# Patient Record
Sex: MALE | Race: WHITE | NOT HISPANIC OR LATINO | Employment: OTHER | URBAN - METROPOLITAN AREA
[De-identification: names, ages, dates, MRNs, and addresses within clinical notes are randomized per-mention and may not be internally consistent; named-entity substitution may affect disease eponyms.]

---

## 2017-01-10 ENCOUNTER — COMMUNICATION - HEALTHEAST (OUTPATIENT)
Dept: FAMILY MEDICINE | Facility: CLINIC | Age: 57
End: 2017-01-10

## 2017-01-30 ENCOUNTER — COMMUNICATION - HEALTHEAST (OUTPATIENT)
Dept: TELEHEALTH | Facility: CLINIC | Age: 57
End: 2017-01-30

## 2017-01-30 ENCOUNTER — OFFICE VISIT - HEALTHEAST (OUTPATIENT)
Dept: FAMILY MEDICINE | Facility: CLINIC | Age: 57
End: 2017-01-30

## 2017-01-30 DIAGNOSIS — I67.1 CEREBRAL ANEURYSM, NONRUPTURED: ICD-10-CM

## 2017-01-30 DIAGNOSIS — N52.9 ED (ERECTILE DYSFUNCTION): ICD-10-CM

## 2017-01-30 DIAGNOSIS — F32.9 MAJOR DEPRESSIVE DISORDER, SINGLE EPISODE, UNSPECIFIED: ICD-10-CM

## 2017-01-30 DIAGNOSIS — M25.532 WRIST PAIN, CHRONIC, LEFT: ICD-10-CM

## 2017-01-30 DIAGNOSIS — G89.29 WRIST PAIN, CHRONIC, LEFT: ICD-10-CM

## 2017-01-30 DIAGNOSIS — R52 PAIN: ICD-10-CM

## 2017-01-30 DIAGNOSIS — G81.90 HEMIPLEGIA (H): ICD-10-CM

## 2017-01-30 DIAGNOSIS — E78.5 HYPERLIPIDEMIA: ICD-10-CM

## 2017-01-30 LAB
CHOLEST SERPL-MCNC: 174 MG/DL
FASTING STATUS PATIENT QL REPORTED: YES
HDLC SERPL-MCNC: 41 MG/DL
LDLC SERPL CALC-MCNC: 105 MG/DL
TRIGL SERPL-MCNC: 139 MG/DL

## 2017-02-24 ENCOUNTER — COMMUNICATION - HEALTHEAST (OUTPATIENT)
Dept: FAMILY MEDICINE | Facility: CLINIC | Age: 57
End: 2017-02-24

## 2017-02-24 DIAGNOSIS — F32.9 MAJOR DEPRESSIVE DISORDER, SINGLE EPISODE, UNSPECIFIED: ICD-10-CM

## 2017-03-08 ENCOUNTER — COMMUNICATION - HEALTHEAST (OUTPATIENT)
Dept: FAMILY MEDICINE | Facility: CLINIC | Age: 57
End: 2017-03-08

## 2017-04-28 ENCOUNTER — COMMUNICATION - HEALTHEAST (OUTPATIENT)
Dept: FAMILY MEDICINE | Facility: CLINIC | Age: 57
End: 2017-04-28

## 2017-05-01 ENCOUNTER — COMMUNICATION - HEALTHEAST (OUTPATIENT)
Dept: SCHEDULING | Facility: CLINIC | Age: 57
End: 2017-05-01

## 2017-05-09 ENCOUNTER — COMMUNICATION - HEALTHEAST (OUTPATIENT)
Dept: FAMILY MEDICINE | Facility: CLINIC | Age: 57
End: 2017-05-09

## 2017-05-09 DIAGNOSIS — F32.9 MAJOR DEPRESSIVE DISORDER, SINGLE EPISODE, UNSPECIFIED: ICD-10-CM

## 2017-05-22 ENCOUNTER — RECORDS - HEALTHEAST (OUTPATIENT)
Dept: ADMINISTRATIVE | Facility: OTHER | Age: 57
End: 2017-05-22

## 2017-05-22 ENCOUNTER — OFFICE VISIT - HEALTHEAST (OUTPATIENT)
Dept: FAMILY MEDICINE | Facility: CLINIC | Age: 57
End: 2017-05-22

## 2017-05-22 DIAGNOSIS — F32.9 MAJOR DEPRESSIVE DISORDER, SINGLE EPISODE, UNSPECIFIED: ICD-10-CM

## 2017-05-22 DIAGNOSIS — M25.532 WRIST PAIN, CHRONIC, LEFT: ICD-10-CM

## 2017-05-22 DIAGNOSIS — G81.90 HEMIPLEGIA (H): ICD-10-CM

## 2017-05-22 DIAGNOSIS — R97.20 ELEVATED PSA: ICD-10-CM

## 2017-05-22 DIAGNOSIS — G89.29 WRIST PAIN, CHRONIC, LEFT: ICD-10-CM

## 2017-05-22 DIAGNOSIS — N40.0 BPH (BENIGN PROSTATIC HYPERPLASIA): ICD-10-CM

## 2017-05-22 LAB — PSA SERPL-MCNC: 4.4 NG/ML (ref 0–3.5)

## 2017-05-22 ASSESSMENT — MIFFLIN-ST. JEOR: SCORE: 1753.6

## 2017-07-05 ENCOUNTER — COMMUNICATION - HEALTHEAST (OUTPATIENT)
Dept: FAMILY MEDICINE | Facility: CLINIC | Age: 57
End: 2017-07-05

## 2017-07-13 ENCOUNTER — COMMUNICATION - HEALTHEAST (OUTPATIENT)
Dept: FAMILY MEDICINE | Facility: CLINIC | Age: 57
End: 2017-07-13

## 2017-07-13 DIAGNOSIS — G89.29 WRIST PAIN, CHRONIC, LEFT: ICD-10-CM

## 2017-07-13 DIAGNOSIS — N40.0 BPH (BENIGN PROSTATIC HYPERPLASIA): ICD-10-CM

## 2017-07-13 DIAGNOSIS — M25.532 WRIST PAIN, CHRONIC, LEFT: ICD-10-CM

## 2017-08-08 ENCOUNTER — COMMUNICATION - HEALTHEAST (OUTPATIENT)
Dept: FAMILY MEDICINE | Facility: CLINIC | Age: 57
End: 2017-08-08

## 2017-08-08 DIAGNOSIS — G89.29 WRIST PAIN, CHRONIC, LEFT: ICD-10-CM

## 2017-08-08 DIAGNOSIS — M79.642 HAND PAIN, LEFT: ICD-10-CM

## 2017-08-08 DIAGNOSIS — M25.532 WRIST PAIN, CHRONIC, LEFT: ICD-10-CM

## 2017-08-08 DIAGNOSIS — F32.9 MAJOR DEPRESSIVE DISORDER, SINGLE EPISODE, UNSPECIFIED: ICD-10-CM

## 2017-08-21 ENCOUNTER — RECORDS - HEALTHEAST (OUTPATIENT)
Dept: ADMINISTRATIVE | Facility: OTHER | Age: 57
End: 2017-08-21

## 2017-08-28 ENCOUNTER — COMMUNICATION - HEALTHEAST (OUTPATIENT)
Dept: FAMILY MEDICINE | Facility: CLINIC | Age: 57
End: 2017-08-28

## 2017-08-28 DIAGNOSIS — M25.579 ANKLE PAIN: ICD-10-CM

## 2017-09-01 ENCOUNTER — COMMUNICATION - HEALTHEAST (OUTPATIENT)
Dept: FAMILY MEDICINE | Facility: CLINIC | Age: 57
End: 2017-09-01

## 2017-09-05 ENCOUNTER — RECORDS - HEALTHEAST (OUTPATIENT)
Dept: ADMINISTRATIVE | Facility: OTHER | Age: 57
End: 2017-09-05

## 2017-09-28 ENCOUNTER — RECORDS - HEALTHEAST (OUTPATIENT)
Dept: ADMINISTRATIVE | Facility: OTHER | Age: 57
End: 2017-09-28

## 2017-10-24 ENCOUNTER — COMMUNICATION - HEALTHEAST (OUTPATIENT)
Dept: FAMILY MEDICINE | Facility: CLINIC | Age: 57
End: 2017-10-24

## 2017-10-24 DIAGNOSIS — M25.532 WRIST PAIN, CHRONIC, LEFT: ICD-10-CM

## 2017-10-24 DIAGNOSIS — G89.29 WRIST PAIN, CHRONIC, LEFT: ICD-10-CM

## 2017-10-24 DIAGNOSIS — N40.0 BPH (BENIGN PROSTATIC HYPERPLASIA): ICD-10-CM

## 2017-10-24 DIAGNOSIS — F32.9 MAJOR DEPRESSIVE DISORDER, SINGLE EPISODE: ICD-10-CM

## 2017-12-05 ENCOUNTER — COMMUNICATION - HEALTHEAST (OUTPATIENT)
Dept: FAMILY MEDICINE | Facility: CLINIC | Age: 57
End: 2017-12-05

## 2018-01-27 ENCOUNTER — COMMUNICATION - HEALTHEAST (OUTPATIENT)
Dept: FAMILY MEDICINE | Facility: CLINIC | Age: 58
End: 2018-01-27

## 2018-01-30 ENCOUNTER — COMMUNICATION - HEALTHEAST (OUTPATIENT)
Dept: FAMILY MEDICINE | Facility: CLINIC | Age: 58
End: 2018-01-30

## 2018-01-30 DIAGNOSIS — F32.9 MAJOR DEPRESSIVE DISORDER, SINGLE EPISODE: ICD-10-CM

## 2018-02-06 ENCOUNTER — COMMUNICATION - HEALTHEAST (OUTPATIENT)
Dept: FAMILY MEDICINE | Facility: CLINIC | Age: 58
End: 2018-02-06

## 2018-02-15 ENCOUNTER — OFFICE VISIT - HEALTHEAST (OUTPATIENT)
Dept: FAMILY MEDICINE | Facility: CLINIC | Age: 58
End: 2018-02-15

## 2018-02-15 ENCOUNTER — COMMUNICATION - HEALTHEAST (OUTPATIENT)
Dept: TELEHEALTH | Facility: CLINIC | Age: 58
End: 2018-02-15

## 2018-02-15 ENCOUNTER — RECORDS - HEALTHEAST (OUTPATIENT)
Dept: ADMINISTRATIVE | Facility: OTHER | Age: 58
End: 2018-02-15

## 2018-02-15 DIAGNOSIS — M25.532 WRIST PAIN, CHRONIC, LEFT: ICD-10-CM

## 2018-02-15 DIAGNOSIS — I67.1 CEREBRAL ANEURYSM, NONRUPTURED: ICD-10-CM

## 2018-02-15 DIAGNOSIS — F32.9 MAJOR DEPRESSIVE DISORDER, SINGLE EPISODE: ICD-10-CM

## 2018-02-15 DIAGNOSIS — G81.90 HEMIPLEGIA (H): ICD-10-CM

## 2018-02-15 DIAGNOSIS — G89.29 WRIST PAIN, CHRONIC, LEFT: ICD-10-CM

## 2018-02-15 LAB
ALBUMIN SERPL-MCNC: 3.7 G/DL (ref 3.5–5)
ALP SERPL-CCNC: 71 U/L (ref 45–120)
ALT SERPL W P-5'-P-CCNC: 17 U/L (ref 0–45)
ANION GAP SERPL CALCULATED.3IONS-SCNC: 8 MMOL/L (ref 5–18)
AST SERPL W P-5'-P-CCNC: 19 U/L (ref 0–40)
BILIRUB SERPL-MCNC: 0.2 MG/DL (ref 0–1)
BUN SERPL-MCNC: 22 MG/DL (ref 8–22)
CALCIUM SERPL-MCNC: 9.3 MG/DL (ref 8.5–10.5)
CHLORIDE BLD-SCNC: 104 MMOL/L (ref 98–107)
CHOLEST SERPL-MCNC: 177 MG/DL
CO2 SERPL-SCNC: 27 MMOL/L (ref 22–31)
CREAT SERPL-MCNC: 0.8 MG/DL (ref 0.7–1.3)
FASTING STATUS PATIENT QL REPORTED: YES
GFR SERPL CREATININE-BSD FRML MDRD: >60 ML/MIN/1.73M2
GLUCOSE BLD-MCNC: 111 MG/DL (ref 70–125)
HDLC SERPL-MCNC: 39 MG/DL
LDLC SERPL CALC-MCNC: 87 MG/DL
POTASSIUM BLD-SCNC: 4.5 MMOL/L (ref 3.5–5)
PROT SERPL-MCNC: 7.2 G/DL (ref 6–8)
SODIUM SERPL-SCNC: 139 MMOL/L (ref 136–145)
TRIGL SERPL-MCNC: 256 MG/DL
VALPROATE SERPL-MCNC: 47.5 UG/ML (ref 50–150)

## 2018-02-15 ASSESSMENT — MIFFLIN-ST. JEOR: SCORE: 1771.74

## 2018-02-16 ENCOUNTER — COMMUNICATION - HEALTHEAST (OUTPATIENT)
Dept: FAMILY MEDICINE | Facility: CLINIC | Age: 58
End: 2018-02-16

## 2018-02-16 DIAGNOSIS — N52.9 ED (ERECTILE DYSFUNCTION): ICD-10-CM

## 2018-02-19 ENCOUNTER — COMMUNICATION - HEALTHEAST (OUTPATIENT)
Dept: FAMILY MEDICINE | Facility: CLINIC | Age: 58
End: 2018-02-19

## 2018-03-29 ENCOUNTER — COMMUNICATION - HEALTHEAST (OUTPATIENT)
Dept: FAMILY MEDICINE | Facility: CLINIC | Age: 58
End: 2018-03-29

## 2018-04-19 ENCOUNTER — COMMUNICATION - HEALTHEAST (OUTPATIENT)
Dept: FAMILY MEDICINE | Facility: CLINIC | Age: 58
End: 2018-04-19

## 2018-04-19 DIAGNOSIS — F32.9 MAJOR DEPRESSIVE DISORDER, SINGLE EPISODE: ICD-10-CM

## 2018-05-07 ENCOUNTER — COMMUNICATION - HEALTHEAST (OUTPATIENT)
Dept: FAMILY MEDICINE | Facility: CLINIC | Age: 58
End: 2018-05-07

## 2018-05-21 ENCOUNTER — COMMUNICATION - HEALTHEAST (OUTPATIENT)
Dept: FAMILY MEDICINE | Facility: CLINIC | Age: 58
End: 2018-05-21

## 2018-05-21 DIAGNOSIS — G89.29 WRIST PAIN, CHRONIC, LEFT: ICD-10-CM

## 2018-05-21 DIAGNOSIS — M25.532 WRIST PAIN, CHRONIC, LEFT: ICD-10-CM

## 2018-06-07 ENCOUNTER — COMMUNICATION - HEALTHEAST (OUTPATIENT)
Dept: FAMILY MEDICINE | Facility: CLINIC | Age: 58
End: 2018-06-07

## 2018-06-07 DIAGNOSIS — I67.1 CEREBRAL ANEURYSM, NONRUPTURED: ICD-10-CM

## 2018-06-07 DIAGNOSIS — G81.90 HEMIPLEGIA (H): ICD-10-CM

## 2018-07-11 ENCOUNTER — COMMUNICATION - HEALTHEAST (OUTPATIENT)
Dept: FAMILY MEDICINE | Facility: CLINIC | Age: 58
End: 2018-07-11

## 2018-07-11 DIAGNOSIS — N52.9 ED (ERECTILE DYSFUNCTION): ICD-10-CM

## 2018-07-27 ENCOUNTER — COMMUNICATION - HEALTHEAST (OUTPATIENT)
Dept: FAMILY MEDICINE | Facility: CLINIC | Age: 58
End: 2018-07-27

## 2018-07-27 DIAGNOSIS — M25.532 WRIST PAIN, CHRONIC, LEFT: ICD-10-CM

## 2018-07-27 DIAGNOSIS — G89.29 WRIST PAIN, CHRONIC, LEFT: ICD-10-CM

## 2018-07-30 ENCOUNTER — RECORDS - HEALTHEAST (OUTPATIENT)
Dept: ADMINISTRATIVE | Facility: OTHER | Age: 58
End: 2018-07-30

## 2018-08-07 ENCOUNTER — COMMUNICATION - HEALTHEAST (OUTPATIENT)
Dept: SCHEDULING | Facility: CLINIC | Age: 58
End: 2018-08-07

## 2018-08-08 ENCOUNTER — COMMUNICATION - HEALTHEAST (OUTPATIENT)
Dept: FAMILY MEDICINE | Facility: CLINIC | Age: 58
End: 2018-08-08

## 2018-08-09 ENCOUNTER — RECORDS - HEALTHEAST (OUTPATIENT)
Dept: GENERAL RADIOLOGY | Facility: CLINIC | Age: 58
End: 2018-08-09

## 2018-08-09 ENCOUNTER — OFFICE VISIT - HEALTHEAST (OUTPATIENT)
Dept: FAMILY MEDICINE | Facility: CLINIC | Age: 58
End: 2018-08-09

## 2018-08-09 DIAGNOSIS — R10.12 LEFT UPPER QUADRANT PAIN: ICD-10-CM

## 2018-08-09 DIAGNOSIS — R10.12 LUQ ABDOMINAL PAIN: ICD-10-CM

## 2018-08-09 LAB
ALBUMIN SERPL-MCNC: 3.6 G/DL (ref 3.5–5)
ALP SERPL-CCNC: 59 U/L (ref 45–120)
ALT SERPL W P-5'-P-CCNC: 10 U/L (ref 0–45)
ANION GAP SERPL CALCULATED.3IONS-SCNC: 8 MMOL/L (ref 5–18)
AST SERPL W P-5'-P-CCNC: 16 U/L (ref 0–40)
BASOPHILS # BLD AUTO: 0.1 THOU/UL (ref 0–0.2)
BASOPHILS NFR BLD AUTO: 1 % (ref 0–2)
BILIRUB SERPL-MCNC: 0.4 MG/DL (ref 0–1)
BUN SERPL-MCNC: 20 MG/DL (ref 8–22)
CALCIUM SERPL-MCNC: 9.3 MG/DL (ref 8.5–10.5)
CHLORIDE BLD-SCNC: 106 MMOL/L (ref 98–107)
CO2 SERPL-SCNC: 27 MMOL/L (ref 22–31)
CREAT SERPL-MCNC: 0.75 MG/DL (ref 0.7–1.3)
EOSINOPHIL # BLD AUTO: 0.3 THOU/UL (ref 0–0.4)
EOSINOPHIL NFR BLD AUTO: 4 % (ref 0–6)
ERYTHROCYTE [DISTWIDTH] IN BLOOD BY AUTOMATED COUNT: 13 % (ref 11–14.5)
GFR SERPL CREATININE-BSD FRML MDRD: >60 ML/MIN/1.73M2
GLUCOSE BLD-MCNC: 78 MG/DL (ref 70–125)
HCT VFR BLD AUTO: 42.4 % (ref 40–54)
HGB BLD-MCNC: 14.5 G/DL (ref 14–18)
LIPASE SERPL-CCNC: 34 U/L (ref 0–52)
LYMPHOCYTES # BLD AUTO: 2.5 THOU/UL (ref 0.8–4.4)
LYMPHOCYTES NFR BLD AUTO: 31 % (ref 20–40)
MCH RBC QN AUTO: 31.2 PG (ref 27–34)
MCHC RBC AUTO-ENTMCNC: 34.2 G/DL (ref 32–36)
MCV RBC AUTO: 91 FL (ref 80–100)
MONOCYTES # BLD AUTO: 1.1 THOU/UL (ref 0–0.9)
MONOCYTES NFR BLD AUTO: 14 % (ref 2–10)
NEUTROPHILS # BLD AUTO: 4.1 THOU/UL (ref 2–7.7)
NEUTROPHILS NFR BLD AUTO: 52 % (ref 50–70)
PLATELET # BLD AUTO: 218 THOU/UL (ref 140–440)
PMV BLD AUTO: 8.2 FL (ref 7–10)
POTASSIUM BLD-SCNC: 4.4 MMOL/L (ref 3.5–5)
PROT SERPL-MCNC: 6.4 G/DL (ref 6–8)
RBC # BLD AUTO: 4.64 MILL/UL (ref 4.4–6.2)
SODIUM SERPL-SCNC: 141 MMOL/L (ref 136–145)
WBC: 8 THOU/UL (ref 4–11)

## 2018-08-09 ASSESSMENT — MIFFLIN-ST. JEOR: SCORE: 1697.81

## 2018-08-13 ENCOUNTER — COMMUNICATION - HEALTHEAST (OUTPATIENT)
Dept: FAMILY MEDICINE | Facility: CLINIC | Age: 58
End: 2018-08-13

## 2018-08-13 ENCOUNTER — RECORDS - HEALTHEAST (OUTPATIENT)
Dept: ADMINISTRATIVE | Facility: OTHER | Age: 58
End: 2018-08-13

## 2018-08-14 ENCOUNTER — COMMUNICATION - HEALTHEAST (OUTPATIENT)
Dept: FAMILY MEDICINE | Facility: CLINIC | Age: 58
End: 2018-08-14

## 2018-08-17 ENCOUNTER — COMMUNICATION - HEALTHEAST (OUTPATIENT)
Dept: FAMILY MEDICINE | Facility: CLINIC | Age: 58
End: 2018-08-17

## 2018-08-17 DIAGNOSIS — F32.9 MAJOR DEPRESSIVE DISORDER, SINGLE EPISODE: ICD-10-CM

## 2018-08-17 DIAGNOSIS — I67.1 CEREBRAL ANEURYSM, NONRUPTURED: ICD-10-CM

## 2018-08-17 DIAGNOSIS — G81.90 HEMIPLEGIA (H): ICD-10-CM

## 2018-08-21 ENCOUNTER — COMMUNICATION - HEALTHEAST (OUTPATIENT)
Dept: FAMILY MEDICINE | Facility: CLINIC | Age: 58
End: 2018-08-21

## 2018-08-21 DIAGNOSIS — F32.9 MAJOR DEPRESSIVE DISORDER, SINGLE EPISODE: ICD-10-CM

## 2018-09-24 ENCOUNTER — COMMUNICATION - HEALTHEAST (OUTPATIENT)
Dept: FAMILY MEDICINE | Facility: CLINIC | Age: 58
End: 2018-09-24

## 2018-09-24 DIAGNOSIS — F32.9 MAJOR DEPRESSIVE DISORDER, SINGLE EPISODE: ICD-10-CM

## 2018-09-27 ENCOUNTER — COMMUNICATION - HEALTHEAST (OUTPATIENT)
Dept: FAMILY MEDICINE | Facility: CLINIC | Age: 58
End: 2018-09-27

## 2018-09-27 DIAGNOSIS — M25.532 WRIST PAIN, CHRONIC, LEFT: ICD-10-CM

## 2018-09-27 DIAGNOSIS — G89.29 WRIST PAIN, CHRONIC, LEFT: ICD-10-CM

## 2018-10-01 ENCOUNTER — COMMUNICATION - HEALTHEAST (OUTPATIENT)
Dept: FAMILY MEDICINE | Facility: CLINIC | Age: 58
End: 2018-10-01

## 2018-10-01 ENCOUNTER — RECORDS - HEALTHEAST (OUTPATIENT)
Dept: ADMINISTRATIVE | Facility: OTHER | Age: 58
End: 2018-10-01

## 2018-10-09 ENCOUNTER — RECORDS - HEALTHEAST (OUTPATIENT)
Dept: ADMINISTRATIVE | Facility: OTHER | Age: 58
End: 2018-10-09

## 2018-10-16 ENCOUNTER — COMMUNICATION - HEALTHEAST (OUTPATIENT)
Dept: FAMILY MEDICINE | Facility: CLINIC | Age: 58
End: 2018-10-16

## 2018-10-17 ENCOUNTER — RECORDS - HEALTHEAST (OUTPATIENT)
Dept: ADMINISTRATIVE | Facility: OTHER | Age: 58
End: 2018-10-17

## 2018-10-23 ENCOUNTER — COMMUNICATION - HEALTHEAST (OUTPATIENT)
Dept: FAMILY MEDICINE | Facility: CLINIC | Age: 58
End: 2018-10-23

## 2018-10-23 DIAGNOSIS — F32.9 MAJOR DEPRESSIVE DISORDER, SINGLE EPISODE: ICD-10-CM

## 2018-11-14 ENCOUNTER — COMMUNICATION - HEALTHEAST (OUTPATIENT)
Dept: FAMILY MEDICINE | Facility: CLINIC | Age: 58
End: 2018-11-14

## 2018-11-14 DIAGNOSIS — M25.532 WRIST PAIN, CHRONIC, LEFT: ICD-10-CM

## 2018-11-14 DIAGNOSIS — G89.29 WRIST PAIN, CHRONIC, LEFT: ICD-10-CM

## 2018-11-22 ENCOUNTER — COMMUNICATION - HEALTHEAST (OUTPATIENT)
Dept: FAMILY MEDICINE | Facility: CLINIC | Age: 58
End: 2018-11-22

## 2018-11-22 DIAGNOSIS — F32.9 MAJOR DEPRESSIVE DISORDER, SINGLE EPISODE: ICD-10-CM

## 2018-12-19 ENCOUNTER — COMMUNICATION - HEALTHEAST (OUTPATIENT)
Dept: FAMILY MEDICINE | Facility: CLINIC | Age: 58
End: 2018-12-19

## 2018-12-19 ENCOUNTER — RECORDS - HEALTHEAST (OUTPATIENT)
Dept: ADMINISTRATIVE | Facility: OTHER | Age: 58
End: 2018-12-19

## 2018-12-19 DIAGNOSIS — N40.0 BPH (BENIGN PROSTATIC HYPERPLASIA): ICD-10-CM

## 2018-12-20 ENCOUNTER — COMMUNICATION - HEALTHEAST (OUTPATIENT)
Dept: FAMILY MEDICINE | Facility: CLINIC | Age: 58
End: 2018-12-20

## 2018-12-20 DIAGNOSIS — M25.532 WRIST PAIN, CHRONIC, LEFT: ICD-10-CM

## 2018-12-20 DIAGNOSIS — G89.29 WRIST PAIN, CHRONIC, LEFT: ICD-10-CM

## 2018-12-28 ENCOUNTER — COMMUNICATION - HEALTHEAST (OUTPATIENT)
Dept: FAMILY MEDICINE | Facility: CLINIC | Age: 58
End: 2018-12-28

## 2018-12-28 DIAGNOSIS — F32.9 MAJOR DEPRESSIVE DISORDER, SINGLE EPISODE: ICD-10-CM

## 2019-02-06 ENCOUNTER — COMMUNICATION - HEALTHEAST (OUTPATIENT)
Dept: FAMILY MEDICINE | Facility: CLINIC | Age: 59
End: 2019-02-06

## 2019-02-06 DIAGNOSIS — F32.9 MAJOR DEPRESSIVE DISORDER, SINGLE EPISODE: ICD-10-CM

## 2019-02-18 ENCOUNTER — OFFICE VISIT - HEALTHEAST (OUTPATIENT)
Dept: FAMILY MEDICINE | Facility: CLINIC | Age: 59
End: 2019-02-18

## 2019-02-18 DIAGNOSIS — R35.1 BENIGN PROSTATIC HYPERPLASIA WITH NOCTURIA: ICD-10-CM

## 2019-02-18 DIAGNOSIS — Z87.81 H/O COMPRESSION FRACTURE OF SPINE: ICD-10-CM

## 2019-02-18 DIAGNOSIS — M25.532 WRIST PAIN, CHRONIC, LEFT: ICD-10-CM

## 2019-02-18 DIAGNOSIS — N40.1 BENIGN PROSTATIC HYPERPLASIA WITH NOCTURIA: ICD-10-CM

## 2019-02-18 DIAGNOSIS — N40.0 BPH (BENIGN PROSTATIC HYPERPLASIA): ICD-10-CM

## 2019-02-18 DIAGNOSIS — G89.29 WRIST PAIN, CHRONIC, LEFT: ICD-10-CM

## 2019-02-18 DIAGNOSIS — G81.90 HEMIPLEGIA, UNSPECIFIED ETIOLOGY, UNSPECIFIED HEMIPLEGIA TYPE, UNSPECIFIED LATERALITY (H): ICD-10-CM

## 2019-02-18 DIAGNOSIS — F32.5 MAJOR DEPRESSIVE DISORDER WITH SINGLE EPISODE, IN FULL REMISSION (H): ICD-10-CM

## 2019-02-18 DIAGNOSIS — I67.1 CEREBRAL ANEURYSM, NONRUPTURED: ICD-10-CM

## 2019-02-18 LAB
ALBUMIN SERPL-MCNC: 3.4 G/DL (ref 3.5–5)
ALP SERPL-CCNC: 67 U/L (ref 45–120)
ALT SERPL W P-5'-P-CCNC: 12 U/L (ref 0–45)
ANION GAP SERPL CALCULATED.3IONS-SCNC: 9 MMOL/L (ref 5–18)
AST SERPL W P-5'-P-CCNC: 14 U/L (ref 0–40)
BILIRUB SERPL-MCNC: 0.2 MG/DL (ref 0–1)
BUN SERPL-MCNC: 26 MG/DL (ref 8–22)
CALCIUM SERPL-MCNC: 8.9 MG/DL (ref 8.5–10.5)
CHLORIDE BLD-SCNC: 107 MMOL/L (ref 98–107)
CHOLEST SERPL-MCNC: 151 MG/DL
CO2 SERPL-SCNC: 24 MMOL/L (ref 22–31)
CREAT SERPL-MCNC: 0.75 MG/DL (ref 0.7–1.3)
ERYTHROCYTE [DISTWIDTH] IN BLOOD BY AUTOMATED COUNT: 12.3 % (ref 11–14.5)
FASTING STATUS PATIENT QL REPORTED: YES
GFR SERPL CREATININE-BSD FRML MDRD: >60 ML/MIN/1.73M2
GLUCOSE BLD-MCNC: 86 MG/DL (ref 70–125)
HCT VFR BLD AUTO: 44 % (ref 40–54)
HDLC SERPL-MCNC: 44 MG/DL
HGB BLD-MCNC: 14.5 G/DL (ref 14–18)
LDLC SERPL CALC-MCNC: 70 MG/DL
MCH RBC QN AUTO: 30.1 PG (ref 27–34)
MCHC RBC AUTO-ENTMCNC: 32.9 G/DL (ref 32–36)
MCV RBC AUTO: 92 FL (ref 80–100)
PLATELET # BLD AUTO: 236 THOU/UL (ref 140–440)
PMV BLD AUTO: 8.7 FL (ref 7–10)
POTASSIUM BLD-SCNC: 4.3 MMOL/L (ref 3.5–5)
PROT SERPL-MCNC: 6.5 G/DL (ref 6–8)
PSA SERPL-MCNC: 3.1 NG/ML (ref 0–3.5)
RBC # BLD AUTO: 4.8 MILL/UL (ref 4.4–6.2)
SODIUM SERPL-SCNC: 140 MMOL/L (ref 136–145)
TRIGL SERPL-MCNC: 183 MG/DL
VALPROATE SERPL-MCNC: 66.6 UG/ML (ref 50–150)
WBC: 7.5 THOU/UL (ref 4–11)

## 2019-02-20 ENCOUNTER — COMMUNICATION - HEALTHEAST (OUTPATIENT)
Dept: FAMILY MEDICINE | Facility: CLINIC | Age: 59
End: 2019-02-20

## 2019-03-12 ENCOUNTER — COMMUNICATION - HEALTHEAST (OUTPATIENT)
Dept: FAMILY MEDICINE | Facility: CLINIC | Age: 59
End: 2019-03-12

## 2019-03-12 DIAGNOSIS — F32.9 MAJOR DEPRESSIVE DISORDER, SINGLE EPISODE: ICD-10-CM

## 2019-03-28 ENCOUNTER — COMMUNICATION - HEALTHEAST (OUTPATIENT)
Dept: FAMILY MEDICINE | Facility: CLINIC | Age: 59
End: 2019-03-28

## 2019-03-28 DIAGNOSIS — M25.532 WRIST PAIN, CHRONIC, LEFT: ICD-10-CM

## 2019-03-28 DIAGNOSIS — F32.9 MAJOR DEPRESSIVE DISORDER, SINGLE EPISODE: ICD-10-CM

## 2019-03-28 DIAGNOSIS — G89.29 WRIST PAIN, CHRONIC, LEFT: ICD-10-CM

## 2019-04-07 ENCOUNTER — COMMUNICATION - HEALTHEAST (OUTPATIENT)
Dept: FAMILY MEDICINE | Facility: CLINIC | Age: 59
End: 2019-04-07

## 2019-04-07 DIAGNOSIS — M25.532 WRIST PAIN, CHRONIC, LEFT: ICD-10-CM

## 2019-04-07 DIAGNOSIS — G89.29 WRIST PAIN, CHRONIC, LEFT: ICD-10-CM

## 2019-04-10 ENCOUNTER — COMMUNICATION - HEALTHEAST (OUTPATIENT)
Dept: FAMILY MEDICINE | Facility: CLINIC | Age: 59
End: 2019-04-10

## 2019-04-10 DIAGNOSIS — F32.9 MAJOR DEPRESSIVE DISORDER, SINGLE EPISODE: ICD-10-CM

## 2019-05-08 ENCOUNTER — RECORDS - HEALTHEAST (OUTPATIENT)
Dept: ADMINISTRATIVE | Facility: OTHER | Age: 59
End: 2019-05-08

## 2019-05-13 ENCOUNTER — COMMUNICATION - HEALTHEAST (OUTPATIENT)
Dept: FAMILY MEDICINE | Facility: CLINIC | Age: 59
End: 2019-05-13

## 2019-05-13 DIAGNOSIS — F32.9 MAJOR DEPRESSIVE DISORDER, SINGLE EPISODE: ICD-10-CM

## 2019-05-13 DIAGNOSIS — M25.532 WRIST PAIN, CHRONIC, LEFT: ICD-10-CM

## 2019-05-13 DIAGNOSIS — G89.29 WRIST PAIN, CHRONIC, LEFT: ICD-10-CM

## 2019-05-17 ENCOUNTER — COMMUNICATION - HEALTHEAST (OUTPATIENT)
Dept: FAMILY MEDICINE | Facility: CLINIC | Age: 59
End: 2019-05-17

## 2019-05-17 DIAGNOSIS — N52.9 ERECTILE DYSFUNCTION, UNSPECIFIED ERECTILE DYSFUNCTION TYPE: ICD-10-CM

## 2019-06-10 ENCOUNTER — COMMUNICATION - HEALTHEAST (OUTPATIENT)
Dept: FAMILY MEDICINE | Facility: CLINIC | Age: 59
End: 2019-06-10

## 2019-06-10 DIAGNOSIS — F32.9 MAJOR DEPRESSIVE DISORDER, SINGLE EPISODE: ICD-10-CM

## 2019-06-10 DIAGNOSIS — M25.532 WRIST PAIN, CHRONIC, LEFT: ICD-10-CM

## 2019-06-10 DIAGNOSIS — G89.29 WRIST PAIN, CHRONIC, LEFT: ICD-10-CM

## 2019-07-06 ENCOUNTER — COMMUNICATION - HEALTHEAST (OUTPATIENT)
Dept: FAMILY MEDICINE | Facility: CLINIC | Age: 59
End: 2019-07-06

## 2019-07-06 DIAGNOSIS — G89.29 WRIST PAIN, CHRONIC, LEFT: ICD-10-CM

## 2019-07-06 DIAGNOSIS — M25.532 WRIST PAIN, CHRONIC, LEFT: ICD-10-CM

## 2019-07-31 ENCOUNTER — OFFICE VISIT - HEALTHEAST (OUTPATIENT)
Dept: FAMILY MEDICINE | Facility: CLINIC | Age: 59
End: 2019-07-31

## 2019-07-31 DIAGNOSIS — G89.29 WRIST PAIN, CHRONIC, LEFT: ICD-10-CM

## 2019-07-31 DIAGNOSIS — I67.1 CEREBRAL ANEURYSM, NONRUPTURED: ICD-10-CM

## 2019-07-31 DIAGNOSIS — F32.9 MAJOR DEPRESSIVE DISORDER WITH SINGLE EPISODE, REMISSION STATUS UNSPECIFIED: ICD-10-CM

## 2019-07-31 DIAGNOSIS — M25.532 WRIST PAIN, CHRONIC, LEFT: ICD-10-CM

## 2019-07-31 DIAGNOSIS — F32.9 MAJOR DEPRESSIVE DISORDER, SINGLE EPISODE: ICD-10-CM

## 2019-07-31 DIAGNOSIS — E78.5 HYPERLIPIDEMIA, UNSPECIFIED HYPERLIPIDEMIA TYPE: ICD-10-CM

## 2019-07-31 DIAGNOSIS — G81.90 HEMIPLEGIA, UNSPECIFIED ETIOLOGY, UNSPECIFIED HEMIPLEGIA TYPE, UNSPECIFIED LATERALITY (H): ICD-10-CM

## 2019-07-31 LAB — VALPROATE SERPL-MCNC: 111 UG/ML (ref 50–150)

## 2019-08-02 ENCOUNTER — COMMUNICATION - HEALTHEAST (OUTPATIENT)
Dept: FAMILY MEDICINE | Facility: CLINIC | Age: 59
End: 2019-08-02

## 2019-09-06 ENCOUNTER — COMMUNICATION - HEALTHEAST (OUTPATIENT)
Dept: FAMILY MEDICINE | Facility: CLINIC | Age: 59
End: 2019-09-06

## 2019-09-06 DIAGNOSIS — G89.29 WRIST PAIN, CHRONIC, LEFT: ICD-10-CM

## 2019-09-06 DIAGNOSIS — M25.532 WRIST PAIN, CHRONIC, LEFT: ICD-10-CM

## 2019-09-18 ENCOUNTER — COMMUNICATION - HEALTHEAST (OUTPATIENT)
Dept: FAMILY MEDICINE | Facility: CLINIC | Age: 59
End: 2019-09-18

## 2019-09-18 DIAGNOSIS — I67.1 CEREBRAL ANEURYSM, NONRUPTURED: ICD-10-CM

## 2019-09-18 DIAGNOSIS — G81.90 HEMIPLEGIA (H): ICD-10-CM

## 2019-09-20 ENCOUNTER — COMMUNICATION - HEALTHEAST (OUTPATIENT)
Dept: FAMILY MEDICINE | Facility: CLINIC | Age: 59
End: 2019-09-20

## 2019-09-20 DIAGNOSIS — F32.9 MAJOR DEPRESSIVE DISORDER, SINGLE EPISODE: ICD-10-CM

## 2019-10-01 ENCOUNTER — COMMUNICATION - HEALTHEAST (OUTPATIENT)
Dept: FAMILY MEDICINE | Facility: CLINIC | Age: 59
End: 2019-10-01

## 2019-10-01 DIAGNOSIS — G89.29 WRIST PAIN, CHRONIC, LEFT: ICD-10-CM

## 2019-10-01 DIAGNOSIS — M25.532 WRIST PAIN, CHRONIC, LEFT: ICD-10-CM

## 2019-10-25 ENCOUNTER — COMMUNICATION - HEALTHEAST (OUTPATIENT)
Dept: FAMILY MEDICINE | Facility: CLINIC | Age: 59
End: 2019-10-25

## 2019-10-25 DIAGNOSIS — M25.532 WRIST PAIN, CHRONIC, LEFT: ICD-10-CM

## 2019-10-25 DIAGNOSIS — G89.29 WRIST PAIN, CHRONIC, LEFT: ICD-10-CM

## 2019-10-26 ENCOUNTER — RECORDS - HEALTHEAST (OUTPATIENT)
Dept: ADMINISTRATIVE | Facility: OTHER | Age: 59
End: 2019-10-26

## 2019-10-27 ENCOUNTER — RECORDS - HEALTHEAST (OUTPATIENT)
Dept: ADMINISTRATIVE | Facility: OTHER | Age: 59
End: 2019-10-27

## 2019-11-01 ENCOUNTER — RECORDS - HEALTHEAST (OUTPATIENT)
Dept: ADMINISTRATIVE | Facility: OTHER | Age: 59
End: 2019-11-01

## 2019-11-04 ENCOUNTER — COMMUNICATION - HEALTHEAST (OUTPATIENT)
Dept: FAMILY MEDICINE | Facility: CLINIC | Age: 59
End: 2019-11-04

## 2019-11-05 ENCOUNTER — RECORDS - HEALTHEAST (OUTPATIENT)
Dept: ADMINISTRATIVE | Facility: OTHER | Age: 59
End: 2019-11-05

## 2019-11-06 ENCOUNTER — COMMUNICATION - HEALTHEAST (OUTPATIENT)
Dept: FAMILY MEDICINE | Facility: CLINIC | Age: 59
End: 2019-11-06

## 2019-11-08 ENCOUNTER — COMMUNICATION - HEALTHEAST (OUTPATIENT)
Dept: FAMILY MEDICINE | Facility: CLINIC | Age: 59
End: 2019-11-08

## 2019-11-13 ENCOUNTER — COMMUNICATION - HEALTHEAST (OUTPATIENT)
Dept: FAMILY MEDICINE | Facility: CLINIC | Age: 59
End: 2019-11-13

## 2019-11-13 DIAGNOSIS — M25.532 WRIST PAIN, CHRONIC, LEFT: ICD-10-CM

## 2019-11-13 DIAGNOSIS — G89.29 WRIST PAIN, CHRONIC, LEFT: ICD-10-CM

## 2019-11-15 ENCOUNTER — RECORDS - HEALTHEAST (OUTPATIENT)
Dept: ADMINISTRATIVE | Facility: OTHER | Age: 59
End: 2019-11-15

## 2019-11-18 ENCOUNTER — OFFICE VISIT - HEALTHEAST (OUTPATIENT)
Dept: FAMILY MEDICINE | Facility: CLINIC | Age: 59
End: 2019-11-18

## 2019-11-18 DIAGNOSIS — Z00.00 MEDICARE ANNUAL WELLNESS VISIT, SUBSEQUENT: ICD-10-CM

## 2019-11-18 DIAGNOSIS — E78.5 HYPERLIPIDEMIA, UNSPECIFIED HYPERLIPIDEMIA TYPE: ICD-10-CM

## 2019-11-18 DIAGNOSIS — N40.0 BENIGN PROSTATIC HYPERPLASIA WITHOUT LOWER URINARY TRACT SYMPTOMS: ICD-10-CM

## 2019-11-18 DIAGNOSIS — I67.1 CEREBRAL ANEURYSM, NONRUPTURED: ICD-10-CM

## 2019-11-18 DIAGNOSIS — F32.9 MAJOR DEPRESSIVE DISORDER WITH SINGLE EPISODE, REMISSION STATUS UNSPECIFIED: ICD-10-CM

## 2019-11-18 DIAGNOSIS — S22.41XS CLOSED FRACTURE OF MULTIPLE RIBS OF RIGHT SIDE, SEQUELA: ICD-10-CM

## 2019-11-18 DIAGNOSIS — R35.1 NOCTURIA: ICD-10-CM

## 2019-11-18 DIAGNOSIS — G81.90 HEMIPLEGIA, UNSPECIFIED ETIOLOGY, UNSPECIFIED HEMIPLEGIA TYPE, UNSPECIFIED LATERALITY (H): ICD-10-CM

## 2019-11-18 DIAGNOSIS — J93.9 PNEUMOTHORAX, UNSPECIFIED TYPE: ICD-10-CM

## 2019-11-18 LAB
ALBUMIN SERPL-MCNC: 3.5 G/DL (ref 3.5–5)
ALP SERPL-CCNC: 121 U/L (ref 45–120)
ALT SERPL W P-5'-P-CCNC: 10 U/L (ref 0–45)
ANION GAP SERPL CALCULATED.3IONS-SCNC: 9 MMOL/L (ref 5–18)
AST SERPL W P-5'-P-CCNC: 11 U/L (ref 0–40)
BILIRUB SERPL-MCNC: 0.4 MG/DL (ref 0–1)
BUN SERPL-MCNC: 19 MG/DL (ref 8–22)
CALCIUM SERPL-MCNC: 9.3 MG/DL (ref 8.5–10.5)
CHLORIDE BLD-SCNC: 107 MMOL/L (ref 98–107)
CHOLEST SERPL-MCNC: 125 MG/DL
CO2 SERPL-SCNC: 26 MMOL/L (ref 22–31)
CREAT SERPL-MCNC: 0.78 MG/DL (ref 0.7–1.3)
ERYTHROCYTE [DISTWIDTH] IN BLOOD BY AUTOMATED COUNT: 12.1 % (ref 11–14.5)
FASTING STATUS PATIENT QL REPORTED: YES
GFR SERPL CREATININE-BSD FRML MDRD: >60 ML/MIN/1.73M2
GLUCOSE BLD-MCNC: 82 MG/DL (ref 70–125)
HCT VFR BLD AUTO: 41.2 % (ref 40–54)
HDLC SERPL-MCNC: 34 MG/DL
HGB BLD-MCNC: 14 G/DL (ref 14–18)
LDLC SERPL CALC-MCNC: 58 MG/DL
MCH RBC QN AUTO: 30.9 PG (ref 27–34)
MCHC RBC AUTO-ENTMCNC: 33.9 G/DL (ref 32–36)
MCV RBC AUTO: 91 FL (ref 80–100)
PLATELET # BLD AUTO: 285 THOU/UL (ref 140–440)
PMV BLD AUTO: 8.5 FL (ref 7–10)
POTASSIUM BLD-SCNC: 4.6 MMOL/L (ref 3.5–5)
PROT SERPL-MCNC: 6.6 G/DL (ref 6–8)
PSA SERPL-MCNC: 4 NG/ML (ref 0–4.5)
RBC # BLD AUTO: 4.52 MILL/UL (ref 4.4–6.2)
SODIUM SERPL-SCNC: 142 MMOL/L (ref 136–145)
TRIGL SERPL-MCNC: 163 MG/DL
VALPROATE SERPL-MCNC: 81.9 UG/ML (ref 50–150)
WBC: 6.3 THOU/UL (ref 4–11)

## 2019-11-18 ASSESSMENT — MIFFLIN-ST. JEOR: SCORE: 1709.37

## 2019-11-22 ENCOUNTER — COMMUNICATION - HEALTHEAST (OUTPATIENT)
Dept: FAMILY MEDICINE | Facility: CLINIC | Age: 59
End: 2019-11-22

## 2019-12-16 ENCOUNTER — COMMUNICATION - HEALTHEAST (OUTPATIENT)
Dept: FAMILY MEDICINE | Facility: CLINIC | Age: 59
End: 2019-12-16

## 2019-12-16 ENCOUNTER — RECORDS - HEALTHEAST (OUTPATIENT)
Dept: ADMINISTRATIVE | Facility: OTHER | Age: 59
End: 2019-12-16

## 2019-12-16 DIAGNOSIS — M25.532 WRIST PAIN, CHRONIC, LEFT: ICD-10-CM

## 2019-12-16 DIAGNOSIS — G89.29 WRIST PAIN, CHRONIC, LEFT: ICD-10-CM

## 2019-12-16 DIAGNOSIS — F32.9 MAJOR DEPRESSIVE DISORDER, SINGLE EPISODE: ICD-10-CM

## 2019-12-18 ENCOUNTER — COMMUNICATION - HEALTHEAST (OUTPATIENT)
Dept: SCHEDULING | Facility: CLINIC | Age: 59
End: 2019-12-18

## 2019-12-18 DIAGNOSIS — F32.9 MAJOR DEPRESSIVE DISORDER, SINGLE EPISODE: ICD-10-CM

## 2020-01-15 ENCOUNTER — COMMUNICATION - HEALTHEAST (OUTPATIENT)
Dept: FAMILY MEDICINE | Facility: CLINIC | Age: 60
End: 2020-01-15

## 2020-01-15 DIAGNOSIS — F32.9 MAJOR DEPRESSIVE DISORDER, SINGLE EPISODE: ICD-10-CM

## 2020-01-19 ENCOUNTER — COMMUNICATION - HEALTHEAST (OUTPATIENT)
Dept: FAMILY MEDICINE | Facility: CLINIC | Age: 60
End: 2020-01-19

## 2020-01-19 DIAGNOSIS — F32.9 MAJOR DEPRESSIVE DISORDER, SINGLE EPISODE: ICD-10-CM

## 2020-02-10 ENCOUNTER — COMMUNICATION - HEALTHEAST (OUTPATIENT)
Dept: FAMILY MEDICINE | Facility: CLINIC | Age: 60
End: 2020-02-10

## 2020-02-10 DIAGNOSIS — M25.532 WRIST PAIN, CHRONIC, LEFT: ICD-10-CM

## 2020-02-10 DIAGNOSIS — G89.29 WRIST PAIN, CHRONIC, LEFT: ICD-10-CM

## 2020-03-03 ENCOUNTER — COMMUNICATION - HEALTHEAST (OUTPATIENT)
Dept: FAMILY MEDICINE | Facility: CLINIC | Age: 60
End: 2020-03-03

## 2020-03-03 DIAGNOSIS — G89.29 WRIST PAIN, CHRONIC, LEFT: ICD-10-CM

## 2020-03-03 DIAGNOSIS — M25.532 WRIST PAIN, CHRONIC, LEFT: ICD-10-CM

## 2020-03-10 ENCOUNTER — COMMUNICATION - HEALTHEAST (OUTPATIENT)
Dept: FAMILY MEDICINE | Facility: CLINIC | Age: 60
End: 2020-03-10

## 2020-03-10 DIAGNOSIS — I67.1 CEREBRAL ANEURYSM, NONRUPTURED: ICD-10-CM

## 2020-03-10 DIAGNOSIS — G81.90 HEMIPLEGIA (H): ICD-10-CM

## 2020-04-08 ENCOUNTER — COMMUNICATION - HEALTHEAST (OUTPATIENT)
Dept: FAMILY MEDICINE | Facility: CLINIC | Age: 60
End: 2020-04-08

## 2020-04-08 DIAGNOSIS — M25.532 WRIST PAIN, CHRONIC, LEFT: ICD-10-CM

## 2020-04-08 DIAGNOSIS — G89.29 WRIST PAIN, CHRONIC, LEFT: ICD-10-CM

## 2020-04-19 ENCOUNTER — COMMUNICATION - HEALTHEAST (OUTPATIENT)
Dept: FAMILY MEDICINE | Facility: CLINIC | Age: 60
End: 2020-04-19

## 2020-04-19 DIAGNOSIS — F32.9 MAJOR DEPRESSIVE DISORDER, SINGLE EPISODE: ICD-10-CM

## 2020-04-23 ENCOUNTER — OFFICE VISIT - HEALTHEAST (OUTPATIENT)
Dept: FAMILY MEDICINE | Facility: CLINIC | Age: 60
End: 2020-04-23

## 2020-04-23 DIAGNOSIS — M25.532 WRIST PAIN, CHRONIC, LEFT: ICD-10-CM

## 2020-04-23 DIAGNOSIS — G81.90 HEMIPLEGIA, UNSPECIFIED ETIOLOGY, UNSPECIFIED HEMIPLEGIA TYPE, UNSPECIFIED LATERALITY (H): ICD-10-CM

## 2020-04-23 DIAGNOSIS — Z00.00 HEALTH CARE MAINTENANCE: ICD-10-CM

## 2020-04-23 DIAGNOSIS — G89.29 WRIST PAIN, CHRONIC, LEFT: ICD-10-CM

## 2020-04-23 DIAGNOSIS — F32.9 MAJOR DEPRESSIVE DISORDER WITH SINGLE EPISODE, REMISSION STATUS UNSPECIFIED: ICD-10-CM

## 2020-04-23 DIAGNOSIS — N40.0 BENIGN PROSTATIC HYPERPLASIA WITHOUT LOWER URINARY TRACT SYMPTOMS: ICD-10-CM

## 2020-04-23 DIAGNOSIS — I67.1 CEREBRAL ANEURYSM, NONRUPTURED: ICD-10-CM

## 2020-04-23 ASSESSMENT — PATIENT HEALTH QUESTIONNAIRE - PHQ9: SUM OF ALL RESPONSES TO PHQ QUESTIONS 1-9: 0

## 2020-05-12 ENCOUNTER — COMMUNICATION - HEALTHEAST (OUTPATIENT)
Dept: FAMILY MEDICINE | Facility: CLINIC | Age: 60
End: 2020-05-12

## 2020-05-12 DIAGNOSIS — G89.29 WRIST PAIN, CHRONIC, LEFT: ICD-10-CM

## 2020-05-12 DIAGNOSIS — M25.532 WRIST PAIN, CHRONIC, LEFT: ICD-10-CM

## 2020-06-19 ENCOUNTER — COMMUNICATION - HEALTHEAST (OUTPATIENT)
Dept: FAMILY MEDICINE | Facility: CLINIC | Age: 60
End: 2020-06-19

## 2020-06-19 DIAGNOSIS — M25.532 WRIST PAIN, CHRONIC, LEFT: ICD-10-CM

## 2020-06-19 DIAGNOSIS — G89.29 WRIST PAIN, CHRONIC, LEFT: ICD-10-CM

## 2020-06-19 DIAGNOSIS — N52.9 ERECTILE DYSFUNCTION, UNSPECIFIED ERECTILE DYSFUNCTION TYPE: ICD-10-CM

## 2020-06-19 DIAGNOSIS — F32.9 MAJOR DEPRESSIVE DISORDER, SINGLE EPISODE: ICD-10-CM

## 2020-07-12 ENCOUNTER — COMMUNICATION - HEALTHEAST (OUTPATIENT)
Dept: FAMILY MEDICINE | Facility: CLINIC | Age: 60
End: 2020-07-12

## 2020-07-12 DIAGNOSIS — F32.9 MAJOR DEPRESSIVE DISORDER, SINGLE EPISODE: ICD-10-CM

## 2020-07-17 ENCOUNTER — COMMUNICATION - HEALTHEAST (OUTPATIENT)
Dept: FAMILY MEDICINE | Facility: CLINIC | Age: 60
End: 2020-07-17

## 2020-07-17 DIAGNOSIS — F32.9 MAJOR DEPRESSIVE DISORDER, SINGLE EPISODE: ICD-10-CM

## 2020-07-17 DIAGNOSIS — M25.532 WRIST PAIN, CHRONIC, LEFT: ICD-10-CM

## 2020-07-17 DIAGNOSIS — G89.29 WRIST PAIN, CHRONIC, LEFT: ICD-10-CM

## 2020-08-11 ENCOUNTER — COMMUNICATION - HEALTHEAST (OUTPATIENT)
Dept: FAMILY MEDICINE | Facility: CLINIC | Age: 60
End: 2020-08-11

## 2020-08-11 DIAGNOSIS — M25.532 WRIST PAIN, CHRONIC, LEFT: ICD-10-CM

## 2020-08-11 DIAGNOSIS — G89.29 WRIST PAIN, CHRONIC, LEFT: ICD-10-CM

## 2020-08-26 ENCOUNTER — RECORDS - HEALTHEAST (OUTPATIENT)
Dept: ADMINISTRATIVE | Facility: OTHER | Age: 60
End: 2020-08-26

## 2020-09-10 ENCOUNTER — COMMUNICATION - HEALTHEAST (OUTPATIENT)
Dept: FAMILY MEDICINE | Facility: CLINIC | Age: 60
End: 2020-09-10

## 2020-09-10 ENCOUNTER — TRANSFERRED RECORDS (OUTPATIENT)
Dept: HEALTH INFORMATION MANAGEMENT | Facility: CLINIC | Age: 60
End: 2020-09-10

## 2020-09-10 ENCOUNTER — RECORDS - HEALTHEAST (OUTPATIENT)
Dept: ADMINISTRATIVE | Facility: OTHER | Age: 60
End: 2020-09-10

## 2020-09-10 DIAGNOSIS — Z11.59 ENCOUNTER FOR SCREENING FOR OTHER VIRAL DISEASES: Primary | ICD-10-CM

## 2020-09-11 ENCOUNTER — COMMUNICATION - HEALTHEAST (OUTPATIENT)
Dept: FAMILY MEDICINE | Facility: CLINIC | Age: 60
End: 2020-09-11

## 2020-09-11 DIAGNOSIS — F32.9 MAJOR DEPRESSIVE DISORDER, SINGLE EPISODE: ICD-10-CM

## 2020-09-15 ENCOUNTER — OFFICE VISIT - HEALTHEAST (OUTPATIENT)
Dept: FAMILY MEDICINE | Facility: CLINIC | Age: 60
End: 2020-09-15

## 2020-09-15 ENCOUNTER — COMMUNICATION - HEALTHEAST (OUTPATIENT)
Dept: FAMILY MEDICINE | Facility: CLINIC | Age: 60
End: 2020-09-15

## 2020-09-15 ENCOUNTER — MEDICAL CORRESPONDENCE (OUTPATIENT)
Dept: HEALTH INFORMATION MANAGEMENT | Facility: CLINIC | Age: 60
End: 2020-09-15

## 2020-09-15 DIAGNOSIS — M25.532 WRIST PAIN, CHRONIC, LEFT: ICD-10-CM

## 2020-09-15 DIAGNOSIS — G81.90 HEMIPLEGIA, UNSPECIFIED ETIOLOGY, UNSPECIFIED HEMIPLEGIA TYPE, UNSPECIFIED LATERALITY (H): ICD-10-CM

## 2020-09-15 DIAGNOSIS — I67.1 CEREBRAL ANEURYSM, NONRUPTURED: ICD-10-CM

## 2020-09-15 DIAGNOSIS — G89.29 WRIST PAIN, CHRONIC, LEFT: ICD-10-CM

## 2020-09-16 ENCOUNTER — COMMUNICATION - HEALTHEAST (OUTPATIENT)
Dept: NURSING | Facility: CLINIC | Age: 60
End: 2020-09-16

## 2020-09-16 ENCOUNTER — TELEPHONE (OUTPATIENT)
Dept: PHYSICAL MEDICINE AND REHAB | Facility: CLINIC | Age: 60
End: 2020-09-16

## 2020-09-16 ENCOUNTER — TRANSCRIBE ORDERS (OUTPATIENT)
Dept: OTHER | Age: 60
End: 2020-09-16

## 2020-09-16 DIAGNOSIS — G81.90 HEMIPLEGIA (H): Primary | ICD-10-CM

## 2020-09-16 NOTE — TELEPHONE ENCOUNTER
[facility] Call Center    Phone Message    May a detailed message be left on voicemail: no     Reason for Call: Appointment Intake    Referring Provider Name: Dr. Valdez from [facility]  Diagnosis and/or Symptoms: Patient is being referred back to PM&R clinic. Patient was last seen in 2012 and is  requesting botox/evaal for his right wrist/arm due to his osteoarthritis. He needs to make sure his right wrist/arm is functional prior to his upcoming surgery on his left wrist/arm with Dr. Cheng at [facility].      Please call Michelle to schedule.    Action Taken: Other: [facility] PMR    Travel Screening: Not Applicable

## 2020-09-18 NOTE — TELEPHONE ENCOUNTER
[facility] Call Center    Phone Message    May a detailed message be left on voicemail: yes     Reason for Call: Other: Michelle calling checking on status of getting patient scheduled.       Please advise.     Action Taken: Other: [facility] PM&R    Travel Screening: Not Applicable

## 2020-09-28 ENCOUNTER — TELEPHONE (OUTPATIENT)
Dept: PHYSICAL MEDICINE AND REHAB | Facility: CLINIC | Age: 60
End: 2020-09-28

## 2020-09-28 NOTE — TELEPHONE ENCOUNTER
Spoke with the patient to discuss scheduling an appointment with Dr. Fuentes or Dr. Steen. He stated he would have his ex wife call the clinic back when she returned home to assist in scheduling an appointment.

## 2020-09-28 NOTE — TELEPHONE ENCOUNTER
[facility] Call Center    Phone Message    May a detailed message be left on voicemail: no     Reason for Call: Appointment Intake    Referring Provider Name: Marlo Valdez  Diagnosis and/or Symptoms: eval for botox, right arm/wrist hemiplegia    Please call Nino to schedule. Michelle calling to request a call to confirm the appointment for Nino due to him having surgery on 10/28/20.     Action Taken: Message routed to:  [facility] ([facility]): [facility] PHYS MED & REHAB    Travel Screening: Not Applicable

## 2020-10-01 ENCOUNTER — COMMUNICATION - HEALTHEAST (OUTPATIENT)
Dept: NURSING | Facility: CLINIC | Age: 60
End: 2020-10-01

## 2020-10-05 NOTE — TELEPHONE ENCOUNTER
[facility] Call Center    Phone Message    May a detailed message be left on voicemail: yes     Reason for Call: Other: 3RD REQUEST since 9/16/20  TIME SENSITIVE  Michelle from [facility], requesting a call back asap to schedule this pt for botox prior to his surgery on 10/28/20 with [facility]. See Referral from Dr. Marlo Valdez. See encounters on 9/16 and 9/18.  Thank you.    Call Michelle,  at the [facility], [phone]. Thank you.    Action Taken: Message routed to:  [facility] ([facility]): [facility]    Travel Screening: Not Applicable

## 2020-10-06 ENCOUNTER — COMMUNICATION - HEALTHEAST (OUTPATIENT)
Dept: CARE COORDINATION | Facility: CLINIC | Age: 60
End: 2020-10-06

## 2020-10-07 ENCOUNTER — COMMUNICATION - HEALTHEAST (OUTPATIENT)
Dept: FAMILY MEDICINE | Facility: CLINIC | Age: 60
End: 2020-10-07

## 2020-10-07 DIAGNOSIS — G81.10 SPASTIC HEMIPARESIS (H): Primary | ICD-10-CM

## 2020-10-13 ENCOUNTER — COMMUNICATION - HEALTHEAST (OUTPATIENT)
Dept: CARE COORDINATION | Facility: CLINIC | Age: 60
End: 2020-10-13

## 2020-10-14 ENCOUNTER — COMMUNICATION - HEALTHEAST (OUTPATIENT)
Dept: NURSING | Facility: CLINIC | Age: 60
End: 2020-10-14

## 2020-10-16 ENCOUNTER — COMMUNICATION - HEALTHEAST (OUTPATIENT)
Dept: CARE COORDINATION | Facility: CLINIC | Age: 60
End: 2020-10-16

## 2020-10-20 ENCOUNTER — OFFICE VISIT - HEALTHEAST (OUTPATIENT)
Dept: FAMILY MEDICINE | Facility: CLINIC | Age: 60
End: 2020-10-20

## 2020-10-20 ENCOUNTER — TRANSFERRED RECORDS (OUTPATIENT)
Dept: HEALTH INFORMATION MANAGEMENT | Facility: CLINIC | Age: 60
End: 2020-10-20

## 2020-10-20 ENCOUNTER — RECORDS - HEALTHEAST (OUTPATIENT)
Dept: ADMINISTRATIVE | Facility: OTHER | Age: 60
End: 2020-10-20

## 2020-10-20 DIAGNOSIS — F32.9 MAJOR DEPRESSIVE DISORDER WITH SINGLE EPISODE, REMISSION STATUS UNSPECIFIED: ICD-10-CM

## 2020-10-20 DIAGNOSIS — M25.532 WRIST PAIN, CHRONIC, LEFT: ICD-10-CM

## 2020-10-20 DIAGNOSIS — G89.29 WRIST PAIN, CHRONIC, LEFT: ICD-10-CM

## 2020-10-20 DIAGNOSIS — G81.91 HEMIPLEGIA AFFECTING RIGHT DOMINANT SIDE, UNSPECIFIED ETIOLOGY, UNSPECIFIED HEMIPLEGIA TYPE (H): ICD-10-CM

## 2020-10-20 DIAGNOSIS — E78.5 HYPERLIPIDEMIA, UNSPECIFIED HYPERLIPIDEMIA TYPE: ICD-10-CM

## 2020-10-20 DIAGNOSIS — Z01.818 PREOP GENERAL PHYSICAL EXAM: ICD-10-CM

## 2020-10-20 DIAGNOSIS — I67.1 CEREBRAL ANEURYSM, NONRUPTURED: ICD-10-CM

## 2020-10-22 ENCOUNTER — HOSPITAL ENCOUNTER (OUTPATIENT)
Dept: CARDIOLOGY | Facility: CLINIC | Age: 60
Discharge: HOME | End: 2020-10-22
Attending: FAMILY MEDICINE

## 2020-10-22 ENCOUNTER — COMMUNICATION - HEALTHEAST (OUTPATIENT)
Dept: NURSING | Facility: CLINIC | Age: 60
End: 2020-10-22

## 2020-10-22 ENCOUNTER — AMBULATORY - HEALTHEAST (OUTPATIENT)
Dept: LAB | Facility: CLINIC | Age: 60
End: 2020-10-22

## 2020-10-22 ENCOUNTER — RECORDS - HEALTHEAST (OUTPATIENT)
Dept: GENERAL RADIOLOGY | Facility: CLINIC | Age: 60
End: 2020-10-22

## 2020-10-22 ENCOUNTER — RECORDS - HEALTHEAST (OUTPATIENT)
Dept: ADMINISTRATIVE | Facility: OTHER | Age: 60
End: 2020-10-22

## 2020-10-22 ENCOUNTER — OFFICE VISIT (OUTPATIENT)
Dept: PHYSICAL MEDICINE AND REHAB | Facility: CLINIC | Age: 60
End: 2020-10-22
Payer: MEDICARE

## 2020-10-22 VITALS — TEMPERATURE: 97.9 F

## 2020-10-22 DIAGNOSIS — I67.1 CEREBRAL ANEURYSM, NONRUPTURED: ICD-10-CM

## 2020-10-22 DIAGNOSIS — Z01.818 ENCOUNTER FOR OTHER PREPROCEDURAL EXAMINATION: ICD-10-CM

## 2020-10-22 DIAGNOSIS — Z01.818 PREOP GENERAL PHYSICAL EXAM: ICD-10-CM

## 2020-10-22 DIAGNOSIS — G81.10 SPASTIC HEMIPARESIS (H): Primary | ICD-10-CM

## 2020-10-22 DIAGNOSIS — I61.9 HEMORRHAGIC STROKE (H): ICD-10-CM

## 2020-10-22 LAB
ALBUMIN SERPL-MCNC: 3.7 G/DL (ref 3.5–5)
ALP SERPL-CCNC: 76 U/L (ref 45–120)
ALT SERPL W P-5'-P-CCNC: 10 U/L (ref 0–45)
ANION GAP SERPL CALCULATED.3IONS-SCNC: 10 MMOL/L (ref 5–18)
AST SERPL W P-5'-P-CCNC: 15 U/L (ref 0–40)
ATRIAL RATE - MUSE: 77 BPM
BILIRUB SERPL-MCNC: 0.3 MG/DL (ref 0–1)
BUN SERPL-MCNC: 19 MG/DL (ref 8–22)
CALCIUM SERPL-MCNC: 9.2 MG/DL (ref 8.5–10.5)
CHLORIDE BLD-SCNC: 108 MMOL/L (ref 98–107)
CO2 SERPL-SCNC: 25 MMOL/L (ref 22–31)
CREAT SERPL-MCNC: 0.75 MG/DL (ref 0.7–1.3)
DIASTOLIC BLOOD PRESSURE - MUSE: NORMAL
ERYTHROCYTE [DISTWIDTH] IN BLOOD BY AUTOMATED COUNT: 12.2 % (ref 11–14.5)
GFR SERPL CREATININE-BSD FRML MDRD: >60 ML/MIN/1.73M2
GLUCOSE BLD-MCNC: 93 MG/DL (ref 70–125)
HCT VFR BLD AUTO: 45.6 % (ref 40–54)
HGB BLD-MCNC: 14.9 G/DL (ref 14–18)
INTERPRETATION ECG - MUSE: NORMAL
MCH RBC QN AUTO: 30.7 PG (ref 27–34)
MCHC RBC AUTO-ENTMCNC: 32.7 G/DL (ref 32–36)
MCV RBC AUTO: 94 FL (ref 80–100)
P AXIS - MUSE: 60 DEGREES
PLATELET # BLD AUTO: 262 THOU/UL (ref 140–440)
PMV BLD AUTO: 8.5 FL (ref 7–10)
POTASSIUM BLD-SCNC: 4.5 MMOL/L (ref 3.5–5)
PR INTERVAL - MUSE: 140 MS
PROT SERPL-MCNC: 7 G/DL (ref 6–8)
QRS DURATION - MUSE: 82 MS
QT - MUSE: 356 MS
QTC - MUSE: 402 MS
R AXIS - MUSE: 46 DEGREES
RBC # BLD AUTO: 4.86 MILL/UL (ref 4.4–6.2)
SODIUM SERPL-SCNC: 143 MMOL/L (ref 136–145)
SYSTOLIC BLOOD PRESSURE - MUSE: NORMAL
T AXIS - MUSE: 54 DEGREES
VALPROATE SERPL-MCNC: 60.2 UG/ML (ref 50–150)
VENTRICULAR RATE- MUSE: 77 BPM
WBC: 7.9 THOU/UL (ref 4–11)

## 2020-10-22 PROCEDURE — 99204 OFFICE O/P NEW MOD 45 MIN: CPT | Mod: 25 | Performed by: PHYSICAL MEDICINE & REHABILITATION

## 2020-10-22 PROCEDURE — 95874 GUIDE NERV DESTR NEEDLE EMG: CPT | Performed by: PHYSICAL MEDICINE & REHABILITATION

## 2020-10-22 PROCEDURE — 64643 CHEMODENERV 1 EXTREM 1-4 EA: CPT | Performed by: PHYSICAL MEDICINE & REHABILITATION

## 2020-10-22 PROCEDURE — 64642 CHEMODENERV 1 EXTREMITY 1-4: CPT | Performed by: PHYSICAL MEDICINE & REHABILITATION

## 2020-10-22 RX ORDER — LIDOCAINE 50 MG/G
1-3 PATCH TOPICAL
COMMUNITY
Start: 2019-10-31

## 2020-10-22 NOTE — PATIENT INSTRUCTIONS
You received botox injections to the muscles your elbow flexors, wrist flexors, finger flexors, ankle plantarflexors, and inverters of your foot.  The purpose of these injections are to make using the walker easier and to give you better control of your ankle when you walk.    To assess the effectiveness of the botulinum toxin injections, we would like you to pay attention to any improvements in these areas:  - Pain  - Spasms  - Skin issues in the palm  - Any functional gains - daily activities that become easier to do  - Comfort  - Range of motion of your joints    On our follow up visit, we will determine which if any improvements are seen to guide the next series of injections.  We will also assess your response to the therapies received in the transitional care unit.

## 2020-10-22 NOTE — NURSING NOTE
Chief Complaint   Patient presents with     Botox     [facility] NEW - BOTOX, right hand     Meir Rader

## 2020-10-22 NOTE — PROGRESS NOTES
BOTULINUM TOXIN PROCEDURE NOTE    Chief Complaint   Patient presents with     Botox     [facility] - BOTOX, right hand       Temp 97.9  F (36.6  C)       Current Outpatient Medications:      baclofen (LIORESAL) 10 MG tablet, Take 0.5 tablets by mouth 3 times daily., Disp: , Rfl:      buPROPion (BUDEPRION XL) 150 MG 24 hr tablet, Take 1 tablet by mouth 3 times daily., Disp: , Rfl:      divalproex (DEPAKOTE) 500 MG EC tablet, Take 2 tablets by mouth 2 times daily., Disp: , Rfl:      lidocaine (LIDODERM) 5 % patch, Place 1-3 patches onto the skin, Disp: , Rfl:      methylphenidate (METHYLIN) 10 MG tablet, Take 1 tablet by mouth 2 times daily., Disp: , Rfl:      venlafaxine (EFFEXOR) 37.5 MG tablet, Take 1 tablet by mouth 2 times daily., Disp: , Rfl:      VIAGRA 100 MG OR TABS, 1 TABLET DAILY AS NEEDED, Disp: 8, Rfl: 5     zolpidem (AMBIEN) 10 MG tablet, Take 1 tablet by mouth nightly as needed., Disp: , Rfl:      buPROPion (WELLBUTRIN XL) 300 MG 24 hr tablet, Take 1 tablet by mouth daily., Disp: , Rfl:      OnabotulinumtoxinA (BOTOX IJ), Inject 550 Units into the muscle. Lot #  [id number] Exp: 5/2014. , Disp: , Rfl:      OnabotulinumtoxinA, Cosmetic, (BOTOX COSMETIC IM), Inject 595 Units into the muscle., Disp: , Rfl:     Current Facility-Administered Medications:      botulinum toxin type A (BOTOX) 100 units injection 400 Units, 400 Units, Intramuscular, Q90 Days, Becca Fuentes MD     No Known Allergies       PHYSICAL EXAM:  ANKLE AND FOOT PATTERN:  Right ankle in KAFO.  Gait pattern with minor circumduction of right leg, genu recurvatum during right stance phase, and persistent inversion and lateral rotation of right foot.  WRIST AND HAND PATTERN:  2/4 MAS right elbow flexors, 3/4 MAS finger flexors, thumb flexors, and opponens.  4/4 wrist flexors with wrist flexion contracture at 10 degrees below neutral.        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  His neurologist is Dr. Butts and he is on baclofen and methylphenidate, which have been helpful.  He is referred to us for botulinum toxin injections for right arm and hand deformity. He was followed by Dr. Daniel and last seen for Botox injections in 3/2012.    His last hospital admission was 10/27/2019 to [facility] due to a fall resulting in multiple rib fractures complicated by pneumothorax.    He saw his primary care provider in September 2020 who continues to monitor his long term oxycodone.    He had a recent steroid injection for left wrist osteoarthritis with Dr. Cheng at [facility], which did not help.  He is scheduled for a fusion and scaphoid excision of his left wrist on 10/28/20, after which he will go to a transitional care unit.    He currently denies any pain or discomfort to the right arm or leg.  He endorses spasms in his right wrist and forearm that he attributes to his methylphenidate.  He had issues extending the wrist for several years.  He does not use the right arm for functional tasks aside from carrying grocery bags on his arm.  He uses his left arm and hand for support during ambulation and transfers and all ADLs.  He denies overt skin breakdown in his right hand, but he has noted some redness where his fingernails grasp his palm.    We reviewed the recommended safety guidelines for  Botox from any vaccine injection, such as the seasonal flu vaccine, by a minimum of 10-14 days with Nino Stephen. He acknowledged understanding.      RESPONSE TO PREVIOUS TREATMENT:  Nino Stephen received 550 units of Botox on 3/8/2012, 8 years ago.  He denies any positive or negative response to these injections.    Problems following the previous series of neurotoxin injections included:  No problems reported      BENEFITS BY PATIENT REPORT:  Pain  Improvement: Not applicable    Spasticity Improvement: No      BOTULINUM NEUROTOXIN INJECTION PROCEDURES:    VERIFICATION OF PATIENT IDENTIFICATION AND PROCEDURE     Initials   Patient Name DYB   Patient  DYB   Procedure Verified by: MILLY     Prior to the start of the procedure and with procedural staff participation, I verbally confirmed the patient s identity using two indicators, relevant allergies, that the procedure was appropriate and matched the consent or emergent situation, and that the correct equipment/implants were available. Immediately prior to starting the procedure I conducted the Time Out with the procedural staff and re-confirmed the patient s name, procedure, and site/side. (The Joint Commission universal protocol was followed.)  Yes    Sedation (Moderate or Deep): None      Above assessments performed by:  Resident/Fellow         Allan Park          The attending provider was present for the entire procedure documented below.      Becca Fuentes MD      INDICATION/S FOR PROCEDURE/S:  Nino Stephen is a 60 year old patient with spasticity affecting the  right upper extremity and deformity secondary to a diagnosis of spastic hemiparesis with associated  pain, loss of joint motion and loss of volitional motor control.    The goal of his botulinum toxin injection is to improve the work of ambulation during rehab for his left wrist surgery and to prevent injury to the skin of his right palm. Expected occupational functional gains in his right hand will be very limited due to chronic nonuse.    His baseline symptoms have been recalcitrant to oral medications and conservative therapy.  He is here today for an injection of Botox.      GOAL OF PROCEDURE:  The goal of this procedure is to increase active range of motion and decrease pain  associated with spasticity.    TOTAL DOSE ADMINISTERED:  Dose Administered:  300 units Botox, 1:1 dilution  Diluent Used:  Preservative Free Normal Saline  Total  Volume of Diluent Used:  3 ml  Lot # [id number] with Expiration Date:  06/2023  NDC #: Botox 100u ([address]-25)    Medication guide was offered to patient and was declined.    CONSENT:  The risks, benefits, and treatment options were discussed with Nino Stephen and he agreed to proceed.      Written consent was obtained by MILLY.     EQUIPMENT USED:  Needle-37mm stimulating/recording  Needle-30 gauge  EMG/NCS Machine  Ultrasound machine    SKIN PREPARATION:  Skin preparation was performed using an alcohol wipe.    GUIDANCE DESCRIPTION:  Electro-myographic guidance was necessary throughout the procedure to accurately identify all areas of spastic muscles while avoiding injection of non-spastic muscles, neighboring nerves and nearby vascular structures. and Ultrasound guidance was necessary for injections into the posterior tibialis to accurately identify all areas of spastic muscles while avoiding injection of non-spastic muscles, neighboring nerves and nearby vascular structures.     AREA/MUSCLE INJECTED: 300U total  UPPER ARM MUSCLES:  Right Biceps Brachii - 30 units of Botox at 2 site/s.   FOREARM & HAND MUSCLES:  Right Pronator Teres - 20 units of Botox at 1 site/s.   Right Flexor Digitorum Profundus - 20 units of Botox at 1 site/s.   Right Flexor Digitorum Superficials - 20 units of Botox at 1 site/s.   Right Flexor Carpi Radialis - 50 units of Botox at 1 site/s.   Right Flexor Carpi Ulnaris - 50 units of Botox at 1 site/s.   LEG & FOOT MUSCLES:   Right Gastrocnemius - 60 units of Botox at 2 site/s.   Right Tibialis Posterior - 50 units of Botox at 1 site/s.    RESPONSE TO PROCEDURE:  Nino Stephen tolerated the procedure well and there were no immediate complications.  He was allowed to recover for an appropriate period of time and was discharged home in stable condition.    FOLLOW UP:  Nino Stephen was asked to follow up by phone in 7-14 days with Theodora Ayers RN, Care Coordinator, to report  his response to this series of injections.  Based on the patient's previous response to this therapy, Nino Stephen was rescheduled for the next series of injections in 12 weeks.    PLAN (Medication Changes, Therapy Orders, Work or Disability Issues, etc.):  - Assess response to botulinum toxin and discuss changes to therapies and bracing after TCU stay    Allan Park MD  PM&R resident     Patient was seen and discussed with Dr. Fuentes. She was present during the entire procedure.       Physician Attestation   I, Becca Fuentes MD, saw this patient with the resident and agree with the resident/fellow's findings and plan of care as documented in the note.      I personally reviewed vital signs, medications and imaging.    Key findings:   Nino has been overall stable since 2012, when he was last seen in PM&R clinic by Dr. Daniel. We had a long conversation today regarding his functional deficits, spasticity management, and his previous response to botulinum toxin injections. Very appropriate for Botox injections given his symptoms associated with increased tone. He and his wife will monitor his response very closely. Will see him after his upcoming procedure. He may need a new brace and more therapies to optimize his functional gains along with these injections. We will adjust the dose and sites of injection pending his response and course.     Becca Fuentes MD  Date of Service (when I saw the patient): 10/22/20

## 2020-10-22 NOTE — PROGRESS NOTES
PM&R Clinic Note     Patient Name: Nino Stephen : 1960 Medical Record: [id number]     Requesting Physician/clinician: No att. providers found           History of Present Illness:     Nino Stephen is a 60 year old male ***    Aneurysm rupture back in  resulting in right spastic hemiparesis   Patient admitted to [facility] on 10/27/2019 due to a fall resulting in pneumothorax and multiple rib fractures.   He sees Dr. Butts from neurology       Symptoms,        Therapies/HEP,        Functionally,                Past Medical and Surgical History:     No past medical history on file.  No past surgical history on file.         Social History:     Social History     Tobacco Use     Smoking status: Not on file   Substance Use Topics     Alcohol use: Not on file       Marital Status: ***  Living situation: ***  Family support: ***  Vocational History: ***  Tobacco use: ***  Alcohol use: ***  Recreational drug use: ***         Functional history:     Nino Stephen is independent with all aspects of *** life.    ADLs: ***  Assistive devices: ***  iADLs (medication management and finances): ***  Hand dominance: ***  Driving: ***           Family History:     No family history on file.         Medications:     Current Outpatient Medications   Medication Sig Dispense Refill     baclofen (LIORESAL) 10 MG tablet Take 0.5 tablets by mouth 3 times daily.       buPROPion (BUDEPRION XL) 150 MG 24 hr tablet Take 1 tablet by mouth 3 times daily.       buPROPion (WELLBUTRIN XL) 300 MG 24 hr tablet Take 1 tablet by mouth daily.       divalproex (DEPAKOTE) 500 MG EC tablet Take 2 tablets by mouth 2 times daily.       methylphenidate (METHYLIN) 10 MG tablet Take 1 tablet by mouth 2 times daily.       naproxen (NAPROSYN) 500 MG tablet Take 1 tablet by mouth 2 times daily.       OnabotulinumtoxinA (BOTOX IJ) Inject 550 Units into the muscle. Lot #  [id number] Exp: 2014.         OnabotulinumtoxinA, Cosmetic, (BOTOX COSMETIC IM) Inject 595 Units into the muscle.       venlafaxine (EFFEXOR) 37.5 MG tablet Take 1 tablet by mouth 2 times daily.       VIAGRA 100 MG OR TABS 1 TABLET DAILY AS NEEDED 8 5     zolpidem (AMBIEN) 10 MG tablet Take 1 tablet by mouth nightly as needed.              Allergies:     No Known Allergies           ROS:     A focused ROS is negative other than the symptoms noted above in the HPI.      Constitutional: denies any fevers, chills, any recent weight loss ***  Eyes: denies changes in visual acuity ***  Ears, Nose, Throat: denies any difficulty swallowing ***  Cardiovascular: denies any exertional chest pain or palpitation ***  Respiratory: denies dyspnea ***  Gastrointestinal: denies any nausea, vomiting, abdominal pain, diarrhea or constipation ***  Genitourinary: denies any dysuria, hematuria, frequency or urgency ***  Musculoskeletal: denies any muscle pain, joint pain, neck pain or back pain ***  Neurologic: denies any headache, changes in motor or sensory function, no loss of balance or vertigo ***  Psychiatric: denies mood changes; sleeps OK ***             Physical Examiniation:     VITAL SIGNS: There were no vitals taken for this visit.  BMI: Estimated body mass index is 26.45 kg/m  as calculated from the following:    Height as of 3/8/12: 1.829 m (6').    Weight as of 3/8/12: 88.5 kg (195 lb).    Gen: NAD, pleasant and cooperative   HEENT: {:[id number]}, {RUSH PE MARGARITA ENT W/ LISTS [id number]}, {:[id number]}  Cardio: regular pulse  Pulm: non-labored breathing in room air  Abd: benign  Ext: WWP, no edema in BLE, no tenderness in calves  Neuro/MSK:              Laboratory/Imaging:     ***           Assessment/Plan:     ***    1. Patient education: In depth discussion and education was provided about the assessment and implications of each of the below recommendations for management. Patient indicated readiness to learn, all questions were answered and understanding  of material presented was confirmed.  2. Work-up:  3. Therapy/equipment/braces:  4. Medications:  5. Interventions:  6. Referral / follow up with other providers:  7. Follow up:    Becca Fuentes MD  Physical Medicine & Rehabilitation    I spent a total of *** minutes face-to-face with Nino Stephen during today's office visit. Over 50% of this time was spent counseling the patient and/or coordinating care. See note for details.

## 2020-10-22 NOTE — LETTER
10/22/2020       RE: Nino Stephen  [address] [place] [address] [address]  [facility] [address]     Dear Colleague,    Thank you for referring your patient, Nino Stephen, to the [facility] PHYS[facility]INE AND [facility]HABILITATION CLINIC [place] at [facility]. Please see a copy of my visit note below.    BOTULINUM TOXIN PROCEDURE NOTE    Chief Complaint   Patient presents with     Botox     [facility] NEW - BOTOX, right hand       Temp 97.9  F (36.6  C)       Current Outpatient Medications:      baclofen (LIORESAL) 10 MG tablet, Take 0.5 tablets by mouth 3 times daily., Disp: , Rfl:      buPROPion (BUDEPRION XL) 150 MG 24 hr tablet, Take 1 tablet by mouth 3 times daily., Disp: , Rfl:      divalproex (DEPAKOTE) 500 MG EC tablet, Take 2 tablets by mouth 2 times daily., Disp: , Rfl:      lidocaine (LIDODERM) 5 % patch, Place 1-3 patches onto the skin, Disp: , Rfl:      methylphenidate (METHYLIN) 10 MG tablet, Take 1 tablet by mouth 2 times daily., Disp: , Rfl:      venlafaxine (EFFEXOR) 37.5 MG tablet, Take 1 tablet by mouth 2 times daily., Disp: , Rfl:      VIAGRA 100 MG OR TABS, 1 TABLET DAILY AS NEEDED, Disp: 8, Rfl: 5     zolpidem (AMBIEN) 10 MG tablet, Take 1 tablet by mouth nightly as needed., Disp: , Rfl:      buPROPion (WELLBUTRIN XL) 300 MG 24 hr tablet, Take 1 tablet by mouth daily., Disp: , Rfl:      OnabotulinumtoxinA (BOTOX IJ), Inject 550 Units into the muscle. Lot #  [id number] Exp: 5/2014. , Disp: , Rfl:      OnabotulinumtoxinA, Cosmetic, (BOTOX COSMETIC IM), Inject 595 Units into the muscle., Disp: , Rfl:     Current Facility-Administered Medications:      botulinum toxin type A (BOTOX) 100 units injection 400 Units, 400 Units, Intramuscular, Q90 Days, Becca Fuentes MD     No Known Allergies       PHYSICAL EXAM:  ANKLE AND FOOT PATTERN:  Right ankle in KAFO.  Gait pattern with minor circumduction of right leg, genu recurvatum during right stance phase, and persistent  inversion and lateral rotation of right foot.  WRIST AND HAND PATTERN:  2/4 MAS right elbow flexors, 3/4 MAS finger flexors, thumb flexors, and opponens.  4/4 wrist flexors with wrist flexion contracture at 10 degrees below neutral.        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  His neurologist is Dr. Butts and he is on baclofen and methylphenidate, which have been helpful.  He is referred to us for botulinum toxin injections for right arm and hand deformity. He was followed by Dr. Daniel and last seen for Botox injections in 3/2012.    His last hospital admission was 10/27/2019 to [facility] due to a fall resulting in multiple rib fractures complicated by pneumothorax.    He saw his primary care provider in September 2020 who continues to monitor his long term oxycodone.    He had a recent steroid injection for left wrist osteoarthritis with Dr. Cheng at [facility], which did not help.  He is scheduled for a fusion and scaphoid excision of his left wrist on 10/28/20, after which he will go to a transitional care unit.    He currently denies any pain or discomfort to the right arm or leg.  He endorses spasms in his right wrist and forearm that he attributes to his methylphenidate.  He had issues extending the wrist for several years.  He does not use the right arm for functional tasks aside from carrying grocery bags on his arm.  He uses his left arm and hand for support during ambulation and transfers and all ADLs.  He denies overt skin breakdown in his right hand, but he has noted some redness where his fingernails grasp his palm.    We reviewed the recommended safety guidelines for  Botox from any vaccine injection, such as the seasonal flu vaccine, by a minimum of 10-14 days with Nino Stephen. He  acknowledged understanding.      RESPONSE TO PREVIOUS TREATMENT:  Nino Stephen received 550 units of Botox on 3/8/2012, 8 years ago.  He denies any positive or negative response to these injections.    Problems following the previous series of neurotoxin injections included:  No problems reported      BENEFITS BY PATIENT REPORT:  Pain Improvement: Not applicable    Spasticity Improvement: No      BOTULINUM NEUROTOXIN INJECTION PROCEDURES:    VERIFICATION OF PATIENT IDENTIFICATION AND PROCEDURE     Initials   Patient Name DYB   Patient  DYB   Procedure Verified by: MILLY     Prior to the start of the procedure and with procedural staff participation, I verbally confirmed the patient s identity using two indicators, relevant allergies, that the procedure was appropriate and matched the consent or emergent situation, and that the correct equipment/implants were available. Immediately prior to starting the procedure I conducted the Time Out with the procedural staff and re-confirmed the patient s name, procedure, and site/side. (The Joint Commission universal protocol was followed.)  Yes    Sedation (Moderate or Deep): None      Above assessments performed by:  Resident/Fellow         Allan Park          The attending provider was present for the entire procedure documented below.      Becca Fuentes MD      INDICATION/S FOR PROCEDURE/S:  Nino Stephen is a 60 year old patient with spasticity affecting the  right upper extremity and deformity secondary to a diagnosis of spastic hemiparesis with associated  pain, loss of joint motion and loss of volitional motor control.    The goal of his botulinum toxin injection is to improve the work of ambulation during rehab for his left wrist surgery and to prevent injury to the skin of his right palm. Expected occupational functional gains in his right hand will be very limited due to chronic nonuse.    His baseline symptoms have been recalcitrant to oral medications  and conservative therapy.  He is here today for an injection of Botox.      GOAL OF PROCEDURE:  The goal of this procedure is to increase active range of motion and decrease pain  associated with spasticity.    TOTAL DOSE ADMINISTERED:  Dose Administered:  300 units Botox, 1:1 dilution  Diluent Used:  Preservative Free Normal Saline  Total Volume of Diluent Used:  3 ml  Lot # [id number] with Expiration Date:  06/2023  NDC #: Botox 100u ([address]-99)    Medication guide was offered to patient and was declined.    CONSENT:  The risks, benefits, and treatment options were discussed with Nino Stephen and he agreed to proceed.      Written consent was obtained by MILLY.     EQUIPMENT USED:  Needle-37mm stimulating/recording  Needle-30 gauge  EMG/NCS Machine  Ultrasound machine    SKIN PREPARATION:  Skin preparation was performed using an alcohol wipe.    GUIDANCE DESCRIPTION:  Electro-myographic guidance was necessary throughout the procedure to accurately identify all areas of spastic muscles while avoiding injection of non-spastic muscles, neighboring nerves and nearby vascular structures. and Ultrasound guidance was necessary for injections into the posterior tibialis to accurately identify all areas of spastic muscles while avoiding injection of non-spastic muscles, neighboring nerves and nearby vascular structures.     AREA/MUSCLE INJECTED: 300U total  UPPER ARM MUSCLES:  Right Biceps Brachii - 30 units of Botox at 2 site/s.   FOREARM & HAND MUSCLES:  Right Pronator Teres - 20 units of Botox at 1 site/s.   Right Flexor Digitorum Profundus - 20 units of Botox at 1 site/s.   Right Flexor Digitorum Superficials - 20 units of Botox at 1 site/s.   Right Flexor Carpi Radialis - 50 units of Botox at 1 site/s.   Right Flexor Carpi Ulnaris - 50 units of Botox at 1 site/s.   LEG & FOOT MUSCLES:   Right Gastrocnemius - 60 units of Botox at 2 site/s.   Right Tibialis Posterior - 50 units of Botox at 1 site/s.    RESPONSE TO  PROCEDURE:  Nino Stephen tolerated the procedure well and there were no immediate complications.  He was allowed to recover for an appropriate period of time and was discharged home in stable condition.    FOLLOW UP:  Nino Stephen was asked to follow up by phone in 7-14 days with Theodora Ayers RN, Care Coordinator, to report his response to this series of injections.  Based on the patient's previous response to this therapy, Nino Stephen was rescheduled for the next series of injections in 12 weeks.    PLAN (Medication Changes, Therapy Orders, Work or Disability Issues, etc.):  - Assess response to botulinum toxin and discuss changes to therapies and bracing after TCU stay    Allan Park MD  PM&R resident     Patient was seen and discussed with Dr. Fuentes. She was present during the entire procedure.       Physician Attestation   I, Becca Fuentes MD, saw this patient with the resident and agree with the resident/fellow's findings and plan of care as documented in the note.      I personally reviewed vital signs, medications and imaging.    Key findings:   Nino has been overall stable since 2012, when he was last seen in PM&R clinic by Dr. Daniel. We had a long conversation today regarding his functional deficits, spasticity management, and his previous response to botulinum toxin injections. Very appropriate for Botox injections given his symptoms associated with increased tone. He and his wife will monitor his response very closely. Will see him after his upcoming procedure. He may need a new brace and more therapies to optimize his functional gains along with these injections. We will adjust the dose and sites of injection pending his response and course.     Becca Fuentes MD  Date of Service (when I saw the patient): 10/22/20

## 2020-10-24 DIAGNOSIS — Z11.59 ENCOUNTER FOR SCREENING FOR OTHER VIRAL DISEASES: ICD-10-CM

## 2020-10-24 PROCEDURE — U0003 INFECTIOUS AGENT DETECTION BY NUCLEIC ACID (DNA OR RNA); SEVERE ACUTE RESPIRATORY SYNDROME CORONAVIRUS 2 (SARS-COV-2) (CORONAVIRUS DISEASE [COVID-19]), AMPLIFIED PROBE TECHNIQUE, MAKING USE OF HIGH THROUGHPUT TECHNOLOGIES AS DESCRIBED BY CMS-2020-01-R: HCPCS | Performed by: ORTHOPAEDIC SURGERY

## 2020-10-25 LAB
SARS-COV-2 RNA SPEC QL NAA+PROBE: NOT DETECTED
SPECIMEN SOURCE: NORMAL

## 2020-10-26 ENCOUNTER — COMMUNICATION - HEALTHEAST (OUTPATIENT)
Dept: CARE COORDINATION | Facility: CLINIC | Age: 60
End: 2020-10-26

## 2020-10-26 ENCOUNTER — COMMUNICATION - HEALTHEAST (OUTPATIENT)
Dept: FAMILY MEDICINE | Facility: CLINIC | Age: 60
End: 2020-10-26

## 2020-10-26 DIAGNOSIS — F32.9 MAJOR DEPRESSIVE DISORDER, SINGLE EPISODE: ICD-10-CM

## 2020-10-27 ENCOUNTER — COMMUNICATION - HEALTHEAST (OUTPATIENT)
Dept: FAMILY MEDICINE | Facility: CLINIC | Age: 60
End: 2020-10-27

## 2020-10-27 ENCOUNTER — COMMUNICATION - HEALTHEAST (OUTPATIENT)
Dept: CARE COORDINATION | Facility: CLINIC | Age: 60
End: 2020-10-27

## 2020-10-28 ENCOUNTER — HOSPITAL ENCOUNTER (OUTPATIENT)
Facility: CLINIC | Age: 60
Discharge: NURSING, REHAB OR LONG-TERM CARE | End: 2020-10-28
Attending: ORTHOPAEDIC SURGERY | Admitting: ORTHOPAEDIC SURGERY
Payer: MEDICARE

## 2020-10-28 ENCOUNTER — COMMUNICATION - HEALTHEAST (OUTPATIENT)
Dept: FAMILY MEDICINE | Facility: CLINIC | Age: 60
End: 2020-10-28

## 2020-10-28 ENCOUNTER — RECORDS - HEALTHEAST (OUTPATIENT)
Dept: FAMILY MEDICINE | Facility: CLINIC | Age: 60
End: 2020-10-28

## 2020-10-28 ENCOUNTER — ANESTHESIA (OUTPATIENT)
Dept: SURGERY | Facility: CLINIC | Age: 60
End: 2020-10-28
Payer: MEDICARE

## 2020-10-28 ENCOUNTER — APPOINTMENT (OUTPATIENT)
Dept: GENERAL RADIOLOGY | Facility: CLINIC | Age: 60
End: 2020-10-28
Attending: ORTHOPAEDIC SURGERY
Payer: MEDICARE

## 2020-10-28 ENCOUNTER — ANESTHESIA EVENT (OUTPATIENT)
Dept: SURGERY | Facility: CLINIC | Age: 60
End: 2020-10-28
Payer: MEDICARE

## 2020-10-28 VITALS
SYSTOLIC BLOOD PRESSURE: 122 MMHG | HEIGHT: 72 IN | HEART RATE: 70 BPM | WEIGHT: 184.2 LBS | OXYGEN SATURATION: 94 % | BODY MASS INDEX: 24.95 KG/M2 | RESPIRATION RATE: 16 BRPM | DIASTOLIC BLOOD PRESSURE: 74 MMHG | TEMPERATURE: 97.1 F

## 2020-10-28 DIAGNOSIS — I67.1 CEREBRAL ANEURYSM, NONRUPTURED: ICD-10-CM

## 2020-10-28 DIAGNOSIS — F32.9 MAJOR DEPRESSIVE DISORDER, SINGLE EPISODE: ICD-10-CM

## 2020-10-28 DIAGNOSIS — G81.91 HEMIPLEGIA AFFECTING RIGHT DOMINANT SIDE, UNSPECIFIED ETIOLOGY, UNSPECIFIED HEMIPLEGIA TYPE (H): ICD-10-CM

## 2020-10-28 DIAGNOSIS — Z71.6 ENCOUNTER FOR TOBACCO USE CESSATION COUNSELING: ICD-10-CM

## 2020-10-28 PROCEDURE — 250N000011 HC RX IP 250 OP 636: Performed by: PHYSICIAN ASSISTANT

## 2020-10-28 PROCEDURE — C1762 CONN TISS, HUMAN(INC FASCIA): HCPCS | Performed by: ORTHOPAEDIC SURGERY

## 2020-10-28 PROCEDURE — 999N000138 HC STATISTIC PRE-PROCEDURE ASSESSMENT I: Performed by: ORTHOPAEDIC SURGERY

## 2020-10-28 PROCEDURE — 250N000011 HC RX IP 250 OP 636: Performed by: NURSE ANESTHETIST, CERTIFIED REGISTERED

## 2020-10-28 PROCEDURE — 360N000024 HC SURGERY LEVEL 3 W FLUORO 1ST 30 MIN: Performed by: ORTHOPAEDIC SURGERY

## 2020-10-28 PROCEDURE — 258N000003 HC RX IP 258 OP 636: Performed by: NURSE ANESTHETIST, CERTIFIED REGISTERED

## 2020-10-28 PROCEDURE — C1713 ANCHOR/SCREW BN/BN,TIS/BN: HCPCS | Performed by: ORTHOPAEDIC SURGERY

## 2020-10-28 PROCEDURE — 250N000011 HC RX IP 250 OP 636: Performed by: ANESTHESIOLOGY

## 2020-10-28 PROCEDURE — 761N000001 HC RECOVERY PHASE 1 LEVEL 1 FIRST HR: Performed by: ORTHOPAEDIC SURGERY

## 2020-10-28 PROCEDURE — 258N000003 HC RX IP 258 OP 636: Performed by: ANESTHESIOLOGY

## 2020-10-28 PROCEDURE — 761N000002 HC RECOVERY PHASE 1 LEVEL 1 EA ADDTL HR: Performed by: ORTHOPAEDIC SURGERY

## 2020-10-28 PROCEDURE — 370N000002 HC ANESTHESIA TECHNICAL FEE, EACH ADDTL 15 MIN: Performed by: ORTHOPAEDIC SURGERY

## 2020-10-28 PROCEDURE — 272N000001 HC OR GENERAL SUPPLY STERILE: Performed by: ORTHOPAEDIC SURGERY

## 2020-10-28 PROCEDURE — 370N000001 HC ANESTHESIA TECHNICAL FEE, 1ST 30 MIN: Performed by: ORTHOPAEDIC SURGERY

## 2020-10-28 PROCEDURE — 250N000009 HC RX 250: Performed by: NURSE ANESTHETIST, CERTIFIED REGISTERED

## 2020-10-28 PROCEDURE — 999N000179 XR SURGERY CARM FLUORO LESS THAN 5 MIN W STILLS

## 2020-10-28 PROCEDURE — 360N000021 HC SURGERY LEVEL 3 EA 15 ADDTL MIN: Performed by: ORTHOPAEDIC SURGERY

## 2020-10-28 PROCEDURE — 278N000051 HC OR IMPLANT GENERAL: Performed by: ORTHOPAEDIC SURGERY

## 2020-10-28 PROCEDURE — 761N000007 HC RECOVERY PHASE 2 EACH 15 MINS: Performed by: ORTHOPAEDIC SURGERY

## 2020-10-28 PROCEDURE — 250N000009 HC RX 250: Performed by: ORTHOPAEDIC SURGERY

## 2020-10-28 DEVICE — GRAFT BONE PUTTY DBX 0.5ML 038005: Type: IMPLANTABLE DEVICE | Site: WRIST | Status: FUNCTIONAL

## 2020-10-28 DEVICE — OSTEOSYNTHESIS COMPRESSION STAPLE
Type: IMPLANTABLE DEVICE | Site: WRIST | Status: FUNCTIONAL
Brand: EASY CLIP

## 2020-10-28 DEVICE — IMP WIRE KIRSCHNER 0.045X4" 78.2020: Type: IMPLANTABLE DEVICE | Site: WRIST | Status: FUNCTIONAL

## 2020-10-28 DEVICE — IMP WIRE KIRSCHNER 0.062X4" 78.2030: Type: IMPLANTABLE DEVICE | Site: WRIST | Status: FUNCTIONAL

## 2020-10-28 DEVICE — PIN STEINMANN BIOMET 3.2MM 9" SS 110003484: Type: IMPLANTABLE DEVICE | Site: WRIST | Status: FUNCTIONAL

## 2020-10-28 RX ORDER — MEPERIDINE HYDROCHLORIDE 25 MG/ML
12.5 INJECTION INTRAMUSCULAR; INTRAVENOUS; SUBCUTANEOUS
Status: DISCONTINUED | OUTPATIENT
Start: 2020-10-28 | End: 2020-10-28 | Stop reason: HOSPADM

## 2020-10-28 RX ORDER — SODIUM CHLORIDE, SODIUM LACTATE, POTASSIUM CHLORIDE, CALCIUM CHLORIDE 600; 310; 30; 20 MG/100ML; MG/100ML; MG/100ML; MG/100ML
INJECTION, SOLUTION INTRAVENOUS CONTINUOUS
Status: DISCONTINUED | OUTPATIENT
Start: 2020-10-28 | End: 2020-10-28 | Stop reason: HOSPADM

## 2020-10-28 RX ORDER — FENTANYL CITRATE 50 UG/ML
50 INJECTION, SOLUTION INTRAMUSCULAR; INTRAVENOUS
Status: DISCONTINUED | OUTPATIENT
Start: 2020-10-28 | End: 2020-10-28 | Stop reason: HOSPADM

## 2020-10-28 RX ORDER — PROPOFOL 10 MG/ML
INJECTION, EMULSION INTRAVENOUS CONTINUOUS PRN
Status: DISCONTINUED | OUTPATIENT
Start: 2020-10-28 | End: 2020-10-28

## 2020-10-28 RX ORDER — OXYCODONE HYDROCHLORIDE 5 MG/1
5 TABLET ORAL ONCE
Status: DISCONTINUED | OUTPATIENT
Start: 2020-10-28 | End: 2020-10-28 | Stop reason: HOSPADM

## 2020-10-28 RX ORDER — ONDANSETRON 2 MG/ML
4 INJECTION INTRAMUSCULAR; INTRAVENOUS EVERY 30 MIN PRN
Status: DISCONTINUED | OUTPATIENT
Start: 2020-10-28 | End: 2020-10-28 | Stop reason: HOSPADM

## 2020-10-28 RX ORDER — BUPIVACAINE HYDROCHLORIDE AND EPINEPHRINE 5; 5 MG/ML; UG/ML
INJECTION, SOLUTION EPIDURAL; INTRACAUDAL; PERINEURAL
Status: DISCONTINUED
Start: 2020-10-28 | End: 2020-10-28 | Stop reason: HOSPADM

## 2020-10-28 RX ORDER — LIDOCAINE HYDROCHLORIDE 20 MG/ML
INJECTION, SOLUTION INFILTRATION; PERINEURAL PRN
Status: DISCONTINUED | OUTPATIENT
Start: 2020-10-28 | End: 2020-10-28

## 2020-10-28 RX ORDER — CEFAZOLIN SODIUM 1 G/3ML
1 INJECTION, POWDER, FOR SOLUTION INTRAMUSCULAR; INTRAVENOUS SEE ADMIN INSTRUCTIONS
Status: DISCONTINUED | OUTPATIENT
Start: 2020-10-28 | End: 2020-10-28 | Stop reason: HOSPADM

## 2020-10-28 RX ORDER — PROPOFOL 10 MG/ML
INJECTION, EMULSION INTRAVENOUS PRN
Status: DISCONTINUED | OUTPATIENT
Start: 2020-10-28 | End: 2020-10-28

## 2020-10-28 RX ORDER — GINSENG 100 MG
CAPSULE ORAL
Status: DISCONTINUED
Start: 2020-10-28 | End: 2020-10-28 | Stop reason: HOSPADM

## 2020-10-28 RX ORDER — FENTANYL CITRATE 50 UG/ML
25-50 INJECTION, SOLUTION INTRAMUSCULAR; INTRAVENOUS EVERY 5 MIN PRN
Status: DISCONTINUED | OUTPATIENT
Start: 2020-10-28 | End: 2020-10-28 | Stop reason: HOSPADM

## 2020-10-28 RX ORDER — DEXAMETHASONE SODIUM PHOSPHATE 4 MG/ML
INJECTION, SOLUTION INTRA-ARTICULAR; INTRALESIONAL; INTRAMUSCULAR; INTRAVENOUS; SOFT TISSUE PRN
Status: DISCONTINUED | OUTPATIENT
Start: 2020-10-28 | End: 2020-10-28

## 2020-10-28 RX ORDER — GINSENG 100 MG
CAPSULE ORAL PRN
Status: DISCONTINUED | OUTPATIENT
Start: 2020-10-28 | End: 2020-10-28 | Stop reason: HOSPADM

## 2020-10-28 RX ORDER — BUPIVACAINE HYDROCHLORIDE AND EPINEPHRINE 5; 5 MG/ML; UG/ML
INJECTION, SOLUTION PERINEURAL PRN
Status: DISCONTINUED | OUTPATIENT
Start: 2020-10-28 | End: 2020-10-28 | Stop reason: HOSPADM

## 2020-10-28 RX ORDER — NALOXONE HYDROCHLORIDE 0.4 MG/ML
.1-.4 INJECTION, SOLUTION INTRAMUSCULAR; INTRAVENOUS; SUBCUTANEOUS
Status: DISCONTINUED | OUTPATIENT
Start: 2020-10-28 | End: 2020-10-28 | Stop reason: HOSPADM

## 2020-10-28 RX ORDER — HYDROMORPHONE HYDROCHLORIDE 1 MG/ML
.3-.5 INJECTION, SOLUTION INTRAMUSCULAR; INTRAVENOUS; SUBCUTANEOUS EVERY 10 MIN PRN
Status: DISCONTINUED | OUTPATIENT
Start: 2020-10-28 | End: 2020-10-28 | Stop reason: HOSPADM

## 2020-10-28 RX ORDER — CEFAZOLIN SODIUM 2 G/100ML
2 INJECTION, SOLUTION INTRAVENOUS
Status: COMPLETED | OUTPATIENT
Start: 2020-10-28 | End: 2020-10-28

## 2020-10-28 RX ORDER — MAGNESIUM HYDROXIDE 1200 MG/15ML
LIQUID ORAL PRN
Status: DISCONTINUED | OUTPATIENT
Start: 2020-10-28 | End: 2020-10-28 | Stop reason: HOSPADM

## 2020-10-28 RX ORDER — ONDANSETRON 2 MG/ML
INJECTION INTRAMUSCULAR; INTRAVENOUS PRN
Status: DISCONTINUED | OUTPATIENT
Start: 2020-10-28 | End: 2020-10-28

## 2020-10-28 RX ORDER — ONDANSETRON 4 MG/1
4 TABLET, ORALLY DISINTEGRATING ORAL EVERY 30 MIN PRN
Status: DISCONTINUED | OUTPATIENT
Start: 2020-10-28 | End: 2020-10-28 | Stop reason: HOSPADM

## 2020-10-28 RX ORDER — FENTANYL CITRATE 0.05 MG/ML
25-50 INJECTION, SOLUTION INTRAMUSCULAR; INTRAVENOUS
Status: DISCONTINUED | OUTPATIENT
Start: 2020-10-28 | End: 2020-10-28 | Stop reason: HOSPADM

## 2020-10-28 RX ADMIN — PROPOFOL 100 MCG/KG/MIN: 10 INJECTION, EMULSION INTRAVENOUS at 07:34

## 2020-10-28 RX ADMIN — SODIUM CHLORIDE, POTASSIUM CHLORIDE, SODIUM LACTATE AND CALCIUM CHLORIDE: 600; 310; 30; 20 INJECTION, SOLUTION INTRAVENOUS at 07:29

## 2020-10-28 RX ADMIN — PHENYLEPHRINE HYDROCHLORIDE 100 MCG: 10 INJECTION INTRAVENOUS at 08:01

## 2020-10-28 RX ADMIN — CEFAZOLIN SODIUM 2 G: 2 INJECTION, SOLUTION INTRAVENOUS at 07:42

## 2020-10-28 RX ADMIN — LIDOCAINE HYDROCHLORIDE 70 MG: 20 INJECTION, SOLUTION INFILTRATION; PERINEURAL at 07:34

## 2020-10-28 RX ADMIN — ONDANSETRON 4 MG: 2 INJECTION INTRAMUSCULAR; INTRAVENOUS at 08:04

## 2020-10-28 RX ADMIN — PROPOFOL 30 MG: 10 INJECTION, EMULSION INTRAVENOUS at 07:44

## 2020-10-28 RX ADMIN — DEXAMETHASONE SODIUM PHOSPHATE 4 MG: 4 INJECTION, SOLUTION INTRA-ARTICULAR; INTRALESIONAL; INTRAMUSCULAR; INTRAVENOUS; SOFT TISSUE at 08:04

## 2020-10-28 RX ADMIN — MIDAZOLAM 1 MG: 1 INJECTION INTRAMUSCULAR; INTRAVENOUS at 07:52

## 2020-10-28 RX ADMIN — MIDAZOLAM HYDROCHLORIDE 1 MG: 1 INJECTION, SOLUTION INTRAMUSCULAR; INTRAVENOUS at 07:03

## 2020-10-28 RX ADMIN — PHENYLEPHRINE HYDROCHLORIDE 0.4 MCG/KG/MIN: 10 INJECTION INTRAVENOUS at 08:01

## 2020-10-28 RX ADMIN — MIDAZOLAM 1 MG: 1 INJECTION INTRAMUSCULAR; INTRAVENOUS at 07:45

## 2020-10-28 RX ADMIN — MIDAZOLAM HYDROCHLORIDE 1 MG: 1 INJECTION, SOLUTION INTRAMUSCULAR; INTRAVENOUS at 07:02

## 2020-10-28 RX ADMIN — CEFAZOLIN SODIUM 1 G: 2 INJECTION, SOLUTION INTRAVENOUS at 09:42

## 2020-10-28 RX ADMIN — PROPOFOL 30 MG: 10 INJECTION, EMULSION INTRAVENOUS at 07:34

## 2020-10-28 ASSESSMENT — MIFFLIN-ST. JEOR: SCORE: 1678.53

## 2020-10-28 ASSESSMENT — ENCOUNTER SYMPTOMS
ORTHOPNEA: 0
SEIZURES: 0
DYSRHYTHMIAS: 0

## 2020-10-28 ASSESSMENT — LIFESTYLE VARIABLES: TOBACCO_USE: 0

## 2020-10-28 ASSESSMENT — COPD QUESTIONNAIRES: COPD: 0

## 2020-10-28 NOTE — OP NOTE
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      PROCEDURE PERFORMED:  Left scaphoid excision and a 4-corner fusion with autogenous bone graft.      SURGEON:  Shi Pate MD      ASSISTANT:  Latonya Kauffman PA-C      ANESTHESIA:  Regional.      TOURNIQUET TIME:  64 minutes      Surgical site was signed by me and confirmed at the time out.  Informed consent was obtained in the holding area and confirmed prior to entrance to the operating room.        PROPHYLACTIC ANTIBIOTICS:  Ancef 2 grams given prior to tourniquet inflation.        DEEP VENOUS THROMBOSIS RISK ASSESSMENT:  SCDs were applied.      DESCRIPTION OF PROCEDURE:  He was brought to the operating room.  His left arm was prepped and draped in the normal standard manner with a Betadine scrub and a Betadine paint.  A timeout was then performed.  His arm was exsanguinated and the tourniquet was inflated to 150 mm above his systolic pressure.  A longitudinal 2.5-inch incision was made over his fourth dorsal compartment and it was carried down through the subcutaneous tissue.  Large dorsal cephalic vein was retracted ulnarly.  The retinaculum over the fourth dorsal compartment was divided obliquely.  The extensor tendons were retracted ulnarly with vessel looping Gelpi retractors.  PIN neurectomy was performed using the cautery knife, resecting 1.5 cm of the posterior interosseous nerve off the radial aspect of the fourth dorsal compartment.  A longitudinal incision was made in the wrist capsule, and this was elevated up radially and ulnarly.  There is a large fracture nonunion dorsally that was removed with a #15 blade.  A threaded Steinmann pin was then introduced into the scaphoid, and this was removed with the curved osteotomes, a #15 blade and a rongeur.  A good portion of it was  dead, but the most distal pole and scaphoid tubercle were healthy, so this was harvested for bone graft for the 4-corner fusion.  Next, articular cartilage was removed from the proximal capitate, proximal hamate, capital hamate joint and then the mid carpal articular cartilage was removed from the lunate and triquetral facet.  I did use the bur on the lunate, as it was severely sclerotic.  A large dorsal osteophyte was also removed with rongeur.  A 0.045 K-wire was introduced in the dorsal longitudinal access of the lunate to take it out of the DISI position.  Once this was done, a 0.045 K-wire was advanced across the triquetrum and the lunate into the radial styloid.  This held the lunate up in neutral position.  I then took the bone graft that we harvested from the scaphoid, placed that in the mid carpal row and then pinned the mid carpal row to the proximal carpal row with a 0.045 K-wire from the triquetrum, across the capitate into the trapezoid.  The positioning was checked on the AP and lateral modes and was exactly the way we wanted it.  I then used a 0.062 K-wire and drilled that into the radial portion of the lunate.  I then used a rongeur to create a trough for the staple and drilled for a lunocapitate staple.  We checked length and position on both AP and lateral modes and a 12, 13, 15 was the first one applied.  A second lunocapitate staple was placed again from the proximal ulnar corner of the lunate into the capitate.  Again, I created a trough with a rongeur to seat the staple and again position was checked on the AP and lateral modes with the C-arm and another staple was applied.  I then decided to do one more staple from the dorsal ulnar corner of the lunate into the hamate.  We got a nice fixation, getting one of the tines into the hook of the hamate, so this was again with a 0.062, the proximal cortex was drilled with the drillbit for the staple and then the staple was placed down both the bones  and positioning checked again with the AP, lateral and oblique modes.  A final staple was placed from triquetrum to hamate and I again placed the 0.062 K-wire down the triquetrum.  I checked a pisotriquetral view so the K-wire did not go across the pisotriquetral joint preserving that and then drilled for the second stoney in the hamate.  The staple was placed without difficulty.  When bringing the wrist through a range of motion, he had nice flexion.  There was 1 staple dorsally in the lunate that there was some dorsal impingement and he did have a dorsal osteophyte there, so I used a rongeur to remove some bone in the dorsal ulnar corner over the lunate to ensure that the patient would get good wrist extension.  Bone wax was applied to the area that was removed of the distal radius to allow for range of motion.  After this was done, Gelfoam was placed in the scaphoid fossa area, Marcaine with epi was injected.  The capsule was closed with 3-0 undyed Vicryl.  The tourniquet was let down.  Bleeding was controlled.  The retinaculum was repaired with 3-0 undyed Vicryl, skin was closed with 3-0 undyed Vicryl and then nylon was used on the skin.  A sterile dressing was applied and then a removable plaster splint was also applied so that the patient could begin early range of motion.         ABELINO ESTES MD             D: 10/28/2020   T: 10/28/2020   MT: MARIELA      Name:     DANA TREVINO   MRN:      [id number]        Account:        [id number]   :      1960           Procedure Date: 10/28/2020      Document: [id number]

## 2020-10-28 NOTE — ANESTHESIA CARE TRANSFER NOTE
Patient: Nino Stephen    Procedure(s):  LEFT WRIST FOUR CORNER FUSION WITH E-Z CLIP STAPLES AND LEFT SCAPHOID EXCISION    Diagnosis: Arthritis of left forearm [M19.032]  Diagnosis Additional Information: No value filed.    Anesthesia Type:   Peripheral Nerve Block     Note:  Airway :Room Air  Patient transferred to:Phase II  Handoff Report: Identifed the Patient, Identified the Reponsible Provider, Reviewed the pertinent medical history, Discussed the surgical course, Reviewed Intra-OP anesthesia mangement and issues during anesthesia, Set expectations for post-procedure period and Allowed opportunity for questions and acknowledgement of understanding      Vitals: (Last set prior to Anesthesia Care Transfer)    CRNA VITALS  10/28/2020 0949 - 10/28/2020 1025      10/28/2020             Resp Rate (set):  10                Electronically Signed By: PORTIA Katz CRNA  October 28, 2020  10:25 AM

## 2020-10-28 NOTE — ANESTHESIA POSTPROCEDURE EVALUATION
Patient: Nino Stephen    Procedure(s):  LEFT WRIST FOUR CORNER FUSION WITH E-Z CLIP STAPLES AND LEFT SCAPHOID EXCISION    Diagnosis:Arthritis of left forearm [M19.032]  Diagnosis Additional Information: No value filed.    Anesthesia Type:  Peripheral Nerve Block    Note:  Anesthesia Post Evaluation    Patient location during evaluation: PACU  Patient participation: Able to participate in evaluation but full recovery from regional anesthesia has not yet ocurrred but is anticipated to occur within 48 hours  Level of consciousness: awake and alert  Pain management: adequate  Airway patency: patent  Cardiovascular status: acceptable and hemodynamically stable  Respiratory status: nonlabored ventilation, unassisted and acceptable  Hydration status: acceptable  PONV: none             Last vitals:  Vitals:    10/28/20 1115 10/28/20 1130 10/28/20 1145   BP: 124/70 111/70    Pulse: 71 72 74   Resp: 12 13 15   Temp:      SpO2: 92% 93% 93%         Electronically Signed By: Oh Olivas MD  October 28, 2020  12:09 PM

## 2020-10-28 NOTE — DISCHARGE INSTRUCTIONS
Same Day Surgery Discharge Instructions for  Sedation and General Anesthesia       It's not unusual to feel dizzy, light-headed or faint for up to 24 hours after surgery or while taking pain medication.  If you have these symptoms: sit for a few minutes before standing and have someone assist you when you get up to walk or use the bathroom.      You should rest and relax for the next 24 hours. We recommend you make arrangements to have an adult stay with you for at least 24 hours after your discharge.  Avoid hazardous and strenuous activity.      DO NOT DRIVE any vehicle or operate mechanical equipment for 24 hours following the end of your surgery.  Even though you may feel normal, your reactions may be affected by the medication you have received.      Do not drink alcoholic beverages for 24 hours following surgery.       Slowly progress to your regular diet as you feel able. It's not unusual to feel nauseated and/or vomit after receiving anesthesia.  If you develop these symptoms, drink clear liquids (apple juice, ginger ale, broth, 7-up, etc. ) until you feel better.  If your nausea and vomiting persists for 24 hours, please notify your surgeon.        All narcotic pain medications, along with inactivity and anesthesia, can cause constipation. Drinking plenty of liquids and increasing fiber intake will help.      For any questions of a medical nature, call your surgeon.      Do not make important decisions for 24 hours.      If you had general anesthesia, you may have a sore throat for a couple of days related to the breathing tube used during surgery.  You may use Cepacol lozenges to help with this discomfort.  If it worsens or if you develop a fever, contact your surgeon.       If you feel your pain is not well managed with the pain medications prescribed by your surgeon, please contact your surgeon's office to let them know so they can address your concerns.       CoVid 19 Information    We want to give you  information regarding Covid. Please consult your primary care provider with any questions you might have.     Patient who have symptoms (cough, fever, or shortness of breath), need to isolate for 7 days from when symptoms started OR 72 hours after fever resolves (without fever reducing medications) AND improvement of respiratory symptoms (whichever is longer).      Isolate yourself at home (in own room/own bathroom if possible)    Do Not allow any visitors    Do Not go to work or school    Do Not go to [employer],  centers, shopping, or other public places.    Do Not shake hands.    Avoid close and intimate contact with others (hugging, kissing).    Follow CDC recommendations for household cleaning of frequently touched services.     After the initial 7 days, continue to isolate yourself from household members as much as possible. To continue decrease the risk of community spread and exposure, you and any members of your household should limit activities in public for 14 days after starting home isolation.     You can reference the following CDC link for helpful home isolation/care tips:  https://www.cdc.gov/coronavirus/2019-ncov/downloads/10Things.pdf    Protect Others:    Cover Your Mouth and Nose with a mask, disposable tissue or wash cloth to avoid spreading germs to others.    Wash your hands and face frequently with soap and water    Call Your Primary Doctor If: Breathing difficulty develops or you become worse.    For more information about COVID19 and options for caring for yourself at home, please visit the CDC website at https://www.cdc.gov/coronavirus/2019-ncov/about/steps-when-sick.html  For more options for care at [facility], please visit our website at [email or web address]        [facility]      DISCHARGE INSTRUCTIONS FOLLOWING   REGIONAL BLOCK ANESTHESIA      Numbness or lack of feeling in the arm/leg that was operated may last up to 24 hours.   "The average time is usually 10-15 hours.  You may not be able to lift or move the arm or leg where the operation was by itself during that time.  Long-acting local anesthetic medicines were used to give you long-lasting pain relief.    Wear a sling until your arm is completely \"awake\"    Avoid bumping your arm, leg or foot while it is numb    Avoid extremes of hot or cold while it is numb    Remain quiet and restful the day of surgery.  Resume normal activities gradually over the next day or so as advise by your surgeon.    Do not drive or operate  Any machinery until your extremity is full  \"awake\"        You will have a tingling and prickly sensation in your arm/leg as the feeling begins to return; you can also expect some discomfort. The amount of discomfort is unpredictable, but if you have more pain that can be controlled with the pain medication you received, you should contact your surgeon.  Start to take your pain pills as soon as you start to feel any discomfort or pain.               [facility] Excisional Arthroplasty Post-Operative Instructions  [facility]  Elaina Pate M.D.    PAIN  Prior to surgery, you received a nerve block to keep you comfortable during your procedure and to reduce your initial post-operative pain. Typically, the nerve block lasts for 12 to 18 hours. The nerve block will feel as though it s wearing off gradually. However, the pain can come on very suddenly and intensely. Be prepared for this by regularly taking the pain medication that was prescribed for you once you start to feel the sensation return to your arm.  Do not fill your post-operative prescriptions at [facility], [facility], [employer] [facility], or [facility]. Their pharmacies will not provide you with more than 6 pain pills/day and this is not adequate after your surgery.  Keep your arm elevated above your heart (above your head is even better) for the first 24 to 48 hours following surgery to reduce the swelling and pain. " If your hand swells and is very painful, you can remove the Ace bandage, cut the gauze along the little finger to loosen the bandage, and then rewrap the Ace bandage.  You might notice an increase in the swelling of your fingers on your third post-operative day. This is normal. The swelling and bruising seep out from under the surgical dressing, becoming trapped in your fingers. Adjust the Ace wrap as described above. Elevate your arm and move your fingers, and the swelling and bruising will resolve.    BANDAGE  Leave your bandage on and keep it clean and dry until you are seen in the clinic. If you take a shower, cover it with plastic wrap or a plastic bag.    SLING  If you receive a sling for your arm, you can wear it until you get home. After that, you should avoid using the sling, otherwise, you can develop neck and shoulder stiffness and discomfort.    ACTIVITIES  After your surgery, begin moving your fingers, but do not move your thumb. You may use your hand for light activities and driving while wearing the bandage. You may also return to work for light duty. Do not do any heavy lifting, pushing, or pulling with your hand.    FOLLOW-UP  You will need to come back for a checkup 10 to 14 days after your surgery. Call [phone] as soon as possible to make your appointment to see Latonya Kauffman PA-C. You can be seen at [facility] ([facility]. [address] [place] [address], 1st floor) on Tuesday or Thursday. Latonya Kauffman, my physician assistant, will examine your incision and take out your stitches.    Call Latonya Kauffman at [phone] between 8:30 a.m. and 5:00 p.m. Monday through Friday if you have:    A fever (101 F or higher)    Redness, swelling, or draining at the surgical site    Nausea, vomiting, or a rash from your medicine(s)    Any questions, problems, or concerns    For emergencies after 5:00 p.m. and on weekends, call the on-call physician at [phone].

## 2020-10-28 NOTE — OR NURSING
PNDS met, po per I&O sheet. Pt dressed, up in recliner and transported to Phase 2. IV left in place

## 2020-10-28 NOTE — BRIEF OP NOTE
"[facility]    Brief Operative Note    Pre-operative diagnosis: Arthritis of left wrist [M19.032]  Post-operative diagnosis same    Procedure: Procedure(s):  LEFT WRIST FOUR CORNER FUSION WITH E-Z CLIP STAPLES AND LEFT SCAPHOID EXCISION  Surgeon: Surgeon(s) and Role:     * Shi Pate MD - Primary     * Latonya Kauffman PA-C - Assisting  Anesthesia: Regional   Estimated blood loss: 20cc  Drains: None  Specimens: * No specimens in log *  Findings:   None.  Complications: None.  Implants:   Implant Name Type Inv. Item Serial No.  Lot No. LRB No. Used Action   duane pin 3/32      Left 1 Used as a Supply   IMP WIRE CATHERINE 0.045X4\" 78.2020 Wire IMP WIRE CATHERINE 0.045X4\" 78.2020  G [employer]OURCE  Left 3 Used as a Supply   GRAFT BONE PUTTY DBX 0.5ML [id number] Bone/Tissue/Biologic GRAFT BONE PUTTY DBX 0.5ML [id number] MUSCULOSKELETAL HARRIS  Left 1 Implanted   IMP STAPLE MMI EASYCLIP SI FOREFOOT [id number] EZB10-15-13 Metallic Hardware/[employer] IMP STAPLE MMI EASYCLIP SI FOREFOOT [id number] EZB10-15-13  MEMOMETAL INC [id number] Left 1 Implanted   IMP STAPLE MMI EASYCLIP SI FOREFOOT [id number] EZB12-15-13 Metallic Hardware/[employer]olson IMP STAPLE MMI EASYCLIP SI FOREFOOT [id number] EZB12-15-13  MEMOMETAL INC [id number] Left 1 Implanted   IMP STAPLE MMI EASYCLIP SI MIDFOOT [id number] DY96-06-32 Metallic Hardware/[employer]olson IMP STAPLE MMI EASYCLIP SI MIDFOOT [id number] EO24-82-39  MEMOMETAL INC [id number] Left 1 Implanted   IMP STAPLE MMI EASYCLIP SI FOREFOOT [id number] EZB12-15-13 Metallic Hardware/[employer]olson IMP STAPLE MMI EASYCLIP SI FOREFOOT [id number] EZB12-15-13  MEMOMETAL INC [id number] Left 1 Implanted   IMP WIRE CATHERINE 0.062X4\" 78.2030 Wire IMP WIRE CATHERINE 0.062X4\" 78.2030  G [employer]OURCE  Left 2 Used as a Supply           "

## 2020-10-28 NOTE — OR NURSING
Patient very sleepy. Will hold off on pain medication at this time. Peripheral nerve block still seems to be in effect and managing patients comfort level well. VSS. Updated contact that he remains pretty sleepy. WIll attempt to get him ready for discharge shortly.

## 2020-10-28 NOTE — ANESTHESIA PREPROCEDURE EVALUATION
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 Authorizing Provider   baclofen (LIORESAL) 10 MG tablet Take 0.5 tablets by mouth 3 times daily.   Yes Reported, Patient   buPROPion (BUDEPRION XL) 150 MG 24 hr tablet Take 1 tablet by mouth 3 times daily.   Yes Reported, Patient   buPROPion (WELLBUTRIN XL) 300 MG 24 hr tablet Take 1 tablet by mouth daily.   Yes Reported, Patient   divalproex (DEPAKOTE) 500 MG EC tablet Take 2 tablets by mouth 2 times daily.   Yes Reported, Patient   methylphenidate (METHYLIN) 10 MG tablet Take 1 tablet by mouth 2 times daily.   Yes Reported, Patient   naproxen (NAPROSYN) 500 MG tablet Take 1 tablet by mouth 2 times daily.   Yes Reported, Patient   zolpidem (AMBIEN) 10 MG tablet Take 1 tablet by mouth nightly as needed.   Yes Reported, Patient   lidocaine (LIDODERM) 5 % patch Place 1-3 patches onto the skin 10/31/19   Reported, Patient   OnabotulinumtoxinA (BOTOX IJ) Inject 550 Units into the muscle. Lot #  [id number] Exp: 2014.  3/8/12   Neymar Daniel MD   OnabotulinumtoxinA, Cosmetic, (BOTOX COSMETIC IM) Inject 595 Units into the muscle.    Reported, Patient   venlafaxine (EFFEXOR) 37.5 MG tablet Take 1 tablet by mouth 2 times daily.    Reported, Patient   VIAGRA 100 MG  OR TABS 1 TABLET DAILY AS NEEDED 10/8/04   Evelio Hernandez MD     Current Facility-Administered Medications Ordered in Epic   Medication Dose Route Frequency Last Rate Last Dose     ceFAZolin (ANCEF) 1 g vial to attach to  ml bag for ADULT or 50 ml bag for PEDS  1 g Intravenous See Admin Instructions         ceFAZolin (ANCEF) intermittent infusion 2 g in 100 mL dextrose PRE-MIX  2 g Intravenous Pre-Op/Pre-procedure x 1 dose         NO tranexamic acid (CYKLOKAPRON) dose needed   Other DOES NOT GO TO MAR         No current [facility]-ordered outpatient medications on file.       NO tranexamic acid       RECENT LABS:   Na 143  K 4.5  Creat 0.75    HGB 14.9  Plt 262       Anesthesia Evaluation     . Pt has had prior anesthetic. Type: General    No history of anesthetic complications          ROS/MED HX    ENT/Pulmonary:      (-) tobacco use, asthma, COPD, sleep apnea and recent URI   Neurologic:     (+)CVA with deficits- Hemiparesis,    (-) seizures and TIA   Cardiovascular:        (-) angina, hypertension, CAD, orthopnea/PND, syncope, arrhythmias, irregular heartbeat/palpitations, valvular problems/murmurs and angina   METS/Exercise Tolerance:  >4 METS   Hematologic:        (-) anemia   Musculoskeletal:   (+) arthritis,  -       GI/Hepatic:        (-) GERD and liver disease   Renal/Genitourinary:      (-) renal disease   Endo:      (-) Type II DM, thyroid disease and chronic steroid usage   Psychiatric:         Infectious Disease:        (-) Recent Fever   Malignancy:         Other: Comment: S/p Traumatic Right Hemothorax, with rib fractures, vertebral fractures 10/19                         Physical Exam  Normal systems: dental    Airway   Mallampati: II  TM distance: >3 FB  Neck ROM: full    Dental     Cardiovascular   Rhythm and rate: regular and normal  (-) no murmur    Pulmonary    breath sounds clear to auscultation(-) no wheezes          Preop Vitals  BP Readings from Last 3 Encounters:   10/28/20 125/87    03/08/12 139/79    Pulse Readings from Last 3 Encounters:   10/28/20 80   03/08/12 92      Resp Readings from Last 3 Encounters:   10/28/20 18    SpO2 Readings from Last 3 Encounters:   10/28/20 96%      Temp Readings from Last 1 Encounters:   10/28/20 36.4  C (97.6  F) (Temporal)    Ht Readings from Last 1 Encounters:   10/28/20 1.829 m (6')      Wt Readings from Last 1 Encounters:   10/28/20 83.6 kg (184 lb 3.2 oz)    Estimated body mass index is 24.98 kg/m  as calculated from the following:    Height as of this encounter: 1.829 m (6').    Weight as of this encounter: 83.6 kg (184 lb 3.2 oz).       Anesthesia Plan      History & Physical Review  History and physical reviewed and following examination; no interval change.    ASA Status:  3 .    NPO Status:  > 8 hours    Plan for Peripheral Nerve Block   PONV prophylaxis:  Ondansetron (or other 5HT-3) and Dexamethasone or Solumedrol         Postoperative Care  Postoperative pain management:  Peripheral nerve block (Single Shot).      Consents  Anesthetic plan, risks, benefits and alternatives discussed with:  Patient..                 Oh Olivas MD

## 2020-10-28 NOTE — ANESTHESIA PROCEDURE NOTES
Procedure note : Brachial plexus (Axillary Brachial Plexus)      Staff -   Anesthesiologist:  Oh Olivas MD  Performed By: Anesthesiologist  Pre-Procedure  Performed by Oh Olivas MD  Location: pre-op    Procedure Times:10/28/2020 6:58 AM and 10/28/2020 7:09 AM  Pre-Anesthestic Checklist: patient identified, IV checked, site marked, risks and benefits discussed, informed consent, monitors and equipment checked, pre-op evaluation, at physician/surgeon's request and post-op pain management    Timeout  Correct Patient: Yes   Correct Procedure: Yes   Correct Site: Yes   Correct Laterality: Yes   Correct Position: Yes   Site Marked: Yes   .   Procedure Documentation    .    Procedure: Brachial plexus (Axillary Brachial Plexus), .   Patient Position:supine (arm externally rotated, hand placed beside head) Insertion site: upper arm/axilla. Local skin infiltrated with 3 mL of 1% lidocaine.    Ultrasound used to identify targeted nerve, plexus, or vascular marker and placed a needle adjacent to it., Ultrasound was used to visualize the spread of the anesthetic in close proximity to the above stated nerve. A permanent image is entered into the patient's record.  Patient Prep/Sterile Barriers; mask, sterile gloves, chlorhexidine gluconate and isopropyl alcohol.  .  Needle: insulated, short bevel   Needle Gauge: 22.    Needle Length (Inches) 2   Insertion Method: Single Shot.        Assessment/Narrative  Paresthesias: No.  Injection made incrementally with aspirations every 3 mL..  The placement was negative for: blood aspirated, painful injection and site bleeding.  Bolus given via needle..   Secured via.   Complications: none. Test dose of mL at. Test dose negative for signs of intravascular, subdural or intrathecal injection. Comments:  Axillary Brachial Plexus Block done with U/S guidance to visualize spread.  30 cc of 0.5% Bupivacaine with 1:400K Epi  + 10 cc 2% Lidocaine - no paresthesias or s/s of  IV injection.   Pt mildly sedated, but communicative throughout procedure.   Pt tolerated well.  No complications.      Ultrasound Interpretation, Peripheral Nerve Block    1. Under ultrasound guidance, the needle was inserted and placed in close proximity to the target nerve(s).  2. Ultrasound was also used to visualize the spread of the anesthetic in close proximity to the nerve(s) being blocked.  Local anesthetic was administered in incremental doses, with intermittent negative aspiration.    3. The nerve(s) appeared anatomically normal.  4. There were no apparent abnormal pathological findings.  5. A permanent ultrasound image was saved in the patient's record.    The surgeon has given a verbal order transferring care of this patient to me for the performance of a regional analgesia block for post-op pain control. It is requested of me because I am uniquely trained and qualified to perform this block and the surgeon is neither trained nor qualified to perform this procedure.

## 2020-10-28 NOTE — OR NURSING
Discharge instructions called to Bernarda at [facility].  Copy of discharge instructions given to Imani, Pt ex - wife.  Discharge instructions, prescriptions placed in pt belongings bag.  Discharge instructions gone over with pt as well.  No questions or concerns. IV discontinued.

## 2020-10-29 ENCOUNTER — OFFICE VISIT - HEALTHEAST (OUTPATIENT)
Dept: GERIATRICS | Facility: CLINIC | Age: 60
End: 2020-10-29

## 2020-10-29 ENCOUNTER — COMMUNICATION - HEALTHEAST (OUTPATIENT)
Dept: CARE COORDINATION | Facility: CLINIC | Age: 60
End: 2020-10-29

## 2020-10-29 DIAGNOSIS — M19.032 PRIMARY OSTEOARTHRITIS OF LEFT WRIST: ICD-10-CM

## 2020-10-29 DIAGNOSIS — M96.0 PSEUDARTHROSIS AFTER FUSION OR ARTHRODESIS: ICD-10-CM

## 2020-10-29 DIAGNOSIS — F32.9 MAJOR DEPRESSIVE DISORDER WITH SINGLE EPISODE, REMISSION STATUS UNSPECIFIED: ICD-10-CM

## 2020-10-29 DIAGNOSIS — F17.200 TOBACCO USE DISORDER: ICD-10-CM

## 2020-10-29 DIAGNOSIS — M24.632: ICD-10-CM

## 2020-10-29 DIAGNOSIS — I69.959 HEMIPLEGIA OF DOMINANT SIDE AS LATE EFFECT FOLLOWING CEREBROVASCULAR DISEASE (H): ICD-10-CM

## 2020-10-29 DIAGNOSIS — I67.1 CEREBRAL ANEURYSM WITHOUT RUPTURE: ICD-10-CM

## 2020-10-30 ENCOUNTER — OFFICE VISIT - HEALTHEAST (OUTPATIENT)
Dept: GERIATRICS | Facility: CLINIC | Age: 60
End: 2020-10-30

## 2020-10-30 DIAGNOSIS — F17.200 TOBACCO USE DISORDER: ICD-10-CM

## 2020-10-30 DIAGNOSIS — I69.920 APHASIA DUE TO LATE EFFECTS OF CEREBROVASCULAR DISEASE: ICD-10-CM

## 2020-10-30 DIAGNOSIS — I60.9 SUBARACHNOID HEMORRHAGE (H): ICD-10-CM

## 2020-10-30 DIAGNOSIS — G81.91 HEMIPLEGIA AFFECTING RIGHT DOMINANT SIDE, UNSPECIFIED ETIOLOGY, UNSPECIFIED HEMIPLEGIA TYPE (H): ICD-10-CM

## 2020-10-30 DIAGNOSIS — I69.959 HEMIPLEGIA OF DOMINANT SIDE AS LATE EFFECT FOLLOWING CEREBROVASCULAR DISEASE (H): ICD-10-CM

## 2020-10-30 DIAGNOSIS — M19.032 ARTHRITIS OF LEFT WRIST: ICD-10-CM

## 2020-11-01 ENCOUNTER — RECORDS - HEALTHEAST (OUTPATIENT)
Dept: LAB | Facility: CLINIC | Age: 60
End: 2020-11-01

## 2020-11-02 ENCOUNTER — OFFICE VISIT - HEALTHEAST (OUTPATIENT)
Dept: GERIATRICS | Facility: CLINIC | Age: 60
End: 2020-11-02

## 2020-11-02 DIAGNOSIS — R52 PAIN: ICD-10-CM

## 2020-11-02 DIAGNOSIS — R53.1 WEAKNESS: ICD-10-CM

## 2020-11-02 DIAGNOSIS — I69.959 HEMIPLEGIA OF DOMINANT SIDE AS LATE EFFECT FOLLOWING CEREBROVASCULAR DISEASE (H): ICD-10-CM

## 2020-11-02 LAB
ERYTHROCYTE [DISTWIDTH] IN BLOOD BY AUTOMATED COUNT: 14.4 % (ref 11–14.5)
HCT VFR BLD AUTO: 39.3 % (ref 40–54)
HGB BLD-MCNC: 12.8 G/DL (ref 14–18)
MCH RBC QN AUTO: 30.5 PG (ref 27–34)
MCHC RBC AUTO-ENTMCNC: 32.6 G/DL (ref 32–36)
MCV RBC AUTO: 94 FL (ref 80–100)
PLATELET # BLD AUTO: 263 THOU/UL (ref 140–440)
PMV BLD AUTO: 11 FL (ref 8.5–12.5)
RBC # BLD AUTO: 4.2 MILL/UL (ref 4.4–6.2)
WBC: 6.6 THOU/UL (ref 4–11)

## 2020-11-02 ASSESSMENT — MIFFLIN-ST. JEOR: SCORE: 1655.85

## 2020-11-03 ENCOUNTER — COMMUNICATION - HEALTHEAST (OUTPATIENT)
Dept: CARE COORDINATION | Facility: CLINIC | Age: 60
End: 2020-11-03

## 2020-11-05 ENCOUNTER — OFFICE VISIT - HEALTHEAST (OUTPATIENT)
Dept: GERIATRICS | Facility: CLINIC | Age: 60
End: 2020-11-05

## 2020-11-05 DIAGNOSIS — M24.632: ICD-10-CM

## 2020-11-05 DIAGNOSIS — I69.959 HEMIPLEGIA OF DOMINANT SIDE AS LATE EFFECT FOLLOWING CEREBROVASCULAR DISEASE (H): ICD-10-CM

## 2020-11-05 DIAGNOSIS — R53.1 WEAKNESS: ICD-10-CM

## 2020-11-05 DIAGNOSIS — M96.0 PSEUDARTHROSIS AFTER FUSION OR ARTHRODESIS: ICD-10-CM

## 2020-11-05 ASSESSMENT — MIFFLIN-ST. JEOR: SCORE: 1655.85

## 2020-11-11 ENCOUNTER — COMMUNICATION - HEALTHEAST (OUTPATIENT)
Dept: GERIATRICS | Facility: CLINIC | Age: 60
End: 2020-11-11

## 2020-11-11 ENCOUNTER — COMMUNICATION - HEALTHEAST (OUTPATIENT)
Dept: NURSING | Facility: CLINIC | Age: 60
End: 2020-11-11

## 2020-11-11 DIAGNOSIS — S22.49XA MULTIPLE RIB FRACTURES: ICD-10-CM

## 2020-11-11 DIAGNOSIS — R52 PAIN: ICD-10-CM

## 2020-11-12 ENCOUNTER — OFFICE VISIT - HEALTHEAST (OUTPATIENT)
Dept: GERIATRICS | Facility: CLINIC | Age: 60
End: 2020-11-12

## 2020-11-12 DIAGNOSIS — M19.032 PRIMARY OSTEOARTHRITIS OF LEFT WRIST: ICD-10-CM

## 2020-11-12 DIAGNOSIS — Z86.73 HISTORY OF CVA (CEREBROVASCULAR ACCIDENT): ICD-10-CM

## 2020-11-12 DIAGNOSIS — M96.0 PSEUDARTHROSIS AFTER FUSION OR ARTHRODESIS: ICD-10-CM

## 2020-11-12 DIAGNOSIS — R53.1 WEAKNESS: ICD-10-CM

## 2020-11-12 DIAGNOSIS — M24.632: ICD-10-CM

## 2020-11-12 DIAGNOSIS — I69.959 HEMIPLEGIA OF DOMINANT SIDE AS LATE EFFECT FOLLOWING CEREBROVASCULAR DISEASE (H): ICD-10-CM

## 2020-11-12 ASSESSMENT — MIFFLIN-ST. JEOR: SCORE: 1679.43

## 2020-11-16 ENCOUNTER — OFFICE VISIT - HEALTHEAST (OUTPATIENT)
Dept: GERIATRICS | Facility: CLINIC | Age: 60
End: 2020-11-16

## 2020-11-16 DIAGNOSIS — R52 PAIN: ICD-10-CM

## 2020-11-16 DIAGNOSIS — I69.959 HEMIPLEGIA OF DOMINANT SIDE AS LATE EFFECT FOLLOWING CEREBROVASCULAR DISEASE (H): ICD-10-CM

## 2020-11-16 DIAGNOSIS — M24.632: ICD-10-CM

## 2020-11-16 DIAGNOSIS — M96.0 PSEUDARTHROSIS AFTER FUSION OR ARTHRODESIS: ICD-10-CM

## 2020-11-16 ASSESSMENT — MIFFLIN-ST. JEOR: SCORE: 1663.1

## 2020-11-30 ENCOUNTER — COMMUNICATION - HEALTHEAST (OUTPATIENT)
Dept: GERIATRICS | Facility: CLINIC | Age: 60
End: 2020-11-30

## 2020-11-30 DIAGNOSIS — I67.1 CEREBRAL ANEURYSM, NONRUPTURED: ICD-10-CM

## 2020-11-30 DIAGNOSIS — G81.91 HEMIPLEGIA AFFECTING RIGHT DOMINANT SIDE, UNSPECIFIED ETIOLOGY, UNSPECIFIED HEMIPLEGIA TYPE (H): ICD-10-CM

## 2020-12-01 ENCOUNTER — RECORDS - HEALTHEAST (OUTPATIENT)
Dept: ADMINISTRATIVE | Facility: OTHER | Age: 60
End: 2020-12-01

## 2020-12-02 ENCOUNTER — RECORDS - HEALTHEAST (OUTPATIENT)
Dept: ADMINISTRATIVE | Facility: OTHER | Age: 60
End: 2020-12-02

## 2020-12-11 ENCOUNTER — OFFICE VISIT - HEALTHEAST (OUTPATIENT)
Dept: GERIATRICS | Facility: CLINIC | Age: 60
End: 2020-12-11

## 2020-12-11 DIAGNOSIS — F17.200 TOBACCO USE DISORDER: ICD-10-CM

## 2020-12-11 DIAGNOSIS — M19.032 ARTHRITIS OF LEFT WRIST: ICD-10-CM

## 2020-12-11 DIAGNOSIS — G81.91 HEMIPLEGIA AFFECTING RIGHT DOMINANT SIDE, UNSPECIFIED ETIOLOGY, UNSPECIFIED HEMIPLEGIA TYPE (H): ICD-10-CM

## 2020-12-14 ENCOUNTER — OFFICE VISIT - HEALTHEAST (OUTPATIENT)
Dept: GERIATRICS | Facility: CLINIC | Age: 60
End: 2020-12-14

## 2020-12-14 DIAGNOSIS — R52 PAIN: ICD-10-CM

## 2020-12-14 DIAGNOSIS — G81.91 HEMIPLEGIA AFFECTING RIGHT DOMINANT SIDE, UNSPECIFIED ETIOLOGY, UNSPECIFIED HEMIPLEGIA TYPE (H): ICD-10-CM

## 2020-12-14 DIAGNOSIS — M19.032 PRIMARY OSTEOARTHRITIS OF LEFT WRIST: ICD-10-CM

## 2020-12-14 DIAGNOSIS — M96.0 PSEUDARTHROSIS AFTER FUSION OR ARTHRODESIS: ICD-10-CM

## 2020-12-14 ASSESSMENT — MIFFLIN-ST. JEOR: SCORE: 1684.88

## 2020-12-17 ENCOUNTER — COMMUNICATION - HEALTHEAST (OUTPATIENT)
Dept: GERIATRICS | Facility: CLINIC | Age: 60
End: 2020-12-17

## 2020-12-17 DIAGNOSIS — G81.91 HEMIPLEGIA AFFECTING RIGHT DOMINANT SIDE, UNSPECIFIED ETIOLOGY, UNSPECIFIED HEMIPLEGIA TYPE (H): ICD-10-CM

## 2020-12-17 DIAGNOSIS — I67.1 CEREBRAL ANEURYSM, NONRUPTURED: ICD-10-CM

## 2020-12-21 ENCOUNTER — OFFICE VISIT - HEALTHEAST (OUTPATIENT)
Dept: GERIATRICS | Facility: CLINIC | Age: 60
End: 2020-12-21

## 2020-12-21 DIAGNOSIS — R52 PAIN: ICD-10-CM

## 2020-12-21 DIAGNOSIS — M96.0 PSEUDARTHROSIS AFTER FUSION OR ARTHRODESIS: ICD-10-CM

## 2020-12-21 DIAGNOSIS — G56.82 PINCHED NERVE IN SHOULDER, LEFT: ICD-10-CM

## 2020-12-21 DIAGNOSIS — M19.032 PRIMARY OSTEOARTHRITIS OF LEFT WRIST: ICD-10-CM

## 2020-12-21 ASSESSMENT — MIFFLIN-ST. JEOR: SCORE: 1687.6

## 2020-12-28 ENCOUNTER — OFFICE VISIT - HEALTHEAST (OUTPATIENT)
Dept: GERIATRICS | Facility: CLINIC | Age: 60
End: 2020-12-28

## 2020-12-28 DIAGNOSIS — M24.632: ICD-10-CM

## 2020-12-28 DIAGNOSIS — I67.1 CEREBRAL ANEURYSM, NONRUPTURED: ICD-10-CM

## 2020-12-28 DIAGNOSIS — M96.0 PSEUDARTHROSIS AFTER JOINT FUSION: ICD-10-CM

## 2020-12-28 DIAGNOSIS — F51.01 PRIMARY INSOMNIA: ICD-10-CM

## 2020-12-28 DIAGNOSIS — G81.91 HEMIPLEGIA AFFECTING RIGHT DOMINANT SIDE, UNSPECIFIED ETIOLOGY, UNSPECIFIED HEMIPLEGIA TYPE (H): ICD-10-CM

## 2020-12-28 DIAGNOSIS — M19.032 PRIMARY OSTEOARTHRITIS OF LEFT WRIST: ICD-10-CM

## 2020-12-28 ASSESSMENT — MIFFLIN-ST. JEOR: SCORE: 1687.6

## 2020-12-31 ENCOUNTER — COMMUNICATION - HEALTHEAST (OUTPATIENT)
Dept: FAMILY MEDICINE | Facility: CLINIC | Age: 60
End: 2020-12-31

## 2021-01-04 ENCOUNTER — COMMUNICATION - HEALTHEAST (OUTPATIENT)
Dept: NURSING | Facility: CLINIC | Age: 61
End: 2021-01-04

## 2021-01-04 ENCOUNTER — COMMUNICATION - HEALTHEAST (OUTPATIENT)
Dept: GERIATRICS | Facility: CLINIC | Age: 61
End: 2021-01-04

## 2021-01-04 ENCOUNTER — AMBULATORY - HEALTHEAST (OUTPATIENT)
Dept: GERIATRICS | Facility: CLINIC | Age: 61
End: 2021-01-04

## 2021-01-05 ENCOUNTER — COMMUNICATION - HEALTHEAST (OUTPATIENT)
Dept: FAMILY MEDICINE | Facility: CLINIC | Age: 61
End: 2021-01-05

## 2021-01-12 ENCOUNTER — OFFICE VISIT - HEALTHEAST (OUTPATIENT)
Dept: FAMILY MEDICINE | Facility: CLINIC | Age: 61
End: 2021-01-12

## 2021-01-12 DIAGNOSIS — E78.5 HYPERLIPIDEMIA, UNSPECIFIED HYPERLIPIDEMIA TYPE: ICD-10-CM

## 2021-01-12 DIAGNOSIS — M19.032 ARTHRITIS OF LEFT WRIST: ICD-10-CM

## 2021-01-12 DIAGNOSIS — G81.91 HEMIPLEGIA AFFECTING RIGHT DOMINANT SIDE, UNSPECIFIED ETIOLOGY, UNSPECIFIED HEMIPLEGIA TYPE (H): ICD-10-CM

## 2021-01-12 DIAGNOSIS — G89.29 WRIST PAIN, CHRONIC, LEFT: ICD-10-CM

## 2021-01-12 DIAGNOSIS — F32.9 MAJOR DEPRESSIVE DISORDER WITH SINGLE EPISODE, REMISSION STATUS UNSPECIFIED: ICD-10-CM

## 2021-01-12 DIAGNOSIS — M25.532 WRIST PAIN, CHRONIC, LEFT: ICD-10-CM

## 2021-01-12 DIAGNOSIS — I67.1 CEREBRAL ANEURYSM, NONRUPTURED: ICD-10-CM

## 2021-01-13 ENCOUNTER — COMMUNICATION - HEALTHEAST (OUTPATIENT)
Dept: FAMILY MEDICINE | Facility: CLINIC | Age: 61
End: 2021-01-13

## 2021-01-13 DIAGNOSIS — F51.01 PRIMARY INSOMNIA: ICD-10-CM

## 2021-01-13 DIAGNOSIS — G81.90 HEMIPLEGIA (H): ICD-10-CM

## 2021-01-13 DIAGNOSIS — F32.9 MAJOR DEPRESSIVE DISORDER WITH SINGLE EPISODE, REMISSION STATUS UNSPECIFIED: ICD-10-CM

## 2021-01-13 DIAGNOSIS — I67.1 CEREBRAL ANEURYSM, NONRUPTURED: ICD-10-CM

## 2021-01-19 ENCOUNTER — COMMUNICATION - HEALTHEAST (OUTPATIENT)
Dept: FAMILY MEDICINE | Facility: CLINIC | Age: 61
End: 2021-01-19

## 2021-01-19 DIAGNOSIS — G81.90 HEMIPLEGIA (H): ICD-10-CM

## 2021-01-19 DIAGNOSIS — F51.01 PRIMARY INSOMNIA: ICD-10-CM

## 2021-01-19 DIAGNOSIS — F32.9 MAJOR DEPRESSIVE DISORDER WITH SINGLE EPISODE, REMISSION STATUS UNSPECIFIED: ICD-10-CM

## 2021-01-19 DIAGNOSIS — I67.1 CEREBRAL ANEURYSM, NONRUPTURED: ICD-10-CM

## 2021-01-20 NOTE — PROGRESS NOTES
BOTULINUM TOXIN PROCEDURE NOTE    Chief Complaint   Patient presents with     RECHECK     [facility] RETURN BOTOX       /82   Pulse 90   Temp 97.9  F (36.6  C)   Resp 16   SpO2 97%       Current Outpatient Medications:      baclofen (LIORESAL) 10 MG tablet, Take 0.5 tablets by mouth 3 times daily., Disp: , Rfl:      buPROPion (BUDEPRION XL) 150 MG 24 hr tablet, Take 1 tablet by mouth 3 times daily., Disp: , Rfl:      buPROPion (WELLBUTRIN XL) 300 MG 24 hr tablet, Take 1 tablet by mouth daily., Disp: , Rfl:      divalproex (DEPAKOTE) 500 MG EC tablet, Take 2 tablets by mouth 2 times daily., Disp: , Rfl:      lidocaine (LIDODERM) 5 % patch, Place 1-3 patches onto the skin, Disp: , Rfl:      methylphenidate (METHYLIN) 10 MG tablet, Take 1 tablet by mouth 2 times daily., Disp: , Rfl:      OnabotulinumtoxinA (BOTOX IJ), Inject 550 Units into the muscle. Lot #  [id number] Exp: 5/2014. , Disp: , Rfl:      OnabotulinumtoxinA, Cosmetic, (BOTOX COSMETIC IM), Inject 595 Units into the muscle., Disp: , Rfl:      venlafaxine (EFFEXOR) 37.5 MG tablet, Take 1 tablet by mouth 2 times daily., Disp: , Rfl:      VIAGRA 100 MG OR TABS, 1 TABLET DAILY AS NEEDED, Disp: 8, Rfl: 5     zolpidem (AMBIEN) 10 MG tablet, Take 1 tablet by mouth nightly as needed., Disp: , Rfl:     Current Facility-Administered Medications:      botulinum toxin type A (BOTOX) 100 units injection 400 Units, 400 Units, Intramuscular, Q90 Days, Becca Fuentes MD     No Known Allergies       PHYSICAL EXAM:  ANKLE AND FOOT PATTERN:  Right ankle in KAFO.  With KAFO off, foot is plantarflexed with 10 to 20 degrees of inversion.  2/4 MAS plantarflexion.  1+/4 MAS inversion.  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  His neurologist is Dr. Butts and he is on baclofen and methylphenidate, which have been helpful.  He is referred to us for botulinum toxin injections for right arm and hand deformity. He was followed by Dr. Daniel and last seen for Botox injections in 3/2012.    He had a recent steroid injection for left wrist osteoarthritis with Dr. Cheng at [facility], which did not help.  He underwent a fusion and scaphoid excision of his left wrist on 10/28/20, after which he went to a transitional care unit for 2 months, until 2021.    He continues to endorse no pain and discomfort to the right arm or leg.  He continues to have spasms in the right wrist and forearm, but this has decreased in frequency.       We reviewed the recommended safety guidelines for  Botox from any vaccine injection, such as the seasonal flu vaccine, by a minimum of 10-14 days with Nino Stephen. He acknowledged understanding.      RESPONSE TO PREVIOUS TREATMENT:  Nino Stephen received 300 units of Botox on 10/22/2020.  He denies any positive or negative response to these injections.    Problems following the previous series of neurotoxin injections included:  No problems reported      BENEFITS BY PATIENT REPORT:  Spasticity Improvement:  Hard to tell due to TCU and less active. Maybe 10% Duration of action:  Unknown due to TCU stay.       BOTULINUM NEUROTOXIN INJECTION PROCEDURES:    VERIFICATION OF PATIENT IDENTIFICATION AND PROCEDURE     Initials   Patient Name mn   Patient  mn   Procedure Verified by: mn     Prior to the start of the procedure and with procedural staff participation, I verbally confirmed the patient s identity using two indicators, relevant allergies, that the procedure was appropriate and matched the consent or emergent situation, and that the correct equipment/implants were  available. Immediately prior to starting the procedure I conducted the Time Out with the procedural staff and re-confirmed the patient s name, procedure, and site/side. (The Joint Commission universal protocol was followed.)  Yes    Sedation (Moderate or Deep): None      Above assessments performed by:  Resident/Fellow         Johnathon Mallory MD          The attending provider was present for the entire procedure documented below.    Becca Fuentes MD       INDICATION/S FOR PROCEDURE/S:  Nino Stephen is a 60 year old patient with spasticity affecting the  right upper extremity and deformity secondary to a diagnosis of spastic hemiparesis with associated  pain, loss of joint motion and loss of volitional motor control.    The goal of his botulinum toxin injection is to improve the work of ambulation during rehab for his left wrist surgery and to prevent injury to the skin of his right palm. Expected occupational functional gains in his right hand will be very limited due to chronic nonuse.    His baseline symptoms have been recalcitrant to oral medications and conservative therapy.  He is here today for an injection of Botox.      GOAL OF PROCEDURE:  The goal of this procedure is to increase active range of motion and decrease pain  associated with spasticity.    TOTAL DOSE ADMINISTERED:  Dose Administered:  300 units Botox, 1:1 dilution  Diluent Used:  Preservative Free Normal Saline  Total Volume of Diluent Used:  3 ml  Lot # /C3 with Expiration Date:  10/2023  NDC #: Botox 100u ([address]-10)    Medication guide was offered to patient and was declined.    CONSENT:  The risks, benefits, and treatment options were discussed with Nino Stephen and he agreed to proceed.      Written consent was obtained by mn.     EQUIPMENT USED:  Needle-37mm stimulating/recording  EMG/NCS Machine  Ultrasound machine    SKIN PREPARATION:  Skin preparation was performed using an alcohol wipe.    GUIDANCE  DESCRIPTION:  Electro-myographic guidance was necessary throughout the procedure to accurately identify all areas of spastic muscles while avoiding injection of non-spastic muscles, neighboring nerves and nearby vascular structures. and Ultrasound guidance was necessary for injections into the posterior tibialis to accurately identify all areas of spastic muscles while avoiding injection of non-spastic muscles, neighboring nerves and nearby vascular structures.     AREA/MUSCLE INJECTED: 300U total  UPPER ARM MUSCLES:  Right Biceps Brachii - 30 units of Botox at 2 site/s.   FOREARM & HAND MUSCLES:  Right Pronator Teres - 20 units of Botox at 1 site/s.   Right Flexor Digitorum Profundus - 20 units of Botox at 1 site/s.   Right Flexor Digitorum Superficials - 20 units of Botox at 1 site/s.   Right Flexor Carpi Radialis - 50 units of Botox at 1 site/s.   Right Flexor Carpi Ulnaris - 50 units of Botox at 1 site/s.   LEG & FOOT MUSCLES:   Right Gastrocnemius - 60 units of Botox at 2 site/s.   Right Tibialis Posterior - 50 units of Botox at 1 site/s - US guided     RESPONSE TO PROCEDURE:  Nino Stephen tolerated the procedure well and there were no immediate complications.  He was allowed to recover for an appropriate period of time and was discharged home in stable condition.    FOLLOW UP:  Nino Stephen was asked to follow up by phone in 7-14 days with Theodora Ayers RN, Care Coordinator, to report his response to this series of injections.  Based on the patient's previous response to this therapy, Nino Stephen was rescheduled for the next series of injections in 12 weeks.    PLAN (Medication Changes, Therapy Orders, Work or Disability Issues, etc.):  -Due to patient's transitional care unit stay, he was not able to perform therapies or his daily activities of living.  Therefore, we repeated same dose at this time.  He would benefit from Physical Therapy for likely deconditioning after being in the  transitional care unit.  In addition, Occupational Therapy for his right upper extremity and hand.  An orthosis for his right hand may be of benefit after assessment from Occupational Therapy.  Home health therapy was ordered due to patient's limited access to transportation during the COVID-19 pandemic.  -Follow-up in 12 weeks for further assessment of increased dose and response.    Johnathon Mallory MD  PM&R resident     Patient was seen and discussed with Dr. Fuentes. She was present during the entire procedure.     Physician Attestation   I spent a total of 25 minutes with the patient, personally observing as the resident/fellow performed the above procedure.     Key findings: unable to quantify his response to the previous series of injections as mentioned above. No change in the total dose or sites of injections today. Will adjust as needed next time. Will definitely benefit from home PT and OT.     Becca Fuentes  Date of Service (when I saw the patient): 1/21/21

## 2021-01-21 ENCOUNTER — COMMUNICATION - HEALTHEAST (OUTPATIENT)
Dept: FAMILY MEDICINE | Facility: CLINIC | Age: 61
End: 2021-01-21

## 2021-01-21 ENCOUNTER — RECORDS - HEALTHEAST (OUTPATIENT)
Dept: ADMINISTRATIVE | Facility: OTHER | Age: 61
End: 2021-01-21

## 2021-01-21 ENCOUNTER — OFFICE VISIT (OUTPATIENT)
Dept: PHYSICAL MEDICINE AND REHAB | Facility: CLINIC | Age: 61
End: 2021-01-21
Payer: MEDICARE

## 2021-01-21 VITALS
HEART RATE: 90 BPM | RESPIRATION RATE: 16 BRPM | OXYGEN SATURATION: 97 % | SYSTOLIC BLOOD PRESSURE: 127 MMHG | DIASTOLIC BLOOD PRESSURE: 82 MMHG | TEMPERATURE: 97.9 F

## 2021-01-21 DIAGNOSIS — G81.10 SPASTIC HEMIPARESIS (H): Primary | ICD-10-CM

## 2021-01-21 DIAGNOSIS — I61.9 HEMORRHAGIC STROKE (H): ICD-10-CM

## 2021-01-21 PROCEDURE — 64644 CHEMODENERV 1 EXTREM 5/> MUS: CPT | Mod: GC | Performed by: PHYSICAL MEDICINE & REHABILITATION

## 2021-01-21 PROCEDURE — 95874 GUIDE NERV DESTR NEEDLE EMG: CPT | Mod: GC | Performed by: PHYSICAL MEDICINE & REHABILITATION

## 2021-01-21 PROCEDURE — 64643 CHEMODENERV 1 EXTREM 1-4 EA: CPT | Mod: GC | Performed by: PHYSICAL MEDICINE & REHABILITATION

## 2021-01-21 PROCEDURE — 99212 OFFICE O/P EST SF 10 MIN: CPT | Mod: GC | Performed by: PHYSICAL MEDICINE & REHABILITATION

## 2021-01-21 NOTE — LETTER
1/21/2021       RE: Nion Stephen  [facility]  [place] [address]     Dear Colleague,    Thank you for referring your patient, Nino Stephen, to the [facility] [facility]. Please see a copy of my visit note below.    BOTULINUM TOXIN PROCEDURE NOTE    Chief Complaint   Patient presents with     RECHECK     [facility] RETURN BOTOX       /82   Pulse 90   Temp 97.9  F (36.6  C)   Resp 16   SpO2 97%       Current Outpatient Medications:      baclofen (LIORESAL) 10 MG tablet, Take 0.5 tablets by mouth 3 times daily., Disp: , Rfl:      buPROPion (BUDEPRION XL) 150 MG 24 hr tablet, Take 1 tablet by mouth 3 times daily., Disp: , Rfl:      buPROPion (WELLBUTRIN XL) 300 MG 24 hr tablet, Take 1 tablet by mouth daily., Disp: , Rfl:      divalproex (DEPAKOTE) 500 MG EC tablet, Take 2 tablets by mouth 2 times daily., Disp: , Rfl:      lidocaine (LIDODERM) 5 % patch, Place 1-3 patches onto the skin, Disp: , Rfl:      methylphenidate (METHYLIN) 10 MG tablet, Take 1 tablet by mouth 2 times daily., Disp: , Rfl:      OnabotulinumtoxinA (BOTOX IJ), Inject 550 Units into the muscle. Lot #  [id number] Exp: 5/2014. , Disp: , Rfl:      OnabotulinumtoxinA, Cosmetic, (BOTOX COSMETIC IM), Inject 595 Units into the muscle., Disp: , Rfl:      venlafaxine (EFFEXOR) 37.5 MG tablet, Take 1 tablet by mouth 2 times daily., Disp: , Rfl:      VIAGRA 100 MG OR TABS, 1 TABLET DAILY AS NEEDED, Disp: 8, Rfl: 5     zolpidem (AMBIEN) 10 MG tablet, Take 1 tablet by mouth nightly as needed., Disp: , Rfl:     Current Facility-Administered Medications:      botulinum toxin type A (BOTOX) 100 units injection 400 Units, 400 Units, Intramuscular, Q90 Days, Becca Fuentes MD     No Known Allergies       PHYSICAL EXAM:  ANKLE AND FOOT PATTERN:  Right ankle in KAFO.  With KAFO off, foot is plantarflexed with 10 to 20 degrees of inversion.   2/4 MAS plantarflexion.  1+/4 MAS inversion.  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  His neurologist is Dr. Butts and he is on baclofen and methylphenidate, which have been helpful.  He is referred to us for botulinum toxin injections for right arm and hand deformity. He was followed by Dr. Daniel and last seen for Botox injections in 3/2012.    He had a recent steroid injection for left wrist osteoarthritis with Dr. Cheng at [facility], which did not help.  He underwent a fusion and scaphoid excision of his left wrist on 10/28/20, after which he went to a transitional care unit for 2 months, until 01/01/2021.    He continues to endorse no pain and discomfort to the right arm or leg.  He continues to have spasms in the right wrist and forearm, but this has decreased in frequency.       We reviewed the recommended safety guidelines for  Botox from any vaccine injection, such as the seasonal flu vaccine, by a minimum of 10-14 days with Nino Stephen. He acknowledged understanding.      RESPONSE TO PREVIOUS TREATMENT:  Nino Stephen received 300 units of Botox on 10/22/2020.  He denies any positive or negative response to these injections.    Problems following the previous series of neurotoxin injections included:  No problems reported      BENEFITS BY PATIENT REPORT:  Spasticity Improvement:  Hard to tell due to TCU and less active. Maybe 10% Duration of action:  Unknown due to TCU stay.       BOTULINUM NEUROTOXIN INJECTION PROCEDURES:    VERIFICATION OF PATIENT IDENTIFICATION AND PROCEDURE     Initials   Patient  Name mn   Patient  mn   Procedure Verified by: mn     Prior to the start of the procedure and with procedural staff participation, I verbally confirmed the patient s identity using two indicators, relevant allergies, that the procedure was appropriate and matched the consent or emergent situation, and that the correct equipment/implants were available. Immediately prior to starting the procedure I conducted the Time Out with the procedural staff and re-confirmed the patient s name, procedure, and site/side. (The Joint Commission universal protocol was followed.)  Yes    Sedation (Moderate or Deep): None      Above assessments performed by:  Resident/Fellow         Johnathon Mallory MD          The attending provider was present for the entire procedure documented below.    Becca Fuentes MD       INDICATION/S FOR PROCEDURE/S:  Nino Stephen is a 60 year old patient with spasticity affecting the  right upper extremity and deformity secondary to a diagnosis of spastic hemiparesis with associated  pain, loss of joint motion and loss of volitional motor control.    The goal of his botulinum toxin injection is to improve the work of ambulation during rehab for his left wrist surgery and to prevent injury to the skin of his right palm. Expected occupational functional gains in his right hand will be very limited due to chronic nonuse.    His baseline symptoms have been recalcitrant to oral medications and conservative therapy.  He is here today for an injection of Botox.      GOAL OF PROCEDURE:  The goal of this procedure is to increase active range of motion and decrease pain  associated with spasticity.    TOTAL DOSE ADMINISTERED:  Dose Administered:  300 units Botox, 1:1 dilution  Diluent Used:  Preservative Free Normal Saline  Total Volume of Diluent Used:  3 ml  Lot # /C3 with Expiration Date:  10/2023  NDC #: Botox 100u ([address]-87)    Medication guide was offered to patient and was  declined.    CONSENT:  The risks, benefits, and treatment options were discussed with Nino Stephen and he agreed to proceed.      Written consent was obtained by mn.     EQUIPMENT USED:  Needle-37mm stimulating/recording  EMG/NCS Machine  Ultrasound machine    SKIN PREPARATION:  Skin preparation was performed using an alcohol wipe.    GUIDANCE DESCRIPTION:  Electro-myographic guidance was necessary throughout the procedure to accurately identify all areas of spastic muscles while avoiding injection of non-spastic muscles, neighboring nerves and nearby vascular structures. and Ultrasound guidance was necessary for injections into the posterior tibialis to accurately identify all areas of spastic muscles while avoiding injection of non-spastic muscles, neighboring nerves and nearby vascular structures.     AREA/MUSCLE INJECTED: 300U total  UPPER ARM MUSCLES:  Right Biceps Brachii - 30 units of Botox at 2 site/s.   FOREARM & HAND MUSCLES:  Right Pronator Teres - 20 units of Botox at 1 site/s.   Right Flexor Digitorum Profundus - 20 units of Botox at 1 site/s.   Right Flexor Digitorum Superficials - 20 units of Botox at 1 site/s.   Right Flexor Carpi Radialis - 50 units of Botox at 1 site/s.   Right Flexor Carpi Ulnaris - 50 units of Botox at 1 site/s.   LEG & FOOT MUSCLES:   Right Gastrocnemius - 60 units of Botox at 2 site/s.   Right Tibialis Posterior - 50 units of Botox at 1 site/s - US guided     RESPONSE TO PROCEDURE:  Nino Stephen tolerated the procedure well and there were no immediate complications.  He was allowed to recover for an appropriate period of time and was discharged home in stable condition.    FOLLOW UP:  Nino Stephen was asked to follow up by phone in 7-14 days with Theodora Ayers RN, Care Coordinator, to report his response to this series of injections.  Based on the patient's previous response to this therapy, Nino Stephen was rescheduled for the next series of injections  in 12 weeks.    PLAN (Medication Changes, Therapy Orders, Work or Disability Issues, etc.):  -Due to patient's transitional care unit stay, he was not able to perform therapies or his daily activities of living.  Therefore, we repeated same dose at this time.  He would benefit from Physical Therapy for likely deconditioning after being in the transitional care unit.  In addition, Occupational Therapy for his right upper extremity and hand.  An orthosis for his right hand may be of benefit after assessment from Occupational Therapy.  Home health therapy was ordered due to patient's limited access to transportation during the COVID-19 pandemic.  -Follow-up in 12 weeks for further assessment of increased dose and response.    Johnathon Mallory MD  PM&R resident     Patient was seen and discussed with Dr. Fuentes. She was present during the entire procedure.     Physician Attestation   I spent a total of 25 minutes with the patient, personally observing as the resident/fellow performed the above procedure.     Key findings: unable to quantify his response to the previous series of injections as mentioned above. No change in the total dose or sites of injections today. Will adjust as needed next time. Will definitely benefit from home PT and OT.     Becca Fuentes  Date of Service (when I saw the patient): 1/21/21

## 2021-01-27 ENCOUNTER — TELEPHONE (OUTPATIENT)
Dept: CARE COORDINATION | Facility: CLINIC | Age: 61
End: 2021-01-27

## 2021-01-27 ENCOUNTER — COMMUNICATION - HEALTHEAST (OUTPATIENT)
Dept: CARE COORDINATION | Facility: CLINIC | Age: 61
End: 2021-01-27

## 2021-01-27 NOTE — TELEPHONE ENCOUNTER
Dear Dr. ALBANIA Fuentes,  [facility] process is that all ordered disciplines will be involved in the development of the plan of care.  The following disciplines were unable to see Nino Stephen; MRN [id number] within the 5 day evaluation window.  There will be a delay in the evalation visit by SN/PT/OT per patient request.    Requesting updated orders.     Please REPLY TO THIS MESSAGE This is considered a verbal order.  Thank you for your timely assistance.    Order for Nino NANDO Stephen; MRN [id number]      Sincerely [facility]  Amy Carey RN CC  [phone]

## 2021-01-28 ENCOUNTER — COMMUNICATION - HEALTHEAST (OUTPATIENT)
Dept: FAMILY MEDICINE | Facility: CLINIC | Age: 61
End: 2021-01-28

## 2021-02-01 ENCOUNTER — COMMUNICATION - HEALTHEAST (OUTPATIENT)
Dept: FAMILY MEDICINE | Facility: CLINIC | Age: 61
End: 2021-02-01

## 2021-02-03 ENCOUNTER — COMMUNICATION - HEALTHEAST (OUTPATIENT)
Dept: FAMILY MEDICINE | Facility: CLINIC | Age: 61
End: 2021-02-03

## 2021-02-04 ENCOUNTER — COMMUNICATION - HEALTHEAST (OUTPATIENT)
Dept: NURSING | Facility: CLINIC | Age: 61
End: 2021-02-04

## 2021-02-05 ENCOUNTER — COMMUNICATION - HEALTHEAST (OUTPATIENT)
Dept: CARE COORDINATION | Facility: CLINIC | Age: 61
End: 2021-02-05

## 2021-02-09 ENCOUNTER — COMMUNICATION - HEALTHEAST (OUTPATIENT)
Dept: FAMILY MEDICINE | Facility: CLINIC | Age: 61
End: 2021-02-09

## 2021-02-11 ENCOUNTER — COMMUNICATION - HEALTHEAST (OUTPATIENT)
Dept: FAMILY MEDICINE | Facility: CLINIC | Age: 61
End: 2021-02-11

## 2021-02-11 DIAGNOSIS — F32.9 MAJOR DEPRESSIVE DISORDER WITH SINGLE EPISODE, REMISSION STATUS UNSPECIFIED: ICD-10-CM

## 2021-02-12 ENCOUNTER — COMMUNICATION - HEALTHEAST (OUTPATIENT)
Dept: FAMILY MEDICINE | Facility: CLINIC | Age: 61
End: 2021-02-12

## 2021-02-14 ENCOUNTER — RECORDS - HEALTHEAST (OUTPATIENT)
Dept: ADMINISTRATIVE | Facility: OTHER | Age: 61
End: 2021-02-14

## 2021-02-21 ENCOUNTER — RECORDS - HEALTHEAST (OUTPATIENT)
Dept: ADMINISTRATIVE | Facility: OTHER | Age: 61
End: 2021-02-21

## 2021-02-22 ENCOUNTER — COMMUNICATION - HEALTHEAST (OUTPATIENT)
Dept: FAMILY MEDICINE | Facility: CLINIC | Age: 61
End: 2021-02-22

## 2021-02-24 ENCOUNTER — COMMUNICATION - HEALTHEAST (OUTPATIENT)
Dept: FAMILY MEDICINE | Facility: CLINIC | Age: 61
End: 2021-02-24

## 2021-02-25 ENCOUNTER — RECORDS - HEALTHEAST (OUTPATIENT)
Dept: ADMINISTRATIVE | Facility: OTHER | Age: 61
End: 2021-02-25

## 2021-02-26 ENCOUNTER — COMMUNICATION - HEALTHEAST (OUTPATIENT)
Dept: FAMILY MEDICINE | Facility: CLINIC | Age: 61
End: 2021-02-26

## 2021-03-01 ENCOUNTER — COMMUNICATION - HEALTHEAST (OUTPATIENT)
Dept: FAMILY MEDICINE | Facility: CLINIC | Age: 61
End: 2021-03-01

## 2021-03-03 ENCOUNTER — OFFICE VISIT - HEALTHEAST (OUTPATIENT)
Dept: FAMILY MEDICINE | Facility: CLINIC | Age: 61
End: 2021-03-03

## 2021-03-03 DIAGNOSIS — G81.91 HEMIPLEGIA AFFECTING RIGHT DOMINANT SIDE, UNSPECIFIED ETIOLOGY, UNSPECIFIED HEMIPLEGIA TYPE (H): ICD-10-CM

## 2021-03-03 DIAGNOSIS — M25.532 WRIST PAIN, CHRONIC, LEFT: ICD-10-CM

## 2021-03-03 DIAGNOSIS — G89.29 WRIST PAIN, CHRONIC, LEFT: ICD-10-CM

## 2021-03-03 DIAGNOSIS — I67.1 CEREBRAL ANEURYSM, NONRUPTURED: ICD-10-CM

## 2021-03-03 DIAGNOSIS — E78.5 HYPERLIPIDEMIA, UNSPECIFIED HYPERLIPIDEMIA TYPE: ICD-10-CM

## 2021-03-03 DIAGNOSIS — F32.9 MAJOR DEPRESSIVE DISORDER WITH SINGLE EPISODE, REMISSION STATUS UNSPECIFIED: ICD-10-CM

## 2021-03-03 ASSESSMENT — PATIENT HEALTH QUESTIONNAIRE - PHQ9: SUM OF ALL RESPONSES TO PHQ QUESTIONS 1-9: 0

## 2021-03-04 ENCOUNTER — RECORDS - HEALTHEAST (OUTPATIENT)
Dept: ADMINISTRATIVE | Facility: OTHER | Age: 61
End: 2021-03-04

## 2021-03-05 ENCOUNTER — COMMUNICATION - HEALTHEAST (OUTPATIENT)
Dept: FAMILY MEDICINE | Facility: CLINIC | Age: 61
End: 2021-03-05

## 2021-03-09 ENCOUNTER — COMMUNICATION - HEALTHEAST (OUTPATIENT)
Dept: FAMILY MEDICINE | Facility: CLINIC | Age: 61
End: 2021-03-09

## 2021-03-09 DIAGNOSIS — I67.1 CEREBRAL ANEURYSM, NONRUPTURED: ICD-10-CM

## 2021-03-09 DIAGNOSIS — G81.90 HEMIPLEGIA (H): ICD-10-CM

## 2021-03-11 ENCOUNTER — COMMUNICATION - HEALTHEAST (OUTPATIENT)
Dept: FAMILY MEDICINE | Facility: CLINIC | Age: 61
End: 2021-03-11

## 2021-03-14 ENCOUNTER — RECORDS - HEALTHEAST (OUTPATIENT)
Dept: ADMINISTRATIVE | Facility: OTHER | Age: 61
End: 2021-03-14

## 2021-03-15 ENCOUNTER — COMMUNICATION - HEALTHEAST (OUTPATIENT)
Dept: FAMILY MEDICINE | Facility: CLINIC | Age: 61
End: 2021-03-15

## 2021-03-15 DIAGNOSIS — F51.01 PRIMARY INSOMNIA: ICD-10-CM

## 2021-03-16 ENCOUNTER — RECORDS - HEALTHEAST (OUTPATIENT)
Dept: ADMINISTRATIVE | Facility: OTHER | Age: 61
End: 2021-03-16

## 2021-03-16 ENCOUNTER — COMMUNICATION - HEALTHEAST (OUTPATIENT)
Dept: FAMILY MEDICINE | Facility: CLINIC | Age: 61
End: 2021-03-16

## 2021-03-23 ENCOUNTER — RECORDS - HEALTHEAST (OUTPATIENT)
Dept: ADMINISTRATIVE | Facility: OTHER | Age: 61
End: 2021-03-23

## 2021-03-24 ENCOUNTER — MEDICAL CORRESPONDENCE (OUTPATIENT)
Dept: HEALTH INFORMATION MANAGEMENT | Facility: CLINIC | Age: 61
End: 2021-03-24
Payer: MEDICARE

## 2021-03-24 ENCOUNTER — RECORDS - HEALTHEAST (OUTPATIENT)
Dept: ADMINISTRATIVE | Facility: OTHER | Age: 61
End: 2021-03-24

## 2021-03-25 ENCOUNTER — COMMUNICATION - HEALTHEAST (OUTPATIENT)
Dept: FAMILY MEDICINE | Facility: CLINIC | Age: 61
End: 2021-03-25

## 2021-03-30 ENCOUNTER — COMMUNICATION - HEALTHEAST (OUTPATIENT)
Dept: FAMILY MEDICINE | Facility: CLINIC | Age: 61
End: 2021-03-30

## 2021-03-30 ENCOUNTER — RECORDS - HEALTHEAST (OUTPATIENT)
Dept: ADMINISTRATIVE | Facility: OTHER | Age: 61
End: 2021-03-30

## 2021-04-02 ENCOUNTER — RECORDS - HEALTHEAST (OUTPATIENT)
Dept: ADMINISTRATIVE | Facility: OTHER | Age: 61
End: 2021-04-02

## 2021-04-05 ENCOUNTER — COMMUNICATION - HEALTHEAST (OUTPATIENT)
Dept: FAMILY MEDICINE | Facility: CLINIC | Age: 61
End: 2021-04-05

## 2021-04-05 ENCOUNTER — RECORDS - HEALTHEAST (OUTPATIENT)
Dept: ADMINISTRATIVE | Facility: OTHER | Age: 61
End: 2021-04-05

## 2021-04-05 DIAGNOSIS — F32.9 MAJOR DEPRESSIVE DISORDER, SINGLE EPISODE: ICD-10-CM

## 2021-04-12 ENCOUNTER — RECORDS - HEALTHEAST (OUTPATIENT)
Dept: ADMINISTRATIVE | Facility: OTHER | Age: 61
End: 2021-04-12

## 2021-04-13 ENCOUNTER — RECORDS - HEALTHEAST (OUTPATIENT)
Dept: ADMINISTRATIVE | Facility: OTHER | Age: 61
End: 2021-04-13

## 2021-04-21 ENCOUNTER — RECORDS - HEALTHEAST (OUTPATIENT)
Dept: ADMINISTRATIVE | Facility: OTHER | Age: 61
End: 2021-04-21

## 2021-04-29 ENCOUNTER — COMMUNICATION - HEALTHEAST (OUTPATIENT)
Dept: FAMILY MEDICINE | Facility: CLINIC | Age: 61
End: 2021-04-29

## 2021-05-03 ENCOUNTER — COMMUNICATION - HEALTHEAST (OUTPATIENT)
Dept: FAMILY MEDICINE | Facility: CLINIC | Age: 61
End: 2021-05-03

## 2021-05-04 ENCOUNTER — RECORDS - HEALTHEAST (OUTPATIENT)
Dept: ADMINISTRATIVE | Facility: OTHER | Age: 61
End: 2021-05-04

## 2021-05-04 ENCOUNTER — COMMUNICATION - HEALTHEAST (OUTPATIENT)
Dept: FAMILY MEDICINE | Facility: CLINIC | Age: 61
End: 2021-05-04

## 2021-05-07 ENCOUNTER — RECORDS - HEALTHEAST (OUTPATIENT)
Dept: FAMILY MEDICINE | Facility: CLINIC | Age: 61
End: 2021-05-07

## 2021-05-10 ENCOUNTER — OFFICE VISIT - HEALTHEAST (OUTPATIENT)
Dept: FAMILY MEDICINE | Facility: CLINIC | Age: 61
End: 2021-05-10

## 2021-05-10 ENCOUNTER — COMMUNICATION - HEALTHEAST (OUTPATIENT)
Dept: TELEHEALTH | Facility: CLINIC | Age: 61
End: 2021-05-10

## 2021-05-10 DIAGNOSIS — I69.919 COGNITIVE DEFICITS AS LATE EFFECT OF CEREBROVASCULAR DISEASE: ICD-10-CM

## 2021-05-10 DIAGNOSIS — I67.1 CEREBRAL ANEURYSM, NONRUPTURED: ICD-10-CM

## 2021-05-10 DIAGNOSIS — S22.41XS CLOSED FRACTURE OF MULTIPLE RIBS OF RIGHT SIDE, SEQUELA: ICD-10-CM

## 2021-05-10 DIAGNOSIS — N40.1 BENIGN PROSTATIC HYPERPLASIA WITH LOWER URINARY TRACT SYMPTOMS, SYMPTOM DETAILS UNSPECIFIED: ICD-10-CM

## 2021-05-10 DIAGNOSIS — I69.920 APHASIA DUE TO LATE EFFECTS OF CEREBROVASCULAR DISEASE: ICD-10-CM

## 2021-05-10 DIAGNOSIS — S32.010S COMPRESSION FRACTURE OF L1 VERTEBRA, SEQUELA: ICD-10-CM

## 2021-05-10 DIAGNOSIS — F32.9 MAJOR DEPRESSIVE DISORDER WITH SINGLE EPISODE, REMISSION STATUS UNSPECIFIED: ICD-10-CM

## 2021-05-10 LAB
PSA SERPL-MCNC: 4.8 NG/ML (ref 0–4.5)
VALPROATE SERPL-MCNC: 94.2 UG/ML (ref 50–150)

## 2021-05-12 ENCOUNTER — COMMUNICATION - HEALTHEAST (OUTPATIENT)
Dept: FAMILY MEDICINE | Facility: CLINIC | Age: 61
End: 2021-05-12

## 2021-05-12 ENCOUNTER — COMMUNICATION - HEALTHEAST (OUTPATIENT)
Dept: SCHEDULING | Facility: CLINIC | Age: 61
End: 2021-05-12

## 2021-05-12 DIAGNOSIS — R97.20 ELEVATED PROSTATE SPECIFIC ANTIGEN (PSA): ICD-10-CM

## 2021-05-12 DIAGNOSIS — N40.1 BENIGN PROSTATIC HYPERPLASIA WITH LOWER URINARY TRACT SYMPTOMS, SYMPTOM DETAILS UNSPECIFIED: ICD-10-CM

## 2021-05-18 ENCOUNTER — COMMUNICATION - HEALTHEAST (OUTPATIENT)
Dept: FAMILY MEDICINE | Facility: CLINIC | Age: 61
End: 2021-05-18

## 2021-05-18 DIAGNOSIS — G47.00 INSOMNIA, UNSPECIFIED TYPE: ICD-10-CM

## 2021-05-19 ENCOUNTER — RECORDS - HEALTHEAST (OUTPATIENT)
Dept: FAMILY MEDICINE | Facility: CLINIC | Age: 61
End: 2021-05-19

## 2021-05-24 ENCOUNTER — COMMUNICATION - HEALTHEAST (OUTPATIENT)
Dept: FAMILY MEDICINE | Facility: CLINIC | Age: 61
End: 2021-05-24

## 2021-05-24 DIAGNOSIS — G81.90 HEMIPLEGIA (H): ICD-10-CM

## 2021-05-24 DIAGNOSIS — I67.1 CEREBRAL ANEURYSM, NONRUPTURED: ICD-10-CM

## 2021-05-25 ENCOUNTER — RECORDS - HEALTHEAST (OUTPATIENT)
Dept: ADMINISTRATIVE | Facility: OTHER | Age: 61
End: 2021-05-25

## 2021-05-26 ASSESSMENT — PATIENT HEALTH QUESTIONNAIRE - PHQ9: SUM OF ALL RESPONSES TO PHQ QUESTIONS 1-9: 0

## 2021-05-27 ENCOUNTER — RECORDS - HEALTHEAST (OUTPATIENT)
Dept: ADMINISTRATIVE | Facility: OTHER | Age: 61
End: 2021-05-27

## 2021-05-27 VITALS
DIASTOLIC BLOOD PRESSURE: 81 MMHG | OXYGEN SATURATION: 99 % | HEART RATE: 90 BPM | TEMPERATURE: 98.1 F | SYSTOLIC BLOOD PRESSURE: 130 MMHG | BODY MASS INDEX: 24.85 KG/M2 | WEIGHT: 183.25 LBS

## 2021-05-27 ASSESSMENT — PATIENT HEALTH QUESTIONNAIRE - PHQ9: SUM OF ALL RESPONSES TO PHQ QUESTIONS 1-9: 0

## 2021-05-27 NOTE — TELEPHONE ENCOUNTER
Patient  Refill Request  Did you contact pharmacy: Yes  Medication name:   Requested Prescriptions     Pending Prescriptions Disp Refills     buPROPion (WELLBUTRIN XL) 150 MG 24 hr tablet 90 tablet 1     Sig: Take 1 tablet (150 mg total) by mouth every morning.     Who prescribed the medication: Marlo Valdez MD   Pharmacy Name and Location: [id number]  Is patient out of medication: Yes  Patient notified refills processed in 72 hours:  yes  Okay to leave a detailed message: yes

## 2021-05-27 NOTE — TELEPHONE ENCOUNTER
Controlled Substance Refill Request  Medication Name:   Requested Prescriptions     Pending Prescriptions Disp Refills     oxyCODONE-acetaminophen (PERCOCET/ENDOCET) 5-325 mg per tablet 40 tablet 0     Sig: Take 1 tablet by mouth every 6 (six) hours as needed for pain. Uses for wrist pain as needed     Date Last Fill: 2/18/19  Pharmacy: [facility]      Submit electronically to pharmacy  Controlled Substance Agreement Date Scanned:   Encounter-Level CSA Scan Date:    There are no encounter-level csa scan date.       Last office visit with prescriber/PCP: 2/18/2019 Marlo Valdez MD OR same dept: 2/18/2019 Marlo Valdez MD OR same specialty: 2/18/2019 Marlo Valdez MD  Last physical: Visit date not found Last MTM visit: Visit date not found

## 2021-05-27 NOTE — TELEPHONE ENCOUNTER
RN cannot approve Refill Request    RN can NOT refill this medication med is not covered by policy/route to provider. Last office visit: 2/18/2019 Marlo Valdez MD Last Physical: Visit date not found Last MTM visit: Visit date not found Last visit same specialty: 2/18/2019 Marlo Valdez MD.  Next visit within 3 mo: Visit date not found  Next physical within 3 mo: Visit date not found      Sara Chao, [facility] Triage/Med Refill 4/7/2019    Requested Prescriptions   Pending Prescriptions Disp Refills     naproxen (NAPROSYN) 500 MG tablet [Pharmacy Med Name: NAPROXEN 500 MG TABLET] 180 tablet 0     Sig: TAKE 1 TABLET BY MOUTH TWICE A DAY       There is no refill protocol information for this order

## 2021-05-27 NOTE — TELEPHONE ENCOUNTER
Refill Approved    Rx renewed per Medication Renewal Policy. Medication was last renewed on 4/20/18.    Arlene Casas, [facility] Triage/Med Refill 3/28/2019     Requested Prescriptions   Pending Prescriptions Disp Refills     buPROPion (WELLBUTRIN XL) 150 MG 24 hr tablet 90 tablet 1     Sig: Take 1 tablet (150 mg total) by mouth every morning.    Tricyclics/Misc Antidepressant/Antianxiety Meds Refill Protocol Passed - 3/28/2019  2:48 PM       Passed - PCP or prescribing provider visit in last year    Last office visit with prescriber/PCP: 2/18/2019 Marlo Valdez MD OR same dept: 2/18/2019 Marlo Valdez MD OR same specialty: 2/18/2019 Marlo Valdez MD  Last physical: Visit date not found Last MTM visit: Visit date not found   Next visit within 3 mo: Visit date not found  Next physical within 3 mo: Visit date not found  Prescriber OR PCP: Marlo Valdez MD  Last diagnosis associated with med order: 1. Major depressive disorder, single episode  - buPROPion (WELLBUTRIN XL) 150 MG 24 hr tablet; Take 1 tablet (150 mg total) by mouth every morning.  Dispense: 90 tablet; Refill: 1    If protocol passes may refill for 12 months if within 3 months of last provider visit (or a total of 15 months).

## 2021-05-27 NOTE — TELEPHONE ENCOUNTER
Controlled Substance Refill Request  Medication:   Requested Prescriptions     Pending Prescriptions Disp Refills     zolpidem (AMBIEN) 5 MG tablet [Pharmacy Med Name: ZOLPIDEM TARTRATE 5 MG TABLET] 30 tablet 0     Sig: TAKE 1 TABLET (5 MG TOTAL) BY MOUTH AT BEDTIME AS NEEDED FOR SLEEP.     Date Last Fill: 3/12/19  [id number]   Submit electronically to pharmacy  Controlled Substance Agreement on File:   Encounter-Level CSA Scan Date:    There are no encounter-level csa scan date.       Last office visit: Last office visit pertaining to requested medication was 2/18/19.

## 2021-05-28 NOTE — TELEPHONE ENCOUNTER
Called pt and informed him of medication approval. He stated understanding with no additional questions/concerns.

## 2021-05-28 NOTE — TELEPHONE ENCOUNTER
Patient stated he is out of his zolpidem.     Controlled Substance Refill Request  Medication Name:   Requested Prescriptions     Pending Prescriptions Disp Refills     zolpidem (AMBIEN) 5 MG tablet [Pharmacy Med Name: ZOLPIDEM TARTRATE 5 MG TABLET] 30 tablet 0     Sig: TAKE 1 TABLET (5 MG TOTAL) BY MOUTH AT BEDTIME AS NEEDED FOR SLEEP.     Date Last Fill: 4/11/19  Pharmacy: [facility]      Submit electronically to pharmacy  Controlled Substance Agreement Date Scanned:   Encounter-Level CSA Scan Date:    There are no encounter-level csa scan date.       Last office visit with prescriber/PCP: 2/18/2019 Marlo Valdez MD OR same dept: 2/18/2019 Marlo Valdez MD OR same specialty: 2/18/2019 Marlo Valdez MD  Last physical: Visit date not found Last MTM visit: Visit date not found       __________________________    Controlled Substance Refill Request  Medication:    Disp Refills Start End    oxyCODONE-acetaminophen (PERCOCET/ENDOCET) 5-325 mg per tablet 40 tablet 0 3/28/2019     Sig - Route: Take 1 tablet by mouth every 6 (six) hours as needed for pain. Uses for wrist pain as needed - Oral    Sent to pharmacy as: oxyCODONE-acetaminophen (PERCOCET/ENDOCET) 5-325 mg per tablet    Earliest Fill Date: 3/28/2019    E-Prescribing Status: Receipt confirmed by pharmacy (3/28/2019  3:59 PM CDT)      Date Last Fill: 3/28/19  Pharmacy: [facility] [place]   Submit electronically to pharmacy  Controlled Substance Agreement on File:   Encounter-Level CSA Scan Date:    There are no encounter-level csa scan date.       Last office visit: 2/18/2019 Marlo Valdez MD

## 2021-05-29 ENCOUNTER — RECORDS - HEALTHEAST (OUTPATIENT)
Dept: ADMINISTRATIVE | Facility: CLINIC | Age: 61
End: 2021-05-29

## 2021-05-29 NOTE — TELEPHONE ENCOUNTER
Patient Returning Call  Reason for call:  Patient returning call  Information relayed to patient:  Message below regarding refill requests and need to an appointment relayed to the patient.  Patient stated understanding.  Writer offered to transfer patient to scheduled, but patient declined at this time and stated he will call back once he knows what days he is available.   Patient has additional questions:  No  If YES, what are your questions/concerns:  N/a  Okay to leave a detailed message?: No call back needed

## 2021-05-29 NOTE — TELEPHONE ENCOUNTER
Controlled Substance Refill Request  Medication: OxyCODONE-acetaminophen (PERCOCET/ENDOCET) 5-325 mg per tablet  Requested Prescriptions     Pending Prescriptions Disp Refills     zolpidem (AMBIEN) 5 MG tablet [Pharmacy Med Name: ZOLPIDEM TARTRATE 5 MG TABLET] 30 tablet 0     Sig: TAKE 1 TABLET (5 MG TOTAL) BY MOUTH AT BEDTIME AS NEEDED FOR SLEEP.     Date Last Fill:  5/13/19   Pharmacy:  [facility] - [facility]    Submit electronically to pharmacy  Controlled Substance Agreement on File:   Encounter-Level CSA Scan Date:    There are no encounter-level csa scan date.       Last office visit: 2/18/2019 Marlo Valdez MD

## 2021-05-29 NOTE — TELEPHONE ENCOUNTER
Call and left message for pt to call clinic back.  Okay to let pt know his Ambien was refilled but his Percocet was denied due to needing a visit with PCP. Please help schedule appointment upon return call.

## 2021-05-30 ENCOUNTER — RECORDS - HEALTHEAST (OUTPATIENT)
Dept: ADMINISTRATIVE | Facility: CLINIC | Age: 61
End: 2021-05-30

## 2021-05-30 VITALS — WEIGHT: 191 LBS | BODY MASS INDEX: 25.9 KG/M2

## 2021-05-30 NOTE — TELEPHONE ENCOUNTER
RN cannot approve Refill Request    RN can NOT refill this medication med is not covered by policy/route to provider. Last office visit: 2/18/2019 Marlo Valdez MD Last Physical: Visit date not found Last MTM visit: Visit date not found Last visit same specialty: 2/18/2019 Marlo Valdez MD.  Next visit within 3 mo: Visit date not found  Next physical within 3 mo: Visit date not found      Claudette Crabtree, [facility] Triage/Med Refill 7/6/2019    Requested Prescriptions   Pending Prescriptions Disp Refills     naproxen (NAPROSYN) 500 MG tablet [Pharmacy Med Name: NAPROXEN 500 MG TABLET] 180 tablet 0     Sig: TAKE 1 TABLET BY MOUTH TWICE A DAY       There is no refill protocol information for this order

## 2021-05-31 VITALS — HEIGHT: 73 IN | WEIGHT: 199 LBS | BODY MASS INDEX: 26.37 KG/M2

## 2021-05-31 NOTE — PROGRESS NOTES
Subjective: Patient comes in for follow-up he had the aneurysm which ruptured back in 2006 has right eamon-para-cysts.  Unchanged.  He does use a cane    I filled out a form for [employer].    Patient had labs on 2/2019 with CMP lipid PSA CBC.    He does have 1+ BPH and uses tamsulosin that is been stable    Continues on depression medications of venlafaxine and bupropion and Zoloft.    He sees Dr. Butts from neurology I did check a Depakote level today.  He is also on methylphenidate    Patient on review of systems has had some ongoing left wrist pain unfortunately is been chronic he does use occasional oxycodone/Percocet.  I did refill that today but I want him to see the hand surgeon again.  They have talked about possible fusion.  We discussed the risks of long-term narcotic use.    He does not take him overly often he did have 40 tablets back in May.    Tobacco status: He  reports that he has quit smoking. His smoking use included cigarettes. He has never used smokeless tobacco.    Patient Active Problem List    Diagnosis Date Noted     Inguinal Hernia      Aneurysm Of The Basilar Tip      Major Depression, Single Episode      Chronic Cutaneous Ulcer Venous Stasis      Contusion Of The Right Chest Wall With Intact Skin Surface      Hyperlipidemia      Hemiparesis      Nicotine Dependence        Current Outpatient Medications   Medication Sig Dispense Refill     buPROPion (WELLBUTRIN XL) 150 MG 24 hr tablet Take 1 tablet (150 mg total) by mouth every morning. 90 tablet 2     divalproex (DEPAKOTE) 500 MG EC tablet TAKE 2 TABLETS BY MOUTH TWICE A  tablet 3     methylphenidate (RITALIN) 10 MG tablet Take 5-10 mg by mouth 2 (two) times a day.        oxyCODONE-acetaminophen (PERCOCET/ENDOCET) 5-325 mg per tablet Take 1 tablet by mouth every 6 (six) hours as needed for pain. Uses for wrist pain as needed 40 tablet 0     sildenafil (VIAGRA) 100 MG tablet TAKE 1 TABLET BY MOUTH PRIOR TO SEXUAL ACTIVITY AS  NEEDED. 6 tablet 1     sildenafil, antihypertensive, (REVATIO) 20 mg tablet 2-3 po 1 hour prior to sex as needed 30 tablet 3     tadalafil (CIALIS) 5 MG tablet TAKE 1 TABLET BY MOUTH ONCE DAILY 30 tablet 2     tamsulosin (FLOMAX) 0.4 mg cap Take 1 capsule (0.4 mg total) by mouth daily. 30 capsule 5     venlafaxine (EFFEXOR-XR) 75 MG 24 hr capsule TAKE 3 CAPSULES BY MOUTH DAILY 270 capsule 3     zolpidem (AMBIEN) 5 MG tablet Take 1 tablet (5 mg total) by mouth at bedtime as needed for sleep. 30 tablet 2     naproxen (NAPROSYN) 500 MG tablet TAKE 1 TABLET BY MOUTH TWICE A  tablet 0     No current facility-administered medications for this visit.        ROS:   10 point review of systems positive as outlined above.  He does feel like his depression is under control no change in meds   Objective:    /72 (Patient Site: Left Arm, Patient Position: Sitting, Cuff Size: Adult Large)   Pulse 90   Resp 16   Wt 200 lb (90.7 kg)   BMI 26.75 kg/m    Body mass index is 26.75 kg/m .      General appearance no acute distress    HEENT neck supple oropharynx clear pupils react normally extract movements full    Lungs are clear no rales or rhonchi heart was regular rate at 90    Abdomen is soft bowel sounds normal.    Lower extremities without edema    Patient has pain throughout the wrist more dorsal.    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    Right hemiparesis as noted previously unchanged.  He does use a cane.      Depakote level pending      Assessment:  1. Aneurysm Of The Basilar Tip     2. Hyperlipidemia, unspecified hyperlipidemia type     3. Major depressive disorder with single episode, remission status unspecified     4. Hemiplegia, unspecified etiology, unspecified hemiplegia type, unspecified laterality (H)  Valproic Acid (Depakene )   5. Major depressive disorder, single episode  zolpidem (AMBIEN) 5 MG tablet   6. Wrist pain, chronic, left  Ambulatory  referral to Orthopedic Surgery    oxyCODONE-acetaminophen (PERCOCET/ENDOCET) 5-325 mg per tablet     Aneurysm of basilar tip rupture.    Ongoing right hemiparesis.    Depakote level pending    Continue on the methylphenidate    Her depression he has been controlled on venlafaxine and bupropion    Plan: As outlined previous lipids look good LDL was 70.  Blood pressure is controlled as well    Refill on the zolpidem for his sleep and oxycodone 40 tablets    See the hand surgeon    Recheck 4 months    This transcription uses voice recognition software, which may contain typographical errors.

## 2021-05-31 NOTE — TELEPHONE ENCOUNTER
Called and spoke with pt , Message was given, pt understood, no further questions.  Appointment was offer, Patient declined.

## 2021-06-01 ENCOUNTER — RECORDS - HEALTHEAST (OUTPATIENT)
Dept: ADMINISTRATIVE | Facility: OTHER | Age: 61
End: 2021-06-01

## 2021-06-01 VITALS — HEIGHT: 73 IN | BODY MASS INDEX: 24.75 KG/M2 | WEIGHT: 186.7 LBS

## 2021-06-01 VITALS — WEIGHT: 203 LBS | HEIGHT: 73 IN | BODY MASS INDEX: 26.9 KG/M2

## 2021-06-01 NOTE — TELEPHONE ENCOUNTER
Due for physical in Nov (Dr. Valdez saw in July and wanted to see him back in 4 months), please schedule.    Renal function OK in Feb.    Health Maintenance Due   Topic Date Due     DEPRESSION FOLLOW UP  1960     HEPATITIS C SCREENING  1960     HIV SCREENING  08/28/1974     MEDICARE ANNUAL WELLNESS VISIT  08/28/1977     ZOSTER VACCINES (1 of 2) 08/28/2009     ADVANCE CARE PLANNING  01/24/2017     INFLUENZA VACCINE RULE BASED (1) 08/01/2019     No future appointments.

## 2021-06-01 NOTE — TELEPHONE ENCOUNTER
Refill Approved    Rx renewed per Medication Renewal Policy. Medication was last renewed on 8/20/18.    Arlene Casas, [facility] Triage/Med Refill 9/18/2019     Requested Prescriptions   Pending Prescriptions Disp Refills     divalproex (DEPAKOTE DR) 500 MG 12 hour tablet [Pharmacy Med Name: DIVALPROEX SOD  MG TAB] 360 tablet 3     Sig: TAKE 2 TABLETS BY MOUTH TWICE A DAY       Divalproex/Valproic Acid Refill Protocol Passed - 9/18/2019  6:07 PM        Passed - LFT or AST or ALT in last 12 months     Albumin   Date Value Ref Range Status   02/18/2019 3.4 (L) 3.5 - 5.0 g/dL Final     Bilirubin, Total   Date Value Ref Range Status   02/18/2019 0.2 0.0 - 1.0 mg/dL Final     Bilirubin, Direct   Date Value Ref Range Status   01/30/2017 0.1 <=0.5 mg/dL Final     Alkaline Phosphatase   Date Value Ref Range Status   02/18/2019 67 45 - 120 U/L Final     AST   Date Value Ref Range Status   02/18/2019 14 0 - 40 U/L Final     ALT   Date Value Ref Range Status   02/18/2019 12 0 - 45 U/L Final     Protein, Total   Date Value Ref Range Status   02/18/2019 6.5 6.0 - 8.0 g/dL Final                Passed - CBC w/ plts (Hm2) in last 12 months      WBC   Date Value Ref Range Status   02/18/2019 7.5 4.0 - 11.0 thou/uL Final     Hemoglobin   Date Value Ref Range Status   02/18/2019 14.5 14.0 - 18.0 g/dL Final     Hematocrit   Date Value Ref Range Status   02/18/2019 44.0 40.0 - 54.0 % Final     Platelets   Date Value Ref Range Status   02/18/2019 236 140 - 440 thou/uL Final             Passed - PCP or prescribing provider visit in last 12 months       Last office visit with prescriber/PCP: 7/31/2019 Marlo Valdez MD OR same dept: 7/31/2019 Marlo Valdez MD OR same specialty: 7/31/2019 Marlo Valdez MD  Last physical: Visit date not found Last MTM visit: Visit date not found   Next visit within 3 mo: Visit date not found  Next physical within 3 mo: Visit date not found  Prescriber OR PCP: Marlo Valdez MD  Last  diagnosis associated with med order: 1. Hemiparesis  - divalproex (DEPAKOTE DR) 500 MG 12 hour tablet [Pharmacy Med Name: DIVALPROEX SOD  MG TAB]; TAKE 2 TABLETS BY MOUTH TWICE A DAY  Dispense: 360 tablet; Refill: 3    2. Aneurysm Of The Basilar Tip  - divalproex (DEPAKOTE DR) 500 MG 12 hour tablet [Pharmacy Med Name: DIVALPROEX SOD  MG TAB]; TAKE 2 TABLETS BY MOUTH TWICE A DAY  Dispense: 360 tablet; Refill: 3    If protocol passes may refill for 12 months if within 3 months of last provider visit (or a total of 15 months).

## 2021-06-01 NOTE — TELEPHONE ENCOUNTER
Refill Approved    Rx renewed per Medication Renewal Policy. Medication was last renewed on 7.31.19.    Farheen Giron, [facility] Triage/Med Refill 9/20/2019     Requested Prescriptions   Pending Prescriptions Disp Refills     venlafaxine (EFFEXOR-XR) 75 MG 24 hr capsule 270 capsule 3     Sig: Take 3 capsules (225 mg total) by mouth daily.       Venlafaxine/Desvenlafaxine Refill Protocol Passed - 9/20/2019 10:12 AM        Passed - LFT or AST or ALT in last year     Albumin   Date Value Ref Range Status   02/18/2019 3.4 (L) 3.5 - 5.0 g/dL Final     Bilirubin, Total   Date Value Ref Range Status   02/18/2019 0.2 0.0 - 1.0 mg/dL Final     Bilirubin, Direct   Date Value Ref Range Status   01/30/2017 0.1 <=0.5 mg/dL Final     Alkaline Phosphatase   Date Value Ref Range Status   02/18/2019 67 45 - 120 U/L Final     AST   Date Value Ref Range Status   02/18/2019 14 0 - 40 U/L Final     ALT   Date Value Ref Range Status   02/18/2019 12 0 - 45 U/L Final     Protein, Total   Date Value Ref Range Status   02/18/2019 6.5 6.0 - 8.0 g/dL Final                Passed - Fasting lipid cascade in last year     Cholesterol   Date Value Ref Range Status   02/18/2019 151 <=199 mg/dL Final     Triglycerides   Date Value Ref Range Status   02/18/2019 183 (H) <=149 mg/dL Final     HDL Cholesterol   Date Value Ref Range Status   02/18/2019 44 >=40 mg/dL Final     LDL Calculated   Date Value Ref Range Status   02/18/2019 70 <=129 mg/dL Final     Patient Fasting > 8hrs?   Date Value Ref Range Status   02/18/2019 Yes  Final             Passed - PCP or prescribing provider visit in last year     Last office visit with prescriber/PCP: 7/31/2019 Marlo Valdez MD OR same dept: 7/31/2019 Marlo Valdez MD OR same specialty: 7/31/2019 Marlo Valdez MD  Last physical: Visit date not found Last MTM visit: Visit date not found   Next visit within 3 mo: Visit date not found  Next physical within 3 mo: Visit date not  found  Prescriber OR PCP: Marlo Valdez MD  Last diagnosis associated with med order: 1. Major depressive disorder, single episode  - venlafaxine (EFFEXOR-XR) 75 MG 24 hr capsule; Take 3 capsules (225 mg total) by mouth daily.  Dispense: 270 capsule; Refill: 3    If protocol passes may refill for 12 months if within 3 months of last provider visit (or a total of 15 months).             Passed - Blood Pressure in last year     BP Readings from Last 1 Encounters:   07/31/19 119/72

## 2021-06-01 NOTE — TELEPHONE ENCOUNTER
Controlled Substance Refill Request  Medication Name:   Requested Prescriptions     Pending Prescriptions Disp Refills     oxyCODONE-acetaminophen (PERCOCET/ENDOCET) 5-325 mg per tablet 40 tablet 0     Sig: Take 1 tablet by mouth every 6 (six) hours as needed for pain. Uses for wrist pain as needed     Date Last Fill: 8/13/2019  Pharmacy: [id number] - [place], [place] - [address]      Submit electronically to pharmacy  Controlled Substance Agreement Date Scanned:   Encounter-Level CSA Scan Date:    There are no encounter-level csa scan date.       Last office visit with prescriber/PCP: 7/31/2019 Marlo Valdez MD OR same dept: 7/31/2019 Marlo Valdez MD OR same specialty: 7/31/2019 Marlo Valdez MD  Last physical: Visit date not found Last MTM visit: Visit date not found

## 2021-06-01 NOTE — TELEPHONE ENCOUNTER
RN cannot approve Refill Request    RN can NOT refill this medication med is not covered by policy/route to provider     . Last office visit: 7/31/2019 Marlo Valdez MD Last Physical: Visit date not found Last MTM visit: Visit date not found Last visit same specialty: 7/31/2019 Marlo Valdez MD.  Next visit within 3 mo: Visit date not found  Next physical within 3 mo: Visit date not found      Arlene Casas, [facility] Triage/Med Refill 10/1/2019    Requested Prescriptions   Pending Prescriptions Disp Refills     naproxen (NAPROSYN) 500 MG tablet [Pharmacy Med Name: NAPROXEN 500 MG TABLET] 180 tablet 3     Sig: TAKE 1 TABLET BY MOUTH TWICE A DAY       There is no refill protocol information for this order

## 2021-06-01 NOTE — TELEPHONE ENCOUNTER
FYI - Status Update  Who is Calling: Patient  Update: The patient is requesting an expedited refill due to being out of the medication. The patient was reminded to please request refills three business days in advance prior to running out as a courtesy to the provider.  Okay to leave a detailed message?:  Yes

## 2021-06-02 ENCOUNTER — RECORDS - HEALTHEAST (OUTPATIENT)
Dept: FAMILY MEDICINE | Facility: CLINIC | Age: 61
End: 2021-06-02

## 2021-06-02 VITALS — BODY MASS INDEX: 25.15 KG/M2 | WEIGHT: 188 LBS

## 2021-06-02 NOTE — TELEPHONE ENCOUNTER
Patient reported he is out of his medication.     Controlled Substance Refill Request  Medication Name:   Requested Prescriptions     Pending Prescriptions Disp Refills     oxyCODONE-acetaminophen (PERCOCET/ENDOCET) 5-325 mg per tablet 40 tablet 0     Sig: Take 1 tablet by mouth every 6 (six) hours as needed for pain. Uses for wrist pain as needed     Date Last Fill: 9/6/2019  Pharmacy: [facility])       Submit electronically to pharmacy  Controlled Substance Agreement Date Scanned:   Encounter-Level CSA Scan Date:    There are no encounter-level csa scan date.       Last office visit with prescriber/PCP: 7/31/2019 Marlo Valdez MD OR same dept: 7/31/2019 Marlo Valdez MD OR same specialty: 7/31/2019 Marlo Valdez MD  Last physical: Visit date not found Last MTM visit: Visit date not found

## 2021-06-02 NOTE — TELEPHONE ENCOUNTER
Who is calling:  patient  Reason for Call:  Patient is scheduled for his physical and med check on 10/29 at 8 am.   Date of last appointment with primary care: 07/31/2019  Okay to leave a detailed message: Yes

## 2021-06-03 VITALS
WEIGHT: 191 LBS | DIASTOLIC BLOOD PRESSURE: 72 MMHG | SYSTOLIC BLOOD PRESSURE: 98 MMHG | HEIGHT: 72 IN | OXYGEN SATURATION: 97 % | HEART RATE: 72 BPM | TEMPERATURE: 97.4 F | RESPIRATION RATE: 16 BRPM | BODY MASS INDEX: 25.87 KG/M2

## 2021-06-03 VITALS — WEIGHT: 200 LBS | BODY MASS INDEX: 26.75 KG/M2

## 2021-06-03 NOTE — TELEPHONE ENCOUNTER
Orders being requested: Occupational Therapy 2 times a week for 2 weeks.  Reason service is needed/diagnosis: Fall with multiple rib fracture and work on home safety  When are orders needed by: today  Where to send Orders: Phone:  [phone]  Okay to leave detailed message?  Yes

## 2021-06-03 NOTE — TELEPHONE ENCOUNTER
Patient has a future appointment on November 18, 2019 @ 9:00am with  for Annual Wellness Visit and med check. Completing task.

## 2021-06-03 NOTE — PATIENT INSTRUCTIONS - HE
He will be contacted with results of your lab test    Your next colonoscopy is due in 2022.    Continue to see your neurologist    You have been referred to ophthalmology for eye exam.    He also had a flu shot today.

## 2021-06-03 NOTE — TELEPHONE ENCOUNTER
Called and spoke with Nino Stephen , Message was given, Nino Stephen  understood, no further questions.

## 2021-06-03 NOTE — TELEPHONE ENCOUNTER
----- Message from Marlo Valdez MD sent at 11/21/2019  6:00 PM CST -----  Please contact this patient, let him know that the levels look good on his but the Depakote level of the cholesterol liver kidney lites blood sugar hemoglobin white count and prostate.    The triglycerides were little high so try to decrease the carbohydrates in the diet.    Try to stay active as well which will help with the HDL, to increase the good cholesterol the HDL which was a little low.    No change in medicines.    Recheck in 6 months

## 2021-06-03 NOTE — TELEPHONE ENCOUNTER
RN cannot approve Refill Request    RN can NOT refill this medication med is not covered by policy/route to provider. Last office visit: 7/31/2019 Marlo Valdez MD Last Physical: Visit date not found Last MTM visit: Visit date not found Last visit same specialty: 7/31/2019 Marlo Valdez MD.  Next visit within 3 mo: Visit date not found  Next physical within 3 mo: Visit date not found      Claudette Crabtree, [facility] Triage/Med Refill 11/13/2019    Requested Prescriptions   Pending Prescriptions Disp Refills     naproxen (NAPROSYN) 500 MG tablet [Pharmacy Med Name: NAPROXEN 500 MG TABLET] 180 tablet 0     Sig: TAKE 1 TABLET BY MOUTH TWICE A DAY       There is no refill protocol information for this order

## 2021-06-03 NOTE — TELEPHONE ENCOUNTER
Orders being requested: PT home care 1 x/wk for 1 wk, 2 x/wk for 2 wks, then 1 x/wk for 1 wk.  Gait training, strengthening, and balance  Reason service is needed/diagnosis: Recent hospitalization for fall with fracture of multiple ribs on right side  When are orders needed by: asap  Where to send Orders: Phone:  Augusto at [phone]  Okay to leave detailed message?  Yes

## 2021-06-03 NOTE — TELEPHONE ENCOUNTER
Orders being requested: Skilled Nursing   -Three Times a week for Two Weeks   - Two Times a week for Two Weeks   -One Time a week for Two Weeks   - Two As needed visits .  Reason service is needed/diagnosis: Fracture Rib   When are orders needed by: As soon as possible .  Where to send Orders: Phone:  [phone]  Okay to leave detailed message?  Yes            Orders being requested: Physical Therapy   -One visit with in next five days .  Reason service is needed/diagnosis: Fracture Rib  When are orders needed by: As soon as possible .  Where to send Orders: Phone:  [phone]  Okay to leave detailed message?  Yes              Orders being requested: Occupational Therapy   - One visit with in next five days   Reason service is needed/diagnosis: Fracture Rib  When are orders needed by: As soon as possible .  Where to send Orders: Phone:   [phone]  Okay to leave detailed message?  Yes

## 2021-06-03 NOTE — TELEPHONE ENCOUNTER
Called and left detailed message giving verbal orders. Nothing further required, closing encounter.

## 2021-06-03 NOTE — PROGRESS NOTES
Subjective: This patient comes in for annual wellness visit.  This is a 59-year-old male.    He needed a flu shot this was given today.    Patient did have a recent fall last month was in the hospital discharge from [facility] 11/1/2019.  He had right lower rib fractures with pneumohemothorax.    Chest tube is out he is breathing fine.    He is finished now with therapy.    Patient had labs today with CMP lipid Depakote CBC and PSA.    He does have some nocturia x2 he does have 1+ BPH.    He is off tamsulosin no does not feel like he wants to go back on it    He is on Wellbutrin Depakote and venlafaxine his depression seems to be under control    He is now off the oxycodone and uses naproxen as needed for chest wall pain.    He sees other physicians neurology, Dr. Butts.  I did refer him to ophthalmology he has not seen an ophthalmologist for a while.    Patient did get a PSA level today and a rectal exam showed 1+ BPH    Patient is due for colonoscopy again in 2022, had a normal colonoscopy 2012, just a hyperplastic polyp.    No additional concerns or issues at this time.    He has an underlying aneurysm that ruptured has the right hemiparesis as before.    I did review health risk assessment on the flow sheet he needs a smoke detector otherwise doing well.    Positive for the fall as outlined.,  Mini cog 3 out of 5 he was not able to draw the clock.    Tobacco status: He  reports that he has quit smoking. His smoking use included cigarettes. He has never used smokeless tobacco.    Patient Active Problem List    Diagnosis Date Noted     Inguinal Hernia      Aneurysm Of The Basilar Tip      Major Depression, Single Episode      Chronic Cutaneous Ulcer Venous Stasis      Contusion Of The Right Chest Wall With Intact Skin Surface      Hyperlipidemia      Hemiparesis      Nicotine Dependence        Current Outpatient Medications   Medication Sig Dispense Refill     buPROPion (WELLBUTRIN XL) 150 MG 24 hr tablet  Take 1 tablet (150 mg total) by mouth every morning. 90 tablet 2     divalproex (DEPAKOTE DR) 500 MG 12 hour tablet TAKE 2 TABLETS BY MOUTH TWICE A  tablet 1     methylphenidate (RITALIN) 10 MG tablet Take 5-10 mg by mouth 2 (two) times a day.        naproxen (NAPROSYN) 500 MG tablet TAKE 1 TABLET BY MOUTH TWICE A  tablet 0     oxyCODONE-acetaminophen (PERCOCET/ENDOCET) 5-325 mg per tablet Take 1 tablet by mouth every 6 (six) hours as needed for pain. Uses for wrist pain as needed 40 tablet 0     sildenafil (VIAGRA) 100 MG tablet TAKE 1 TABLET BY MOUTH PRIOR TO SEXUAL ACTIVITY AS NEEDED. 6 tablet 1     venlafaxine (EFFEXOR-XR) 75 MG 24 hr capsule Take 3 capsules (225 mg total) by mouth daily. 270 capsule 2     zolpidem (AMBIEN) 5 MG tablet Take 1 tablet (5 mg total) by mouth at bedtime as needed for sleep. 30 tablet 2     baclofen (LIORESAL) 10 MG tablet Take 1 tablet (10 mg total) by mouth 2 (two) times a day.  0     No current facility-administered medications for this visit.        ROS: 10 point review of systems positive as outlined above otherwise negative    Objective:    BP 98/72 (Patient Site: Left Arm, Patient Position: Sitting, Cuff Size: Adult Regular)   Pulse 72   Temp 97.4  F (36.3  C) (Oral)   Resp 16   Ht 6' (1.829 m)   Wt 191 lb (86.6 kg)   SpO2 97%   BMI 25.90 kg/m    Body mass index is 25.9 kg/m .    General appearance no acute distress.    HEENT neck negative oropharynx clear pupils react normally extract movements    He has a brace on the right leg as before he has right hemiparesis above upper and lower.    Neck was supple    Does have some pain in through the right lower ribs where the fracture.  Good air exchange breath sounds are clear    Lungs otherwise normal    Heart was regular S1-S2 rate in the 70s O2 sat 97 skin was normal no rashes.  Neck without thyroid fullness or bruit.    Lower extremities without edema.    Rectal was done prostate 1+ enlarged symmetric  smooth    Testes descended normally no evidence of hernia    Labs CMP lipid Depakote CBC and PSA pending        Assessment:  1. Medicare annual wellness visit, subsequent  Influenza, Recombinant, Inj, Quadrivalent, PF, 18+YRS    Ambulatory referral to Ophthalmology   2. Aneurysm Of The Basilar Tip     3. Major depressive disorder with single episode, remission status unspecified  Valproic Acid (Depakene )   4. Hemiplegia, unspecified etiology, unspecified hemiplegia type, unspecified laterality (H)  baclofen (LIORESAL) 10 MG tablet   5. Hyperlipidemia, unspecified hyperlipidemia type  Lipid [place] FASTING    Comprehensive Metabolic Panel   6. Benign prostatic hyperplasia without lower urinary tract symptoms     7. Closed fracture of multiple ribs of right side, sequela     8. Pneumothorax, unspecified type  HM2(CBC w/o Differential)   9. Nocturia  PSA (Prostatic-Specific Antigen), Diagnostic     Medicare annual wellness visit stable    Recent fall with hemopneumothorax and rib fractures status post chest tube, back to baseline.    Nocturia with 1+ BPH continue to monitor he does not want to take tamsulosin at this time    Major depression controlled on present medications    Patient continues also to see neurology, Dr. Butts he is on methylphenidate and baclofen from neurology.    Hyperlipidemia await results, just on diet    He does have ED and takes sildenafil as needed.    Plan: As outlined above, referral to ophthalmology.    Patient be contacted with results and follow-up    Flu shot given    This transcription uses voice recognition software, which may contain typographical errors.

## 2021-06-04 NOTE — TELEPHONE ENCOUNTER
Controlled Substance Refill Request  Medication Name:   Requested Prescriptions     Pending Prescriptions Disp Refills     oxyCODONE-acetaminophen (PERCOCET/ENDOCET) 5-325 mg per tablet 40 tablet 0     Sig: Take 1 tablet by mouth every 6 (six) hours as needed for pain. Uses for wrist pain as needed   Date Last Fill: 10/25/19  Pharm[facility]: [id number]      Submit electronically to pharmacy  Controlled Substance Agreement Date Scanned:   Encounter-Level CSA Scan Date:    There are no encounter-level csa scan date.       Last office visit with prescriber/PCP: 7/31/2019 Marlo Valdez MD OR same dept: 7/31/2019 Marlo Valdez MD OR same specialty: 7/31/2019 Marlo Valdez MD  Last physical: 11/18/2019 Last MTM visit: Visit date not found

## 2021-06-04 NOTE — TELEPHONE ENCOUNTER
Patient Returning Call  Reason for call:  Patient is calling back on status of request  Patient states is out of the medication  Information relayed to patient:  Informed patient request is open for PCP's review. Will request consideration ASAP tomas  Patient has additional questions:  No  If YES, what are your questions/concerns:  NA  Okay to leave a detailed message?: No call back needed

## 2021-06-05 VITALS
HEIGHT: 72 IN | HEART RATE: 97 BPM | DIASTOLIC BLOOD PRESSURE: 75 MMHG | TEMPERATURE: 98 F | OXYGEN SATURATION: 96 % | BODY MASS INDEX: 25.22 KG/M2 | RESPIRATION RATE: 18 BRPM | SYSTOLIC BLOOD PRESSURE: 119 MMHG | WEIGHT: 186.2 LBS

## 2021-06-05 VITALS
BODY MASS INDEX: 25.14 KG/M2 | SYSTOLIC BLOOD PRESSURE: 122 MMHG | RESPIRATION RATE: 16 BRPM | DIASTOLIC BLOOD PRESSURE: 86 MMHG | HEART RATE: 90 BPM | TEMPERATURE: 98.4 F | OXYGEN SATURATION: 92 % | WEIGHT: 185.6 LBS | HEIGHT: 72 IN

## 2021-06-05 VITALS
BODY MASS INDEX: 25.22 KG/M2 | DIASTOLIC BLOOD PRESSURE: 77 MMHG | HEART RATE: 81 BPM | SYSTOLIC BLOOD PRESSURE: 121 MMHG | TEMPERATURE: 98.5 F | HEIGHT: 72 IN | RESPIRATION RATE: 16 BRPM | WEIGHT: 186.2 LBS | OXYGEN SATURATION: 93 %

## 2021-06-05 VITALS
OXYGEN SATURATION: 95 % | BODY MASS INDEX: 24.98 KG/M2 | HEART RATE: 98 BPM | HEIGHT: 72 IN | DIASTOLIC BLOOD PRESSURE: 76 MMHG | SYSTOLIC BLOOD PRESSURE: 121 MMHG | RESPIRATION RATE: 14 BRPM | TEMPERATURE: 98.6 F

## 2021-06-05 VITALS
HEART RATE: 75 BPM | BODY MASS INDEX: 24.49 KG/M2 | RESPIRATION RATE: 17 BRPM | OXYGEN SATURATION: 94 % | SYSTOLIC BLOOD PRESSURE: 121 MMHG | DIASTOLIC BLOOD PRESSURE: 61 MMHG | HEIGHT: 72 IN | WEIGHT: 180.8 LBS | TEMPERATURE: 98.4 F

## 2021-06-05 VITALS
OXYGEN SATURATION: 95 % | HEART RATE: 90 BPM | DIASTOLIC BLOOD PRESSURE: 82 MMHG | TEMPERATURE: 98.2 F | RESPIRATION RATE: 16 BRPM | WEIGHT: 179.2 LBS | HEIGHT: 72 IN | SYSTOLIC BLOOD PRESSURE: 146 MMHG | BODY MASS INDEX: 24.27 KG/M2

## 2021-06-05 VITALS
HEIGHT: 72 IN | BODY MASS INDEX: 24.27 KG/M2 | RESPIRATION RATE: 18 BRPM | SYSTOLIC BLOOD PRESSURE: 134 MMHG | HEART RATE: 87 BPM | DIASTOLIC BLOOD PRESSURE: 89 MMHG | TEMPERATURE: 97.5 F | OXYGEN SATURATION: 96 % | WEIGHT: 179.2 LBS

## 2021-06-05 VITALS
HEIGHT: 72 IN | BODY MASS INDEX: 24.98 KG/M2 | SYSTOLIC BLOOD PRESSURE: 110 MMHG | DIASTOLIC BLOOD PRESSURE: 62 MMHG | WEIGHT: 184.4 LBS | TEMPERATURE: 98.4 F | RESPIRATION RATE: 16 BRPM | HEART RATE: 78 BPM | OXYGEN SATURATION: 94 %

## 2021-06-05 NOTE — TELEPHONE ENCOUNTER
Controlled Substance Refill Request  Medication Name:   Requested Prescriptions     Pending Prescriptions Disp Refills     oxyCODONE-acetaminophen (PERCOCET/ENDOCET) 5-325 mg per tablet 40 tablet 0     Sig: Take 1 tablet by mouth every 6 (six) hours as needed for pain. Uses for wrist pain as needed     Date Last Fill: 12/16/19  Is patient out of medication?: No, One days left  Patient notified refills processed within 3 business days:  Yes  [facility]  Submit electronically to pharmacy  Controlled Substance Agreement on file:   Encounter-Level CSA Scan Date:    There are no encounter-level csa scan date.        Last office visit:  11/18/19

## 2021-06-05 NOTE — TELEPHONE ENCOUNTER
Controlled Substance Refill Request  Medication Name:   Requested Prescriptions     Pending Prescriptions Disp Refills     zolpidem (AMBIEN) 5 MG tablet 30 tablet 2     Sig: Take 1 tablet (5 mg total) by mouth at bedtime as needed for sleep.     Date Last Fill: 12/18/2019  [facility]  Submit electronically to pharmacy  Controlled Substance Agreement on file:   Encounter-Level CSA Scan Date:    There are no encounter-level csa scan date.        Last office visit:  11/18/2019

## 2021-06-05 NOTE — TELEPHONE ENCOUNTER
Refill Approved    Rx renewed per Medication Renewal Policy. Medication was last renewed on 3/28/19.    Arlene Casas, [facility] Triage/Med Refill 1/20/2020     Requested Prescriptions   Pending Prescriptions Disp Refills     buPROPion (WELLBUTRIN XL) 150 MG 24 hr tablet [Pharmacy Med Name: BUPROPION HCL  MG TABLET] 90 tablet 2     Sig: TAKE 1 TABLET BY MOUTH EVERY DAY IN THE MORNING       Tricyclics/Misc Antidepressant/Antianxiety Meds Refill Protocol Passed - 1/19/2020 12:49 PM        Passed - PCP or prescribing provider visit in last year     Last office visit with prescriber/PCP: 7/31/2019 Marlo Valdez MD OR same dept: 7/31/2019 Marlo Valdez MD OR same specialty: 7/31/2019 Marlo Valdez MD  Last physical: 11/18/2019 Last MTM visit: Visit date not found   Next visit within 3 mo: Visit date not found  Next physical within 3 mo: Visit date not found  Prescriber OR PCP: Marlo Valdez MD  Last diagnosis associated with med order: 1. Major depressive disorder, single episode  - buPROPion (WELLBUTRIN XL) 150 MG 24 hr tablet [Pharmacy Med Name: BUPROPION HCL  MG TABLET]; TAKE 1 TABLET BY MOUTH EVERY DAY IN THE MORNING  Dispense: 90 tablet; Refill: 2    If protocol passes may refill for 12 months if within 3 months of last provider visit (or a total of 15 months).

## 2021-06-05 NOTE — TELEPHONE ENCOUNTER
Patient Returning Call  Reason for call:  Return call   Information relayed to patient:  Caller stated that he was returning a call and was not sure why or who called him. ( Writer was not sure to way he received a call as well, did inform patient that medication was sent over today. )   Patient has additional questions:  No  If YES, what are your questions/concerns:  N/a  Okay to leave a detailed message?: No call back needed

## 2021-06-06 ENCOUNTER — RECORDS - HEALTHEAST (OUTPATIENT)
Dept: ADMINISTRATIVE | Facility: OTHER | Age: 61
End: 2021-06-06

## 2021-06-06 NOTE — TELEPHONE ENCOUNTER
Refill Approved    Rx renewed per Medication Renewal Policy. Medication was last renewed on 9/18/19.    Arlene Casas, [facility] Triage/Med Refill 3/12/2020     Requested Prescriptions   Pending Prescriptions Disp Refills     divalproex (DEPAKOTE DR) 500 MG 12 hour tablet [Pharmacy Med Name: DIVALPROEX SOD  MG TAB] 360 tablet 1     Sig: TAKE 2 TABLETS BY MOUTH TWICE A DAY       Divalproex/Valproic Acid Refill Protocol Passed - 3/10/2020  2:22 AM        Passed - LFT or AST or ALT in last 12 months     Albumin   Date Value Ref Range Status   11/18/2019 3.5 3.5 - 5.0 g/dL Final     Bilirubin, Total   Date Value Ref Range Status   11/18/2019 0.4 0.0 - 1.0 mg/dL Final     Bilirubin, Direct   Date Value Ref Range Status   01/30/2017 0.1 <=0.5 mg/dL Final     Alkaline Phosphatase   Date Value Ref Range Status   11/18/2019 121 (H) 45 - 120 U/L Final     AST   Date Value Ref Range Status   11/18/2019 11 0 - 40 U/L Final     ALT   Date Value Ref Range Status   11/18/2019 10 0 - 45 U/L Final     Protein, Total   Date Value Ref Range Status   11/18/2019 6.6 6.0 - 8.0 g/dL Final                Passed - CBC w/ plts (Hm2) in last 12 months      WBC   Date Value Ref Range Status   11/18/2019 6.3 4.0 - 11.0 thou/uL Final     Hemoglobin   Date Value Ref Range Status   11/18/2019 14.0 14.0 - 18.0 g/dL Final     Hematocrit   Date Value Ref Range Status   11/18/2019 41.2 40.0 - 54.0 % Final     Platelets   Date Value Ref Range Status   11/18/2019 285 140 - 440 thou/uL Final             Passed - PCP or prescribing provider visit in last 12 months       Last office visit with prescriber/PCP: 7/31/2019 Marlo Valdez MD OR same dept: 7/31/2019 Marlo Valdez MD OR same specialty: 7/31/2019 Marlo Valdez MD  Last physical: 11/18/2019 Last MTM visit: Visit date not found   Next visit within 3 mo: Visit date not found  Next physical within 3 mo: Visit date not found  Prescriber OR PCP: Marlo Valdez MD  Last diagnosis  associated with med order: 1. Hemiparesis  - divalproex (DEPAKOTE DR) 500 MG 12 hour tablet [Pharmacy Med Name: DIVALPROEX SOD  MG TAB]; TAKE 2 TABLETS BY MOUTH TWICE A DAY  Dispense: 360 tablet; Refill: 1    2. Aneurysm Of The Basilar Tip  - divalproex (DEPAKOTE DR) 500 MG 12 hour tablet [Pharmacy Med Name: DIVALPROEX SOD  MG TAB]; TAKE 2 TABLETS BY MOUTH TWICE A DAY  Dispense: 360 tablet; Refill: 1    If protocol passes may refill for 12 months if within 3 months of last provider visit (or a total of 15 months).

## 2021-06-06 NOTE — TELEPHONE ENCOUNTER
Controlled Substance Refill Request  Medication Name:   Requested Prescriptions     Pending Prescriptions Disp Refills     oxyCODONE-acetaminophen (PERCOCET/ENDOCET) 5-325 mg per tablet 40 tablet 0     Sig: Take 1 tablet by mouth every 6 (six) hours as needed for pain. Uses for wrist pain as needed     Date Last Fill: 02/10/20  Is patient out of medication?:  Yes  Patient notified refills processed within 3 business days:  Yes  [facility]  Submit electronically to pharmacy   Controlled Substance Agreement on file:   Encounter-Level CSA Scan Date:    There are no encounter-level csa scan date.        Last office visit:  11/18/19

## 2021-06-07 NOTE — TELEPHONE ENCOUNTER
Patient Returning Call  Reason for call:  Patient calling back  Information relayed to patient:  Relayed below message from the provider. Patient agrees.  Patient has additional questions:  No  If YES, what are your questions/concerns:  Transferred patient to scheduling to make an appointment.  Okay to leave a detailed message?: No call back needed

## 2021-06-07 NOTE — TELEPHONE ENCOUNTER
Controlled Substance Refill Request  Medication Name:   Requested Prescriptions     Pending Prescriptions Disp Refills     oxyCODONE-acetaminophen (PERCOCET/ENDOCET) 5-325 mg per tablet 40 tablet 0     Sig: Take 1 tablet by mouth every 6 (six) hours as needed for pain. Uses for wrist pain as needed     Date Last Fill: 03/05/2020  Requested Pharmacy: [id number] - [place], [place] - [address]   Submit electronically to pharmacy  Controlled Substance Agreement on file:   Encounter-Level CSA Scan Date:    There are no encounter-level csa scan date.        Last office visit:  11/18/2020

## 2021-06-07 NOTE — PROGRESS NOTES
"Nino Stephen is a 59 y.o. male who is being evaluated via a billable telephone visit.      The patient has been notified of following:     \"This telephone visit will be conducted via a call between you and your physician/provider. We have found that certain health care needs can be provided without the need for a physical exam.  This service lets us provide the care you need with a short phone conversation.  If a prescription is necessary we can send it directly to your pharmacy.  If lab work is needed we can place an order for that and you can then stop by our lab to have the test done at a later time.    Telephone visits are billed at different rates depending on your insurance coverage. During this emergency period, for some insurers they may be billed the same as an in-person visit.  Please reach out to your insurance provider with any questions.    If during the course of the call the physician/provider feels a telephone visit is not appropriate, you will not be charged for this service.\"    Patient has given verbal consent to a Telephone visit? Yes    Additional provider notes:    This was a telephone visit because of the coronavirus pandemic.    This patient follows up on his medications.    He has had a previous aneurysm rupture and has right-sided hemiplegia.  Has been unchanged.    He does see neurology, Dr. Butts and continues on baclofen and methylphenidate.  Those have been helpful.    For his depression he is on venlafaxine Depakote and bupropion.  He is not changed any dosages and is been doing well.  He also takes zolpidem at bedtime    He was to get a evaluation from ophthalmology he is due for that and I did give him another referral.  He was seen on 11/18/2019 for annual wellness visit.    At that time lipid showed total cholesterol 125 triglyceride 163 HDL 34 LDL 58.  Encouraged him to try to stay active.    He had therapeutic Depakote level also CBC normal CMP normal and PSA was " "4.0.    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  He last saw Dr. Hardeep Brandt at [facility] in 2017.  Patient's been requiring increased pain medication for the wrist and I would like him to go back and see the hand surgeon again.  He takes oxycodone/acetaminophen, averaging about 1 a day although he is used closer to 40/month in the last couple weeks.    I discussed he needs to follow-up in 2 months and see me and discuss ongoing pain control, will discuss urine drug screen as well as opioid use contract etc. Narcan etc.    Hopefully he will use less as I am going to have him see the hand surgeon and they may inject that her to recommend surgery.    The left wrist and hand is his \"good hand \"and he uses that exclusively.    No additional concern or issue no change in meds    Is not had any COVID-19 symptoms no cough congestion sore throat or shortness of breath    Assessment/Plan:  1. Aneurysm Of The Basilar Tip     2. Major depressive disorder with single episode, remission status unspecified     3. Hemiplegia, unspecified etiology, unspecified hemiplegia type, unspecified laterality (H)      right side   4. Benign prostatic hyperplasia without lower urinary tract symptoms     5. Health care maintenance  Ambulatory referral to Ophthalmology   6. Wrist pain, chronic, left  Ambulatory referral to Orthopedic Surgery     Status post aneurysm rupture follows with neurology on a regular basis further spasticity hemiplegia    Major depression controlled    History of BPH was PSA at 4.0 not on medication at this time is doing okay.    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  Pain medication discussion and referral to orthopedic hand " surgery        Phone call duration:14 minutes, 10:58 AM through 11:12 AM    Marlo Valdez MD

## 2021-06-08 ENCOUNTER — RECORDS - HEALTHEAST (OUTPATIENT)
Dept: ADMINISTRATIVE | Facility: OTHER | Age: 61
End: 2021-06-08

## 2021-06-08 NOTE — PROGRESS NOTES
Subjective: This patient comes in for follow-up.  Not seen since April of last year    He's had a previous aneurysm rupture with right-sided hemiparesis no real changes there.    He has depression and is on appropriate as well as venlafaxine he's been stable regarding management.    Patient sees neurology, Dr. 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 Sanchez from orthopedic hand.  They had him use an orthotic in the past they did discuss reinjecting the wrist I offer that for him but he wants to hold off for now.    He has erectile dysfunction uses Cialis as needed he's had 20 mg tablets in the past and is used either half or a quarter tablet as needed.  I gave him some samples of 20 mg tablets today, #12 tablets given.    Otherwise no new or acute problems.  He did need refills on some medications and this was done    For his chronic pain he continues on Percocet he's had intermittent use of this last refilled in October, 40 tablets, 4 tablets again refilled today    Tobacco status: He  reports that he has quit smoking. His smoking use included Cigarettes. He has never used smokeless tobacco.    Patient Active Problem List    Diagnosis Date Noted     Inguinal Hernia      Aneurysm Of The Basilar Tip      Major Depression, Single Episode      Chronic Cutaneous Ulcer Venous Stasis      Contusion Of The Right Chest Wall With Intact Skin Surface      Hyperlipidemia      Hemiparesis      Nicotine Dependence        Current Outpatient Prescriptions   Medication Sig Dispense Refill     buPROPion (WELLBUTRIN XL) 150 MG 24 hr tablet TAKE 3 TABLETS (450 MG TOTAL) BY MOUTH EVERY MORNING. 270 tablet 0     divalproex (DEPAKOTE) 500 MG EC tablet TAKE 2 TABLETS (1,000 MG TOTAL) BY MOUTH 2 (TWO) TIMES A DAY. 180 tablet 0      methylphenidate (RITALIN) 10 MG tablet Take 5-10 mg by mouth 2 (two) times a day.        naproxen (NAPROSYN) 500 MG tablet TAKE 1 TABLET BY MOUTH TWICE A DAY 60 tablet 6     sildenafil (VIAGRA) 100 MG tablet Take 1 tablet (100 mg total) by mouth daily as needed for erectile dysfunction. 6 tablet 3     tadalafil (CIALIS) 5 MG tablet TAKE 1 TABLET BY MOUTH ONCE DAILY 30 tablet 1     venlafaxine (EFFEXOR-XR) 75 MG 24 hr capsule TAKE 2 CAPSULES (150 MG TOTAL) BY MOUTH DAILY. 180 capsule 0     oxyCODONE-acetaminophen (PERCOCET) 5-325 mg per tablet Take 1 tablet by mouth every 6 (six) hours as needed for pain. Uses for wrist pain as needed 40 tablet 0     zolpidem (AMBIEN) 5 MG tablet TAKE 1 TABLET BY MOUTH AT BEDTIME AS NEEDED FOR SLEEP 30 tablet 2     No current facility-administered medications for this visit.        ROS:  10 point review of systems negative other than as outlined above    Objective:    Visit Vitals     /80 (Patient Site: Left Arm, Patient Position: Sitting, Cuff Size: Adult Large)     Pulse 80     Resp 16     Wt 191 lb (86.6 kg)     BMI 25.9 kg/m2     Body mass index is 25.9 kg/(m^2).      General appearance no acute distress, in a good mood  Vital signs are stable  HEENT: Oropharynx clear pupils react normally    Neck negative no adenopathy no bruits    Lung are clear, no rales or rhonchi heart regular S1-S2    His left wrist at the snuffbox had tenderness to palpation no swelling.    Right hemiparesis as before    Abdomen nontender    Extremities without edema he does have a AFO for the right foot for the foot drop.    Labs lipid BMP LFT Depakote pending            Assessment:  1. Hemiparesis  Valproic Acid (Depakene )    Basic Metabolic Panel    Basic Metabolic Panel   2. Major Depression, Single Episode  zolpidem (AMBIEN) 5 MG tablet   3. Pain  oxyCODONE-acetaminophen (PERCOCET) 5-325 mg per tablet   4. Aneurysm Of The Basilar Tip  Valproic Acid (Depakene )    Basic Metabolic Panel    Basic  Metabolic Panel   5. Wrist pain, chronic, left     6. Hyperlipidemia  Hepatic Profile    Lipid [place]   7. ED (erectile dysfunction)        Hemiparesis secondary to the basilar tip aneurysm rupture years ago stable    Depression with insomnia refill on medications    Chronic pain issues refill on Percocet No. 40 tablets    Rectal dysfunction, Cialis tablets samples given.    Hyperlipidemia history on diet alone    Scapholunate collapse syndrome    Plan:  As outlined above all contact him regarding the results    This transcription uses voice recognition software, which may contain typographical errors.

## 2021-06-08 NOTE — TELEPHONE ENCOUNTER
Controlled Substance Refill Request  Medication Name:   Requested Prescriptions     Pending Prescriptions Disp Refills     oxyCODONE-acetaminophen (PERCOCET/ENDOCET) 5-325 mg per tablet 40 tablet 0     Sig: Take 1 tablet by mouth every 6 (six) hours as needed for pain. Uses for wrist pain as needed     Date Last Fill: 4/8/20  Is patient out of medication?:  Yes  Patient notified refills processed within 3 business days:  Yes  [facility]  [facility] electronically to [facility]  Controlled Substance Agreement on file:   Encounter-Level CSA Scan Date:    There are no encounter-level csa scan date.        Last office visit:  4/23/20

## 2021-06-09 NOTE — TELEPHONE ENCOUNTER
Controlled Substance Refill Request  Medication Name:   Requested Prescriptions     Pending Prescriptions Disp Refills     oxyCODONE-acetaminophen (PERCOCET/ENDOCET) 5-325 mg per tablet 40 tablet 0     Sig: Take 1 tablet by mouth every 6 (six) hours as needed for pain. Uses for wrist pain as needed     zolpidem (AMBIEN) 5 MG tablet 30 tablet 2     Sig: Take 1 tablet (5 mg total) by mouth at bedtime as needed for sleep.     Date Last Fill: 6/19/2020  Requested [facility]acy: [id number]  Submit electronically to pharmacy  Controlled Substance Agreement on file:   Encounter-Level CSA Scan Date:    There are no encounter-level csa scan date.        Last office visit:  4/23/2020

## 2021-06-09 NOTE — TELEPHONE ENCOUNTER
"Called and was sent to voicemail. Voicemail is full unable to leave message.     \"Okay to relay message\"     "

## 2021-06-09 NOTE — TELEPHONE ENCOUNTER
Please contact this patient, let him know that I did refill his Percocet, but I want to make sure that he has seen the orthopedic surgeon about his wrist pain.  I do not see any correspondence from the orthopedist.    If he has not seen the orthopedist, please get him scheduled

## 2021-06-09 NOTE — TELEPHONE ENCOUNTER
Called and spoke with Nino Stephen, Message was given, Nino Stephen understood, no further questions. Patient already has appt to see orthopedic surgeon.    Patient would like to see ophthalmologist, referral was put in April. Specialty schedulers, are you able to assist with scheduling patient for this?

## 2021-06-09 NOTE — TELEPHONE ENCOUNTER
Patient Returning Call    Reason for call:  LMTCB    Information relayed to patient:    Patient informed of message and verbalize an understanding.    Patient has additional questions:  Yes     If YES, what are your questions/concerns:    In response to PCP question:  Has he seen the orthopedic surgeon yet? No!  CMA transfer to [place] to schedule office visit with Ortho per PCP request.    Okay to leave a detailed message?: No call back needed

## 2021-06-09 NOTE — TELEPHONE ENCOUNTER
Please contact this patient, let him know that I did refill the medication but he needs to see the orthopedic surgeon.  I put in a referral at his last visit in April    Has he seen the orthopedic surgeon yet?

## 2021-06-09 NOTE — TELEPHONE ENCOUNTER
Controlled Substance Refill Request  Medication Name:   Requested Prescriptions     Pending Prescriptions Disp Refills     oxyCODONE-acetaminophen (PERCOCET/ENDOCET) 5-325 mg per tablet 40 tablet 0     Sig: Take 1 tablet by mouth every 6 (six) hours as needed for pain. Uses for wrist pain as needed     zolpidem (AMBIEN) 5 MG tablet 30 tablet 2     Sig: Take 1 tablet (5 mg total) by mouth at bedtime as needed for sleep.     Date Last Fill:   5/12/2020  1/15/2020  Respectively    [facility]ed [facility]: [id number]  Submit electronically to pharmacy  Controlled Substance Agreement on file:   Encounter-Level CSA Scan Date:    There are no encounter-level csa scan date.        Last office visit:  4/23/2020    Patient is out of both above medications.

## 2021-06-10 NOTE — PROGRESS NOTES
Subjective: This patient comes in for evaluation is a 56-year-old male.  He has had some increased urinary symptoms sometimes trouble with a little incontinence sometimes trouble starting he goes once at night    Decreased stream.    I did check a rectal check he has an enlarged prostate I did not palpate any nodules    His PSA is 4.4 normal up to 3.5.  I did start him on tamsulosin.  I will have him see the urologist regarding these symptoms as well as elevated PSA    Patient has some chronic left wrist pain he has seen the hand surgeon in the past he takes occasional Percocet and I did refill 40 tablets.  Last refill was in January.    Patient has venous stasis with edema he does have right hemiplegia.    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    Patient has had some increased problems with depression he has had some trouble communicating with his daughter.  She has had some issues herself he is on bupropion 150 mg 3 a day venlafaxine 75 mg 2 a day I did increase that to 3 a day.  If not improved with this he said he would go to counseling I also have him see a psychiatrist.    Tobacco status: He  reports that he has quit smoking. His smoking use included Cigarettes. He has never used smokeless tobacco.    Patient Active Problem List    Diagnosis Date Noted     Inguinal Hernia      Aneurysm Of The Basilar Tip      Major Depression, Single Episode      Chronic Cutaneous Ulcer Venous Stasis      Contusion Of The Right Chest Wall With Intact Skin Surface      Hyperlipidemia      Hemiparesis      Nicotine Dependence        Current Outpatient Prescriptions   Medication Sig Dispense Refill     buPROPion (WELLBUTRIN XL) 150 MG 24 hr tablet TAKE 3 TABLETS (450 MG TOTAL) BY MOUTH EVERY MORNING. 270 tablet 3     divalproex (DEPAKOTE) 500 MG EC tablet TAKE 2 TABLETS (1,000 MG TOTAL) BY MOUTH 2 (TWO) TIMES A DAY. 180 tablet 0     methylphenidate (RITALIN) 10 MG tablet Take 5-10 mg by mouth 2 (two) times a day.         "oxyCODONE-acetaminophen (PERCOCET) 5-325 mg per tablet Take 1 tablet by mouth every 6 (six) hours as needed for pain. Uses for wrist pain as needed 40 tablet 0     sildenafil (VIAGRA) 100 MG tablet Take 1 tablet (100 mg total) by mouth daily as needed for erectile dysfunction. 6 tablet 3     tadalafil (CIALIS) 5 MG tablet TAKE 1 TABLET BY MOUTH ONCE DAILY 30 tablet 1     venlafaxine (EFFEXOR-XR) 75 MG 24 hr capsule TAKE 3 CAPSULES (225 MG TOTAL) BY MOUTH DAILY. 270 capsule 1     zolpidem (AMBIEN) 5 MG tablet TAKE 1 TABLET BY MOUTH AT BEDTIME AS NEEDED FOR SLEEP 30 tablet 2     naproxen (NAPROSYN) 500 MG tablet TAKE 1 TABLET BY MOUTH TWICE A DAY 60 tablet 2     tamsulosin (FLOMAX) 0.4 mg Cp24 Take 1 capsule (0.4 mg total) by mouth daily. 30 capsule 3     No current facility-administered medications for this visit.        ROS:   10 point review of systems positive as outlined otherwise negative    Objective:    /72 (Patient Site: Left Arm, Patient Position: Sitting, Cuff Size: Adult Large)  Pulse 64  Resp 14  Ht 6' 0.5\" (1.842 m)  Wt 199 lb (90.3 kg)  BMI 26.62 kg/m2  Body mass index is 26.62 kg/(m^2).  The following are part of a depression follow up plan for the patient:  under care of mental health team    General appearance somewhat down    Vital signs are stable    Left wrist with some tenderness dorsally.    No significant swelling    Patient has right hemiplegia    He does have some mild edema.  We discussed elevation.    Lungs were clear    Heart was regular S1-S2 rate in the 60s    Abdomen without mass    Rectal was done enlarged prostate bilateral lobes PSA elevated at 4.4.    Skin was normal    Results for orders placed or performed in visit on 05/22/17   PSA, Diagnostic (Prostatic-Specific Antigen)   Result Value Ref Range    PSA 4.4 (H) 0.0 - 3.5 ng/mL       Assessment:  1. BPH (benign prostatic hyperplasia)  tamsulosin (FLOMAX) 0.4 mg Cp24    PSA, Diagnostic (Prostatic-Specific Antigen)   2. " Hemiparesis     3. Wrist pain, chronic, left  naproxen (NAPROSYN) 500 MG tablet    oxyCODONE-acetaminophen (PERCOCET) 5-325 mg per tablet   4. Major Depression, Single Episode  venlafaxine (EFFEXOR-XR) 75 MG 24 hr capsule     Elevated PSA with BPH start tamsulosin see urologist    Hemiparesis from previous aneurysm.    Left wrist pain he does have some naproxen he will use and if needed Percocet    Major depression increase venlafaxine stay on the same bupropion I offered counseling he did not want to go.    Plan: As outlined above    This transcription uses voice recognition software, which may contain typographical errors.

## 2021-06-10 NOTE — TELEPHONE ENCOUNTER
Patient Returning Call  Reason for call:  Returning phone call.  Information relayed to patient:  Below message relayed to patient. I transferred patient to [facility].  Patient has additional questions:  No  If YES, what are your questions/concerns:  No additional questions at this time.  Okay to leave a detailed message?: No call back needed

## 2021-06-10 NOTE — TELEPHONE ENCOUNTER
Patient was given a referral to orthopedist back in April.    Patient was to get further management and pain medicine from the orthopedist or proceed with surgery.    It does not look like the patient ever went to see the orthopedist

## 2021-06-10 NOTE — TELEPHONE ENCOUNTER
Controlled Substance Refill Request  Medication Name:   Requested Prescriptions     Pending Prescriptions Disp Refills     oxyCODONE-acetaminophen (PERCOCET/ENDOCET) 5-325 mg per tablet 40 tablet 0     Sig: Take 1 tablet by mouth every 6 (six) hours as needed for pain. Uses for wrist pain as needed     Date Last Fill: 7/20/2020  [id number]  Submit electronically to pharmacy  Controlled Substance Agreement on file:   Encounter-Level CSA Scan Date:    There are no encounter-level csa scan date.        Last office visit:  4/23/2020    FYI: Patient stated that he is out of his medication.

## 2021-06-11 NOTE — PROGRESS NOTES
"Nino Stephen is a 60 y.o. male who is being evaluated via a billable video visit.      The patient has been notified of following:     \"This video visit will be conducted via a call between you and your physician/provider. We have found that certain health care needs can be provided without the need for an in-person physical exam.  This service lets us provide the care you need with a video conversation.  If a prescription is necessary we can send it directly to your pharmacy.  If lab work is needed we can place an order for that and you can then stop by our lab to have the test done at a later time.    Video visits are billed at different rates depending on your insurance coverage. Please reach out to your insurance provider with any questions.    If during the course of the call the physician/provider feels a video visit is not appropriate, you will not be charged for this service.\"    Patient has given verbal consent to a Video visit? Yes  How would you like to obtain your AVS? AVS Preference: Mail a copy.  If dropped by the video visit, the video invitation should be sent to: Text to cell phone: [phone]   Will anyone else be joining your video visit? No        Video Start Time: 3:18 pm    Additional provider notes:       Subjective: This patient had a virtual visit, video, due to the coronavirus pandemic.    Patient has surgery scheduled for October 28 at [facility] with Dr. Pate from [facility].    Patient has been followed more recently by Dr. Cheng at [facility] and had an injection in his wrist which was unsuccessful.  He has significant wrist osteoarthritis and will need a fusion and scaphoid excision of the left wrist.    Patient's had previous right hemiplegia secondary to aneurysm rupture (basilar).  He is very limited with his right hand    Now that he is can have surgery on his left and will not be able to use either hand he is again the need to go to a " TCU.    I did put in a referral for  to help coordinate that.  I put in a referral to care management to see if we can help arrange so he has a TCU following his surgery on October 28.    Patient will have a video preop on 10/20/2020 with me.    In addition we discussed his right arm and hand and deformity and discussed Botox injection it might be helpful.  To [facility] I believe that is where he can get this done   I put in a referral.  Our  was contacted regarding this and will get this arranged ASAP.    Patient was seen along with his ex-wife Imani who does have authorization to communicate with us regarding regarding Nino's health.  Her number is [phone].    Patient denies any COVID-19 exposure or symptoms.    Tobacco status: He  reports that he has quit smoking. His smoking use included cigarettes. He has never used smokeless tobacco.    Patient Active Problem List    Diagnosis Date Noted     Inguinal Hernia      Aneurysm Of The Basilar Tip      Major Depression, Single Episode      Chronic Cutaneous Ulcer Venous Stasis      Contusion Of The Right Chest Wall With Intact Skin Surface      Hyperlipidemia      Hemiparesis      Nicotine Dependence        Current Outpatient Medications   Medication Sig Dispense Refill     buPROPion (WELLBUTRIN XL) 150 MG 24 hr tablet TAKE 1 TABLET BY MOUTH EVERY DAY IN THE MORNING 90 tablet 3     divalproex (DEPAKOTE DR) 500 MG 12 hour tablet TAKE 2 TABLETS BY MOUTH TWICE A  tablet 2     venlafaxine (EFFEXOR-XR) 75 MG 24 hr capsule TAKE 3 CAPSULES (225 MG TOTAL) BY MOUTH DAILY. 270 capsule 0     zolpidem (AMBIEN) 5 MG tablet Take 1 tablet (5 mg total) by mouth at bedtime as needed for sleep. 30 tablet 2     baclofen (LIORESAL) 10 MG tablet Take 1 tablet (10 mg total) by mouth 2 (two) times a day.  0     methylphenidate (RITALIN) 10 MG tablet Take 5-10 mg by mouth 2 (two) times a day.        naproxen (NAPROSYN) 500 MG tablet TAKE 1 TABLET BY MOUTH TWICE  A  tablet 0     oxyCODONE-acetaminophen (PERCOCET/ENDOCET) 5-325 mg per tablet Take 1 tablet by mouth every 6 (six) hours as needed for pain. Uses for wrist pain as needed 40 tablet 0     sildenafiL (VIAGRA) 100 MG tablet TAKE 1 TABLET BY MOUTH PRIOR TO SEXUAL ACTIVITY AS NEEDED. 1 tablet 11     No current facility-administered medications for this visit.        ROS:   10 point review of systems positive as outlined above otherwise negative    Objective:    There were no vitals taken for this visit.  There is no height or weight on file to calculate BMI.      General appearance no acute distress    HEENT: No congestion no sore throat neck is supple    Lungs: Nonlabored breathing no wheezing.    Heart: No palpitation or rapid heart rate    Abdomen denies any bloating or pain    Extremities denies any edema.    Right sided hemiplegia as before with contraction deformity right upper extremity.    I believe he still wears an AFO on his right leg.    No skin rashes.    Results for orders placed or performed in visit on 11/18/19   PSA (Prostatic-Specific Antigen), Diagnostic   Result Value Ref Range    PSA 4.0 0.0 - 4.5 ng/mL   Lipid Cascade FASTING   Result Value Ref Range    Cholesterol 125 <=199 mg/dL    Triglycerides 163 (H) <=149 mg/dL    HDL Cholesterol 34 (L) >=40 mg/dL    LDL Calculated 58 <=129 mg/dL    Patient Fasting > 8hrs? Yes    HM2(CBC w/o Differential)   Result Value Ref Range    WBC 6.3 4.0 - 11.0 thou/uL    RBC 4.52 4.40 - 6.20 mill/uL    Hemoglobin 14.0 14.0 - 18.0 g/dL    Hematocrit 41.2 40.0 - 54.0 %    MCV 91 80 - 100 fL    MCH 30.9 27.0 - 34.0 pg    MCHC 33.9 32.0 - 36.0 g/dL    RDW 12.1 11.0 - 14.5 %    Platelets 285 140 - 440 thou/uL    MPV 8.5 7.0 - 10.0 fL   Comprehensive Metabolic Panel   Result Value Ref Range    Sodium 142 136 - 145 mmol/L    Potassium 4.6 3.5 - 5.0 mmol/L    Chloride 107 98 - 107 mmol/L    CO2 26 22 - 31 mmol/L    Anion Gap, Calculation 9 5 - 18 mmol/L    Glucose 82  70 - 125 mg/dL    BUN 19 8 - 22 mg/dL    Creatinine 0.78 0.70 - 1.30 mg/dL    GFR MDRD Af Amer >60 >60 mL/min/1.73m2    GFR MDRD Non Af Amer >60 >60 mL/min/1.73m2    Bilirubin, Total 0.4 0.0 - 1.0 mg/dL    Calcium 9.3 8.5 - 10.5 mg/dL    Protein, Total 6.6 6.0 - 8.0 g/dL    Albumin 3.5 3.5 - 5.0 g/dL    Alkaline Phosphatase 121 (H) 45 - 120 U/L    AST 11 0 - 40 U/L    ALT 10 0 - 45 U/L   Valproic Acid (Depakene )   Result Value Ref Range    Valproic Acid 81.9 50.0 - 150.0 ug/mL       Assessment:  1. Wrist pain, chronic, left  oxyCODONE-acetaminophen (PERCOCET/ENDOCET) 5-325 mg per tablet   2. Hemiplegia, unspecified etiology, unspecified hemiplegia type, unspecified laterality (H)  Ambulatory referral to Physical Medicine Rehab    Ambulatory referral to Care Management (Primary Care)   3. Aneurysm Of The Basilar Tip       Chronic left wrist pain significant osteoarthritis, refill on the Percocet.  He has surgery scheduled for fusion and scaphoid excision on 10/28/2020 will need TCU following this because he has the right hemiplegia as well.    We will try to get Botox injection to see if that helps.    Previous basilar aneurysm bleed.    Preop video 10/20/2020.    Please see above discussion regarding communicating the plans with his ex-wife, Imani    Plan: As outlined above    This transcription uses voice recognition software, which may contain typographical errors.    Video-Visit Details    Type of service:  Video Visit    Video End Time (time video stopped): 3:36 PM  Originating Location (pt. Location): Home    Distant Location (provider location):  [facility]/OB     Platform used for Video Visit: Vincent Valdez MD

## 2021-06-11 NOTE — TELEPHONE ENCOUNTER
I am not sure we have permission to talk to his ex-wife.    Let her know that I did review her questions and concerns.    Please schedule a virtual visit with me for this patient, it is not a preop yet, but a visit to review these concerns which I think are valid.

## 2021-06-11 NOTE — TELEPHONE ENCOUNTER
Left a message for MR of [facility] to fax the recent office notes to [phone]. Will call back before pt's appt today to make sure the [facility] received my message.

## 2021-06-11 NOTE — TELEPHONE ENCOUNTER
Who is calling:  Patient's Ex Wife Imani  Reason for Call:  Calling to inform provider patient will be having left hand surgery on 10/28/2020. Patient has mobility issues in his right hand due to a Stroke. Imani is questioning if patient will be a candidate for a TCU post surgery. Also questioning if provider feels a Neurology referral would be a good idea, so the Neurologist can provide Botox. Please reach out to Imani and advise. Imani is going to be scheduling a Pre-Op Physical at a later date.  Date of last appointment with primary care: 04/23/2020  Okay to leave a detailed message: Yes

## 2021-06-11 NOTE — TELEPHONE ENCOUNTER
Upcoming Appointment Question  When is the appointment: Today  What is your appointment for?: Follow up  Who is your appointment scheduled with?: PCP only  What is your question/concern?: Imani needs Dr Valdez's CSS to call [facility] Medical Records before patients appointment today with Dr Valdez to have recent office notes faxed over.  [facility] phone number is .  Okay to leave a detailed message?: Yes

## 2021-06-11 NOTE — PROGRESS NOTES
Clinic Care Coordination Contact  Community Health Worker Initial Outreach    CHW Initial Information Gathering:  Preferred Urgent Care: [facility], [phone]  Current living arrangement:: I live alone  Type of residence:: Apartment - handicap accessible  Community Resources: Other (see comment)(Possible ILS Worker)  Supplies used at home:: None  Equipment Currently Used at Home: orthotic device, cane, straight, grab bar, toilet, grab bar, tub/shower  Informal Support system:: Friends, Other(Ex-wife)  No PCP office visit in Past Year: No  Transportation means:: [employer], Other, Friend([employer])       Patient accepts CC: Yes. Patient scheduled for assessment with the SW on 10/1/20 at 11:00 am. Patient noted desire to discuss care after his surgery. The appointmetn will be completed with his Ex-wife due to the patient's medical condition..     Next Outreach: 10/14/20

## 2021-06-12 NOTE — PROGRESS NOTES
I agree, he needs TCU after surgery.  That is what I said in my note on 9/15/2020.   I am not sure how to get him into a TCU after his outpatient surgery.  That is why I sent him to [facility] for our social workers to help get that set up.  The supporting information is in my note from 9/15/2020.  Please help me get him into a TCU, I am not sure how to do this.  Maybe you need to talk to the surgery center where he will be having surgery, and talk to the social workers there, I am not sure.    Also in my note from 9/15/2020 I said that he needed Botox, I talked to Janki, and referred him to [facility].  She was going to check on that to make sure that is the place to go for Botox.  I have not heard back regarding that.  Is that where he goes to get Botox?  Or should he see some other specialist.  The documentation for the Botox is also in my note from 9/15/2020.    Vernon, please get back to me regarding how we get the patient into TCU following surgery    Janki,  please get back to me regarding the specialist that he needs to go to for the Botox

## 2021-06-12 NOTE — TELEPHONE ENCOUNTER
Patient Returning Call  Reason for call:  Raissa called back.  Information relayed to patient:  Informed of Dr Valdez's message listed below.  Nurse states need ritalin and ambien and nicotine gum e-scribed to [facility] pharmacy. (need sig on nicotine gum, please)    Also need verbal order on how long to wear brace of his right leg. Off at night or on all the time?       Patient has additional questions:  Yes  If YES, what are your questions/concerns:  As above.  Okay to leave a detailed message?: Yes

## 2021-06-12 NOTE — PROGRESS NOTES
"Nino Stephen is a 60 y.o. male who is being evaluated via a billable video visit.      The patient has been notified of following:     \"This video visit will be conducted via a call between you and your physician/provider. We have found that certain health care needs can be provided without the need for an in-person physical exam.  This service lets us provide the care you need with a video conversation.  If a prescription is necessary we can send it directly to your pharmacy.  If lab work is needed we can place an order for that and you can then stop by our lab to have the test done at a later time.    Video visits are billed at different rates depending on your insurance coverage. Please reach out to your insurance provider with any questions.    If during the course of the call the physician/provider feels a video visit is not appropriate, you will not be charged for this service.\"    Patient has given verbal consent to a Video visit? Yes  How would you like to obtain your AVS? AVS Preference: Mail a copy.  If dropped by the video visit, the video invitation should be sent to: Text to cell phone: [phone]  Will anyone else be joining your video visit? Yes: daughter: gordy. How would they like to receive their invitation? Text to cell phone: [phone]        Video Start Time: 11:08 AM    Additional provider notes:       Video-Visit Details    Type of service:  Video Visit    Video End Time (time video stopped): 11:37 AM  Originating Location (pt. Location): Home    Distant Location (provider location):  [facility]     Platform used for Video Visit: CompleteSetasa Valdez MD        Proposed Surgery/ Procedure: LEFT WRIST FOUR CORNER FUSION WITH E-Z CLIP STAPLES AND LEFT SCAPHOID EXCISION  Date of Surgery/ Procedure: 10/28/2020  Time of Surgery/ Procedure: 6am   Hospital/Surgical Facility: [facility]   [place]       Surgery Fax Number: [phone]    Primary " Physician: Marlo Valdez MD  Type of Anesthesia Anticipated: unknown        Have you ever had a heart attack or stroke? YES: stroke  Have you ever had surgery on your heart or blood vessels, such as a stent, coronary (heart) bypass, or surgery on an artery in the head, neck, heart, or legs? No  Do you have chest pain when you are physically active? No  Do you have a history of heart failure? No  Do you currently have a cold, bronchitis, or symptoms of other respiratory (head and chest) infections? No  Do you have a cough, shortness of breath, or wheezing? No  Do you or anyone in your family have a history of blood clots? No  Do you or anyone in your family have a serious bleeding problem, such as long-lasting bleeding after surgeries or cuts? No  Have you ever had anemia or been told to take iron pills? No  Have you had any abnormal blood loss such as black, tarry or bloody stools, or abnormal vaginal bleeding?No  Have you ever had a blood transfusion? No   Are you willing to have a blood transfusion if it is medically needed before, during, or after your surgery? Yes  Have you or anyone in your family ever had problems with anesthesia (sedation for surgery)? No  Do you have sleep apnea, excessive snoring, or daytime drowsiness? No  Do you have any artifical heart valves or other implanted medical devices, such as a pacemaker, defibrillator, or continuous glucose monitor? No  Do you have any artifical joints? No  Are you allergic to latex? No  Is there any chance that you may be pregnant? No    Patient has a Health Care Directive or Living Will:    [facility] PRE-OPERATIVE VISIT FOR:    Nino Stephen, TED 1960, MRN [id number]        PCP: Marlo Valdez MD, [phone]    SUBJECTIVE:  History of Present Illness:   Nino Stephen is a 60 y.o. male who had a virtual visit today, video, due to the coronavirus pandemic.    He has a long history of left wrist pain he has severe osteoarthritis  and needs surgery as outlined above.    Patient has past medical history of a cerebral aneurysm bleed and has residual right hemiparesis.  Not able to function well with he is right hand.  He is getting Botox shots for that.    Because of his nonuse of his right hand and now surgery on his left hand he will need TCU placement following this surgery    This will be through [facility] and services at [facility] phone number [phone].  Fax number [phone].    Patient has had previous surgery with hernia repair and also had trauma to the chest with pneumothorax and rib fractures had a chest tube last year for that no residual.    Patient denies any COVID-19 symptoms or exposures he will be getting a COVID-19 test, ordered by the orthopedist for Monday, October 26.    Patient will be coming in for vital signs chest x-ray EKG and labs please see below this will be approximately 10/23/2020.    His medications are up-to-date.    He has been on Percocet 5/325 tablets for his wrist pain    Postop pain medicine will be per his orthopedic surgeon, Dr. Pate.    Patient has a history of depression has been controlled.        Past Medical History:  Patient Active Problem List   Diagnosis     Aneurysm Of The Basilar Tip     Major Depression, Single Episode     Chronic Cutaneous Ulcer Venous Stasis     Contusion Of The Right Chest Wall With Intact Skin Surface     Hyperlipidemia     Hemiparesis     Nicotine Dependence     Inguinal Hernia       History of bleeding: Negative     History of tobacco use: Negative    History of anesthesia reactions: Negative    No family history for anesthesia or bleeding problems    Social History:  he  reports that he has quit smoking. His smoking use included cigarettes. He has never used smokeless tobacco. He reports that he does not drink alcohol or use drugs.    Family History:  Family History   Problem Relation Age of Onset     No Medical Problems Mother      No Medical  Problems Father    Family history for leukemia and neurofibromatosis.    Current Medications:  Current Outpatient Medications   Medication Sig Dispense Refill     buPROPion (WELLBUTRIN XL) 150 MG 24 hr tablet TAKE 1 TABLET BY MOUTH EVERY DAY IN THE MORNING 90 tablet 3     divalproex (DEPAKOTE DR) 500 MG 12 hour tablet TAKE 2 TABLETS BY MOUTH TWICE A  tablet 2     methylphenidate (RITALIN) 10 MG tablet Take 5-10 mg by mouth 2 (two) times a day.        oxyCODONE-acetaminophen (PERCOCET/ENDOCET) 5-325 mg per tablet Take 1 tablet by mouth every 6 (six) hours as needed for pain. Uses for wrist pain as needed 40 tablet 0     venlafaxine (EFFEXOR-XR) 75 MG 24 hr capsule TAKE 3 CAPSULES (225 MG TOTAL) BY MOUTH DAILY. 270 capsule 0     zolpidem (AMBIEN) 5 MG tablet Take 1 tablet (5 mg total) by mouth at bedtime as needed for sleep. 30 tablet 2     baclofen (LIORESAL) 10 MG tablet Take 1 tablet (10 mg total) by mouth 2 (two) times a day.  0     No current facility-administered medications for this visit.        Allergies:  Patient has no known allergies.    Review or Systems:   10 point review of systems positive as outlined above otherwise negative    OBJECTIVE: This was a virtual visit  There were no vitals filed for this visit.  General Appearance:    Alert, cooperative, no distress, appears stated age   Head:    Normocephalic, without obvious abnormality, atraumatic   Eyes:   No scleral icterus normal extraocular movements   Nose:   Mucosa moist, normal    Throat:   Lips, mucosa, and tongue normal; ora phaynx clear   Neck:  No adenopathy no tenderness   Lungs:    Nonlabored breathing, no wheezing    Heart:   Denies any palpitations or rapid heart rate   Abdomen:    No abd pain or bloating   Extremities:  Patient has some contracture to right upper extremity    Pain in the left wrist no significant swelling    No peripheral edema in lower extremity.    He does have a PFO in the right leg/foot       Skin:   Skin  color, texture, turgor normal, no rashes or lesions   Neurologic:   Right hemiparesis         Labs: Patient will come in for lab work of CMP, CBC, Depakote level    Patient will also get a chest x-ray and an EKG at that time    COVID-19 testing per his orthopedist      ASSESSMENT/PLAN:  1. Preop general physical exam  XR Chest 2 Views    Electrocardiogram Perform and Read    Comprehensive Metabolic Panel    HM2(CBC w/o Differential)   2. Wrist pain, chronic, left     3. Aneurysm Of The Basilar Tip  Valproic Acid (Depakene )   4. Major depressive disorder with single episode, remission status unspecified     5. Hyperlipidemia, unspecified hyperlipidemia type       Patient is okay for surgery on his left wrist pending results of his chest x-ray EKG and lab work    He has previous aneurysm rupture with right hemiplegia    Major depressive disorder stable on meds    Because of his condition he will need rehab at TCU as he will be unable to use right and left hands.  Please see above regarding information of the TCU    Patient will hold the methylphenidate morning of surgery.        Marlo Valdez MD

## 2021-06-12 NOTE — PROGRESS NOTES
Clinic Care Coordination Contact  Care Team Conversations     SW contacted [facility] to check on status. They reported they didn't receive the referral, but they did report that he would need a PT/OT evaluation before they could consider. The admission nurse reported that since this was an outpatient surgery he doesn't sound like he would meet their criteria because that is what they focus on.     LING contacted [facility] admissions nurse. She reported she will be forwarding the information to billing to confirm coverage and call back.    LING contacted Imani to update her. She confirmed they have the botox appointment. She is wondering if he gets approved for [facility] if they are able to tour the facility and also what is their visiting policy. She will be calling Nino's CADI  today as well.

## 2021-06-12 NOTE — PROGRESS NOTES
Clinic Care Coordination Contact    Follow Up Progress Note      Assessment: SW contacted Imani to check in. They are all doing well and Nino is in the TCU. SW completed goal and discussed moving patient to maintenance.    Goals addressed this encounter:   Goals Addressed                 This Visit's Progress      COMPLETED: Functional (pt-stated)        As of 11/3/2020, I am in TCU at [facility]                 Intervention/Education provided during outreach: See above          Plan:   LING moved patient to maintenance, SW will chart review in 45 days, CHW to do standard outreach

## 2021-06-12 NOTE — PROGRESS NOTES
Code Status:  FULL CODE  Visit Type: Follow-up (pain, rehab)     Facility:  [facility] [[id number]]        Facility Type: SNF (Skilled Nursing Facility, TCU)    History of Present Illness: Nino Stephen is a 60 y.o. male with a past medical history for OA, cerebral aneurysm, right hemiparesis, depression, HLD, tobacco dependence. He had same day surgery yesterday for left wrist OA.  He underwent a left wrist four corner fusion with E-Z clip staples and left scaphoid excision. He required TCU admission as he is unable to use his left hand due to surgery and he has a contracted right hand 2/2 stroke.      Today, he reports his pain is about the same. Edema of fingers are less and have good cms.  Therapy states he continues to have difficult with ambulation due to hemiparesis and left arm NWB status.  He has good strength so they are confident he will be independent in some way.     Review of Systems   Patient denies fever, chills, headache, lightheadedness, dizziness, rhinorrhea, cough, congestion, shortness of breath, chest pain, palpitations, abdominal pain, n/v, diarrhea, constipation, change in appetite, dysuria, frequency, burning or pain with urination.  Other than stated in HPI all other review of systems is negative.         Physical Exam   In order to maintain social distancing during the COVID pandemic, a visual exam was completed.     Vitals:    11/05/20 0853   BP: 134/89   Pulse: 87   Resp: 18   Temp: 97.5  F (36.4  C)   SpO2: 96%        Patient is well nourished and no acute distress.  Head is AT/NC, EOM intact. Respiratory effort normal. No visible LE edema. Alert and oriented, face symmetric.  Good CMS to left hand with mobility and brachial pulse.  No visible skin issues or rashes. Mood euthymic      Labs:   Recent Results (from the past 240 hour(s))   HM2(CBC w/o Differential)   Result Value Ref Range    WBC 6.6 4.0 - 11.0 thou/uL    RBC 4.20 (L) 4.40 - 6.20 mill/uL    Hemoglobin 12.8 (L)  14.0 - 18.0 g/dL    Hematocrit 39.3 (L) 40.0 - 54.0 %    MCV 94 80 - 100 fL    MCH 30.5 27.0 - 34.0 pg    MCHC 32.6 32.0 - 36.0 g/dL    RDW 14.4 11.0 - 14.5 %    Platelets 263 140 - 440 thou/uL    MPV 11.0 8.5 - 12.5 fL         Assessment:  1. 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 Ankylosis of left wrist     3. Hemiplegia of dominant side as late effect following cerebrovascular disease (H)     4. Weakness         Plan:   Pain is controlled with APAP and oxycodone.  Continue therapies with the plan to discharge to home with a walker of some type.           Electronically signed by: Edna Hua CNP

## 2021-06-12 NOTE — TELEPHONE ENCOUNTER
"Faxed records request to locations needed.   Called and left message for pt to return call.# 1  \" Okay to relay message\"    "

## 2021-06-12 NOTE — TELEPHONE ENCOUNTER
Received the prescriptions and needed a signature from Dr. Valdez so these were put in his inbox to be signed before faxing. Will fax once we get the prescriptions back from Dr. Valdez.

## 2021-06-12 NOTE — TELEPHONE ENCOUNTER
FYI - Status Update  Who is Calling: Raissa from [facility]  Update: Caller stated the patient is coming to their facility tomorrow and the caller was to make sure the clinic received and addressed their orders by tomorrow.    Caller stated the orders are to address the patient's medications such as Ritalin, Ambien, and smoking cessation medication.    Caller stated to call her with questions at [phone].  Okay to leave a detailed message?:  No

## 2021-06-12 NOTE — TELEPHONE ENCOUNTER
Patient Returning Call  Reason for call:  Raissa returning call  Information relayed to patient:  Caller is asking for a call back ASAP  Patient has additional questions:  No  If YES, what are your questions/concerns:  none  Okay to leave a detailed message?: Yes

## 2021-06-12 NOTE — TELEPHONE ENCOUNTER
Medication Request  Medication name: Nicotine Gum  Requested Pharmacy: [facility]  Reason for request: Smoking cessation  When did you use medication last?:  Has not started yet  Patient offered appointment: Caller is not the patient  Okay to leave a detailed message: yes    Medication Request  Medication name: Ritalin 10 mg, three times a day   Requested Pharmacy: [facility]  Reason for request: Stroke history  When did you use medication last?:  Current medication with dose increase  Patient offered appointment: Caller is not the patient  Okay to leave a detailed message: yes          Please also call verbal order to Raissa.  Patient has arrived to the facility and will need these medications today.    If Raissa is not there, please call nurses station at [phone].

## 2021-06-12 NOTE — TELEPHONE ENCOUNTER
----- Message from Marlo Valdez MD sent at 10/26/2020  7:44 AM CDT -----  Please fax these labs to [facility], [place] for surgery, fax number [phone]    Also please fax that EKG and the chest x-ray from the same date.    Also please let the patient know that the labs and x-ray and EKG were okay for surgery.  No change in medication.

## 2021-06-12 NOTE — TELEPHONE ENCOUNTER
Attempt call 1. Left detailed message on Raissa's voicemail. Okay to relay message again from provider to her.

## 2021-06-12 NOTE — PROGRESS NOTES
Clinic Care Coordination Contact  Care Team Conversations     [facility] LING contacted [facility] to discuss process of referral from outpatient setting. They reported we can do a referral. The information needed is:    H&P, recent labs, facesheet, orders, functional status pre and post op.    [facility] also suggested talking to [facility] in [place].     LING spoke with Imani to update her about this information and faxed facesheet to [facility] and [facility] for insurance verification.    Aida from [facility] is Nino's CADI . Her phone number is [phone]. She reported that she has had difficulty getting Nino setup for services because he and Imani have not been interested in them in the past. LING explained this seems to be more important now because of the upcoming surgery and they have reported to LING they are wanting to pursue this. Aida reported that right now Nino could be approved for homemaking services, they need to find an agency. She reported she could help with PCA services if needed, but they need to commit to doing this. LING informed Imani of this and encouraged her to contact Aida and also informed her she needs to find an agency to be employed with if they are going to pursue this.     LING informed Imani that [facility] has been working on the botox referral.     LING waiting to hear back regarding insurance coverage for TCU.

## 2021-06-12 NOTE — PROGRESS NOTES
,   I sent request to [facility] on 09/16. I called them 09/18 and 09/28 and again today 10/05 to check the status of appt. Nothing has been scheduled yet. I stressed once again to them he is scheduled for surgery 10/28 and needs to be seen prior to this appt.  said she would pass it on to [facility] team to contact me.  ANDRES

## 2021-06-12 NOTE — TELEPHONE ENCOUNTER
Left a detailed message for Raissa at the [facility] that I have faxed the prescriptions and per Dr. Valdez said to use the right leg brace at all times other than in bed. I did also called Imani to let her know that the prescriptions has been faxed.

## 2021-06-12 NOTE — TELEPHONE ENCOUNTER
I set up the medications for methylphenidate zolpidem and Nicorette gum.  To be printed.    I am at home I doubt if they printed.    Please try to print those out and then fax to Raissa.    Also use the right leg brace at all times other than in bed.

## 2021-06-12 NOTE — TELEPHONE ENCOUNTER
Who is calling:  Imani  Reason for Call:  Patient will be going into [facility] TCU today.  The admission nurse needs medication orders for zolpidem, methylphenidate and nicorette gum.   The admission's nurse will send it to the pharmacy they use.  Secondly, the admission nurse will need an order regarding the right leg brace that patient wears (i.e. when it is to be worn).  Please fax to Raissa at [phone].  For questions about what is needed, please call Raissa at [phone].  Patient will be arriving to the TCU but 11:30 a.m.  Date of last appointment with primary care: 10/16/20  Okay to leave a detailed message: Yes

## 2021-06-12 NOTE — PROGRESS NOTES
Code Status:  FULL CODE  Visit Type: Hospital Visit Follow Up (TCU Admit, s/p wrist fusion, pain, hx CVA, weakness)     Facility:  [facility] [[id number]]      Facility Type: SNF (Skilled Nursing Facility, TCU)    History of Present Illness:   Hospital Admission Date: 10/28/2020 Hospital Discharge Date: 10/28/2020  Facility Admission Date: 10/28/2020 from [facility]     Nino Stephen is a 60 y.o. male with a past medical history for OA, cerebral aneurysm, right hemiparesis, depression, HLD, tobacco dependence. He had same day surgery yesterday for left wrist OA.  He underwent a left wrist four corner fusion with E-Z clip staples and left scaphoid excision. He required TCU admission as he is unable to use his left hand due to surgery and he has a contracted right hand 2/2 stroke.      Today, he reports his pain is controlled with Ibuprofen and oxycodone.  He has been using nicoderm as he has quit smoking.     Past Medical History:   Diagnosis Date     Depression      Hemiparesis as late effect of cerebral aneurysm (H)     right side     Hyperlipidemia      Inguinal hernia     bilateral     Seizures (H)      Stroke (H)     brain aneurysm     Past Surgical History:   Procedure Laterality Date     BRAIN SURGERY      ruptured aneurysm with coil     Family History   Problem Relation Age of Onset     No Medical Problems Mother      No Medical Problems Father      Social History     Socioeconomic History     Marital status: Patient Declined     Spouse name: Not on file     Number of children: Not on file     Years of education: Not on file     Highest education level: Not on file   Occupational History     Not on file   Social Needs     Financial resource strain: Not on file     Food insecurity     Worry: Not on file     Inability: Not on file     Transportation needs     Medical: Not on file     Non-medical: Not on file   Tobacco Use     Smoking status: Former Smoker     Types: Cigarettes     Smokeless  tobacco: Never Used   Substance and Sexual Activity     Alcohol use: No     Drug use: No     Sexual activity: Not on file   Lifestyle     Physical activity     Days per week: Not on file     Minutes per session: Not on file     Stress: Not on file   Relationships     Social connections     Talks on phone: Not on file     Gets together: Not on file     Attends Buddhist service: Not on file     Active member of club or organization: Not on file     Attends meetings of clubs or organizations: Not on file     Relationship status: Not on file     Intimate partner violence     Fear of current or ex partner: Not on file     Emotionally abused: Not on file     Physically abused: Not on file     Forced sexual activity: Not on file   Other Topics Concern     Not on file   Social History Narrative     Not on file       Current Outpatient Medications   Medication Sig Dispense Refill     acetaminophen (TYLENOL) 500 MG tablet Take 1,000 mg by mouth every 6 (six) hours as needed for pain.       baclofen (LIORESAL) 10 MG tablet Take 10 mg by mouth 3 (three) times a day.       divalproex (DEPAKOTE DR) 500 MG 12 hour tablet TAKE 2 TABLETS BY MOUTH TWICE A  tablet 2     HYDROmorphone (DILAUDID) 2 MG tablet Take 2 mg by mouth every 4 (four) hours as needed for pain.       hydrOXYzine pamoate (VISTARIL) 25 MG capsule Take 25 mg by mouth every 6 (six) hours as needed for itching.       ibuprofen (ADVIL,MOTRIN) 600 MG tablet Take 600 mg by mouth 3 (three) times a day.       methylphenidate HCl (RITALIN) 10 MG tablet 1 po tid 90 tablet 0     oxycodone HCl (OXYCODONE ORAL) Take 5 mg by mouth every 4 (four) hours as needed.       senna-docusate (SENNOSIDES-DOCUSATE SODIUM) 8.6-50 mg tablet Take 1 tablet by mouth 2 (two) times a day.       venlafaxine (EFFEXOR-XR) 75 MG 24 hr capsule TAKE 3 CAPSULES (225 MG TOTAL) BY MOUTH DAILY. 270 capsule 0     zolpidem (AMBIEN) 5 MG tablet Take 1 tablet (5 mg total) by mouth at bedtime as needed  for sleep. (Patient taking differently: Take 5 mg by mouth at bedtime. ) 30 tablet 0     No current facility-administered medications for this visit.      No Known Allergies  Immunization History   Administered Date(s) Administered     INFLUENZA,RECOMBINANT,INJ,PF QUADRIVALENT 18+YRS 02/18/2019, 11/18/2019     INFLUENZA,SEASONAL QUAD, PF, =/> 6months 09/22/2015     Influenza, seasonal,quad inj 6-35 mos 09/27/2010, 10/24/2013, 10/22/2014     Influenza,seasonal, Inj IIV3 10/01/2014     Td,adult,historic,unspecified 12/31/2005     Tdap 02/18/2019     Tetanus Toxoid, Adsorbed 12/09/2005       Post Discharge Medication Reconciliation Status: discharge medications reconciled, continue medications without change    Review of Systems   Patient denies fever, chills, headache, lightheadedness, dizziness, rhinorrhea, cough, congestion, shortness of breath, chest pain, palpitations, abdominal pain, n/v, diarrhea, constipation, change in appetite, dysuria, frequency, burning or pain with urination.  Other than stated in HPI all other review of systems is negative.         Physical Exam   Vitals:    10/29/20 1120   BP: 121/76   Pulse: 98   Resp: 14   Temp: 98.6  F (37  C)   SpO2: 95%       GENERAL APPEARANCE: Well developed, well nourished, in no acute distress.  HEENT: normocephalic, atraumatic  PERRL, sclerae anicteric, conjunctivae clear and moist, EOM intact  LUNGS: Lung sounds CTA, no adventitious sounds, respiratory effort normal.  CARD: RRR, S1, S2, without murmurs, gallops, rubs  ABD: Soft and nontender with normal bowel sounds.   EXTREMITIES: No cyanosis, clubbing or edema of BLE.  Left hand with hard splint and ace, fingers with minor edema, good mobility.   NEURO: Alert and oriented x 3, with some forgetfullness.Face is symmetric. Right hand contracture, right foot inversion with AFO  SKIN: Inspection of the skin reveals no rashes, ulcerations or petechiae.  PSYCH: euthymic              Labs:    Recent Results (from  the past 240 hour(s))   Comprehensive Metabolic Panel   Result Value Ref Range    Sodium 143 136 - 145 mmol/L    Potassium 4.5 3.5 - 5.0 mmol/L    Chloride 108 (H) 98 - 107 mmol/L    CO2 25 22 - 31 mmol/L    Anion Gap, Calculation 10 5 - 18 mmol/L    Glucose 93 70 - 125 mg/dL    BUN 19 8 - 22 mg/dL    Creatinine 0.75 0.70 - 1.30 mg/dL    GFR MDRD Af Amer >60 >60 mL/min/1.73m2    GFR MDRD Non Af Amer >60 >60 mL/min/1.73m2    Bilirubin, Total 0.3 0.0 - 1.0 mg/dL    Calcium 9.2 8.5 - 10.5 mg/dL    Protein, Total 7.0 6.0 - 8.0 g/dL    Albumin 3.7 3.5 - 5.0 g/dL    Alkaline Phosphatase 76 45 - 120 U/L    AST 15 0 - 40 U/L    ALT 10 0 - 45 U/L   HM2(CBC w/o Differential)   Result Value Ref Range    WBC 7.9 4.0 - 11.0 thou/uL    RBC 4.86 4.40 - 6.20 mill/uL    Hemoglobin 14.9 14.0 - 18.0 g/dL    Hematocrit 45.6 40.0 - 54.0 %    MCV 94 80 - 100 fL    MCH 30.7 27.0 - 34.0 pg    MCHC 32.7 32.0 - 36.0 g/dL    RDW 12.2 11.0 - 14.5 %    Platelets 262 140 - 440 thou/uL    MPV 8.5 7.0 - 10.0 fL   Valproic Acid (Depakene )   Result Value Ref Range    Valproic Acid 60.2 50.0 - 150.0 ug/mL   Electrocardiogram Perform and Read   Result Value Ref Range    SYSTOLIC BLOOD PRESSURE      DIASTOLIC BLOOD PRESSURE      VENTRICULAR RATE 77 BPM    ATRIAL RATE 77 BPM    P-R INTERVAL 140 ms    QRS DURATION 82 ms    Q-T INTERVAL 356 ms    QTC CALCULATION (BEZET) 402 ms    P Axis 60 degrees    R AXIS 46 degrees    T AXIS 54 degrees    MUSE DIAGNOSIS       Normal sinus rhythm  Cannot rule out Anterior infarct , age undetermined  Abnormal ECG  No previous ECGs available  Confirmed by KAYLAH SHELTON MD LOC:JN ([id number]) on 10/22/2020 2:06:17 PM     COVID-19 VIRUS PCR MRF    Specimen: Nasopharyngeal   Result Value Ref Range    COVID-19 VIRUS SPECIMEN SOURCE Nasopharyngeal     2019-nCOV Not Detected          Assessment:  1. 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 Ankylosis of left wrist     3. Primary osteoarthritis of left wrist     4.  Hemiplegia of dominant side as late effect following cerebrovascular disease (H)     5. Major depressive disorder with single episode, remission status unspecified     6. Cerebral aneurysm without rupture     7. Nicotine Dependence         Plan:    S/p fusion: pain is controlled, continue with ibuprofen and oxycodone.  Counseled patient on likelihood of weaning oxycodone off in the next 2 weeks.  Follow up with orthopedic surgeon. Therapies.      Hemiplegia with history of stroke: chronic, manage weakness with therapies. On baclofen for spascity.  Divalproex for seizure prevention.  Takes ritalin     Depression: stable, venlafaxine and bupropion    Nicotine dependence: order for nicoderm.         Electronically signed by: Edna Hua, CNP

## 2021-06-12 NOTE — PROGRESS NOTES
Clinic Care Coordination Contact  Care Team Conversations     LING contacted Андрей, [employer] Care Coordinator, to discuss transportation. She reported they have the medical cabs or vans.     LING contacted Imani to inform her, she decided she will take him to the TCU.        LING will plan to outreach within the next week to check in.

## 2021-06-12 NOTE — PROGRESS NOTES
Clinic Care Coordination Contact  Care Team Conversations     LING contacted Rachel at [facility] in [place].    She reported the billing department was able to confirm that Nino's insurance would cover his stay at the TCU.    Rachel reported that she will need:    H&P (Pre-Op) visit will count for this    [facility] will need to send orders to be completed at [place]'s visit on 10/20/20. Can call on 10/19/2020 to have these orders requested to be sent over.     After the surgery they will need: any new medication orders, wound care needs, and any other important information about post-op care.    She may need to speak with the discharging nurse and if the surgery is later in the day, they cannot take admissions after 5pm.     [facility] LING will need to inform Aida of TCU admission to close out CADI waiver. LING will assist in coordinating this information with the surgeon's care coordinator.     LING left Imani a message to update her.

## 2021-06-12 NOTE — PROGRESS NOTES
Code Status:  FULL CODE  Visit Type: Follow-up (Pain, s/p wrist fusion, weakness, CVA)     Facility:  [facility] [[id number]]        Facility Type: SNF (Skilled Nursing Facility, TCU)    History of Present Illness: Nino Stephen is a 60 y.o. male with a past medical history for OA, cerebral aneurysm, right hemiparesis, depression, HLD, tobacco dependence. He had same day surgery yesterday for left wrist OA.  He underwent a left wrist four corner fusion with E-Z clip staples and left scaphoid excision. He required TCU admission as he is unable to use his left hand due to surgery and he has a contracted right hand 2/2 stroke.      Today, that his pain is well controlled with as needed oxycodone however does not take that much of this.  He has not been taking the as needed Dilaudid or Vistaril.  Nursing reports that he has been refusing his evening dose of methylphenidate and he reports he had like to take this only twice daily and be done with this medication by 4 PM in the afternoon due to overstimulating and not being able to sleep.    Review of Systems   Patient denies fever, chills, headache, lightheadedness, dizziness, rhinorrhea, cough, congestion, shortness of breath, chest pain, palpitations, abdominal pain, n/v, diarrhea, constipation, change in appetite, dysuria, frequency, burning or pain with urination.  Other than stated in HPI all other review of systems is negative.         Physical Exam   In order to maintain social distancing during the COVID pandemic, a visual exam was completed.     Vitals:    11/02/20 0851   BP: 146/82   Pulse: 90   Resp: 16   Temp: 98.2  F (36.8  C)   SpO2: 95%        Patient is well nourished and no acute distress.  Head is AT/NC, EOM intact. Respiratory effort normal. No visible LE edema. Alert and oriented, face symmetric.  Good CMS to left hand with mobility and brachial pulse.  No visible skin issues or rashes. Mood euthymic      Labs:   Recent Results (from the  past 240 hour(s))   COVID-19 VIRUS PCR MRF    Specimen: Nasopharyngeal   Result Value Ref Range    COVID-19 VIRUS SPECIMEN SOURCE Nasopharyngeal     2019-nCOV Not Detected    HM2(CBC w/o Differential)   Result Value Ref Range    WBC 6.6 4.0 - 11.0 thou/uL    RBC 4.20 (L) 4.40 - 6.20 mill/uL    Hemoglobin 12.8 (L) 14.0 - 18.0 g/dL    Hematocrit 39.3 (L) 40.0 - 54.0 %    MCV 94 80 - 100 fL    MCH 30.5 27.0 - 34.0 pg    MCHC 32.6 32.0 - 36.0 g/dL    RDW 14.4 11.0 - 14.5 %    Platelets 263 140 - 440 thou/uL    MPV 11.0 8.5 - 12.5 fL         Assessment:  1. Pain     2. Weakness     3. Hemiplegia of dominant side as late effect following cerebrovascular disease (H)         Plan:   Pain: Continue with as needed oxycodone.  We will schedule ibuprofen at 800 mg 1 time daily per patient request, also schedule APAP 3 times daily.  Discontinue Dilaudid and Vistaril.    Weakness: Right now is wheelchair-bound due to right-sided hemiplegia and inability to use his cane on his left side.  Therapy continues to work and advance his care as needed.  He will need to follow-up with orthopedics for his left wrist cast.    CVA: At this time will change the methylphenidate to twice daily due to overstimulating the evening.          Electronically signed by: Edna Hua CNP

## 2021-06-12 NOTE — TELEPHONE ENCOUNTER
I am not sure if this is a question or an FYI.    Do they have questions regarding the medications Ritalin and Ambien    And smoking cessation medication?

## 2021-06-12 NOTE — PROGRESS NOTES
Clinic Care Coordination Contact    Community Health Worker Follow Up    Goals:   Goals Addressed                 This Visit's Progress       Patient Stated      Functional (pt-stated)   20%     Goal Statement: I want to be aware of resources available to me post op for home care and/or TCU as soon as possible    Date Goal set: 10/02/20  Barriers: Insurance Coverage, Medical concerns  Strengths: Suppport  Date to Achieve By: ASAP  Patient expressed understanding of goal: Yes    Action steps to achieve this goal:  1. CCC team will work to assist with the process of getting home care and/or TCU services post op on 10/28/2020    (The SW is working with [facility] at this time, 10/14/20)    Next Outreach: 10/14/20        Medical (pt-stated)   10%     Goal Statement: I want to pursue botox for my right hand prior to 10/28//2020    Date Goal set: 10/02/20  Barriers: Unsure of process  Strengths: Support  Date to Achieve By: Prior to 10/28/2020  Patient expressed understanding of goal: Yes    Action steps to achieve this goal:  1. I will work with my PCP team and specialist to get botox in my right hand  2. I will update CCC team    (Patient is still wanting to complete this, 10/14/20)    Next Outreach: 10/14/20          Discussion: The CHW was able to follow up with the patient's ex-spouse at this time. The CHW informed her that the SW is working with the patient's Cadi  and the [facility] facility.    CHW Next Follow-up: One month    CHW Plan: Follow up in one month regarding the goals    Next Outreach: 11/11/20

## 2021-06-12 NOTE — TELEPHONE ENCOUNTER
[facility] requested new scripts for patients Ambien and Ritalin be sent to their pharmacy for patient's stay there starting this Wednesday.

## 2021-06-12 NOTE — PROGRESS NOTES
Clinic Care Coordination Contact  Care Team Conversations     SW left voicemail for TOMEKA FinnI , to inform her of Nino's admit to TCU on 10/28/2020. CADI Waiver will be closed while he is in TCU and Андрей will assist with the assessment to reopen once he is nearing discharge.

## 2021-06-12 NOTE — PROGRESS NOTES
Vernon, I filled out the forms for Nino today and fax them to [facility]    Can you touch base with Nino and he does ex-wife Imani, surrounding the details.    Let me know if I need to do anything further.

## 2021-06-12 NOTE — PROGRESS NOTES
Clinic Care Coordination Contact  Care Team Conversations     LING contacted Rachel at [facility]. Confirmed she received PCP's orders from Pre-Op visit. She will need the following from the surgeon's team:    Pain management information along with any prescriptions  Weight bearing status  Wound cares  Rehab orders  COVID Test information and procedure notes once completed.    LING contacted Arlene at Dr. Pate's office to inform her of the needed information and provided her the fax information for Rachel.    CHW was in contact with patient's ex wife, Imani, she is wondering about transportation.    Arlene reported since this is OP they will not have transportation. LING checked with [facility] and they will not have transportation either.    LING spoke with Imani she can do it, but is wondering if there is an option.    LING consulted with CCC RN, no known transportation resources.    LING left voicemail for Андрей ([facility]) to see if she has transportation resources, waiting call back.    LING faxed contact information to Rachel that included all care coordinators and .    LING will need to let Aida know about admission to TCU.

## 2021-06-12 NOTE — PROGRESS NOTES
[facility]/ [facility]s    Facility:  [facility] [[id number]]    Code Status:  FULL CODE    Chief Complaint   Patient presents with     H & P   :                    Patient Active Problem List   Diagnosis     Aneurysm Of The Basilar Tip     Major Depression, Single Episode     Chronic Cutaneous Ulcer Venous Stasis     Contusion Of The Right Chest Wall With Intact Skin Surface     Hyperlipidemia     Hemiparesis     Nicotine Dependence     Inguinal Hernia     Aphasia due to late effects of cerebrovascular disease     Cognitive deficits as late effect of cerebrovascular disease     Abnormality of gait     Fall     Hemiplegia of dominant side as late effect following cerebrovascular disease (H)     Hemothorax, right     Multiple rib fractures     Muscle spasm     Pneumothorax on right     Subarachnoid hemorrhage (H)     Right pulmonary contusion     Arthritis of left wrist       History:  Nino Stephen  is a 60 year old male with history of cerebral aneurysm rupture resulting in subarachnoid hemorrhage/CVA with right hemiparesis, aphasia, seizure, nicotine dependence (quit smoking about 5 years ago uses inhaler), hyperlipidemia, inguinal hernia, depression, Ritalin use, seen for admission to TCU on 11/1/2020    Hospital Course: Patient was admitted to same-day surgery and discharged on the same day, October 28.  He has severe left wrist arthritis and underwent 4 corner fusion with scaphoidectomy with Dr. Pate.  He required planned TCU admission postoperatively due to his right hemiparesis, left wrist surgery.  He lives alone.  Postoperative pain managed by ibuprofen, Dilaudid, Tylenol, Vistaril    Subjective/ROS:    -augmented by discussion with facility staff involved in direct care      -While  patient has significant aphasia, he communicates very well.  He has a good sense of humor and is quickwitted.  He is not dysarthric or quite minimally so if at all.  -Patient says his pain is  well-managed at this point.  He does have pain with movement though.  He has been able to use the left hand a bit to feed himself.  OT has been involved in giving him special silver handle thickener so he can manage the flat wire.  -Patient says he quit smoking about 5 years ago.  Is been nicotine dependent throughout however and uses nicotine inhaler.  -Patient says he has not had a seizure since very near the time of his subarachnoid hemorrhage, but remains on Keppra indefinitely  -Patient takes Ritalin per his neurologist following his CVA  -Patient has history of depression now stabilized with venlafaxine  -Patient is feeling well denies headaches change in vision, no chest pain shortness breath cough.  Eating adequately.  No constipation.  Staff reports no fever sweats chills urinary or stool issues.    Past Medical History:   Diagnosis Date     Depression      Hemiparesis as late effect of cerebral aneurysm (H)     right side     Hyperlipidemia      Inguinal hernia     bilateral     Seizures (H)      Stroke (H)     brain aneurysm     Past Surgical History:   Procedure Laterality Date     BRAIN SURGERY      ruptured aneurysm with coil          Family History   Problem Relation Age of Onset     No Medical Problems Mother      No Medical Problems Father    :   Family history: As opposed to the above there is a family history of leukemia and neurofibromatosis    Social History     Socioeconomic History     Marital status: Patient Declined     Spouse name: Not on file     Number of children: Not on file     Years of education: Not on file     Highest education level: Not on file   Occupational History     Not on file   Social Needs     Financial resource strain: Not on file     Food insecurity     Worry: Not on file     Inability: Not on file     Transportation needs     Medical: Not on file     Non-medical: Not on file   Tobacco Use     Smoking status: Former Smoker     Types: Cigarettes     Smokeless tobacco:  Never Used   Substance and Sexual Activity     Alcohol use: No     Drug use: No     Sexual activity: Not on file   Lifestyle     Physical activity     Days per week: Not on file     Minutes per session: Not on file     Stress: Not on file   Relationships     Social connections     Talks on phone: Not on file     Gets together: Not on file     Attends Church service: Not on file     Active member of club or organization: Not on file     Attends meetings of clubs or organizations: Not on file     Relationship status: Not on file     Intimate partner violence     Fear of current or ex partner: Not on file     Emotionally abused: Not on file     Physically abused: Not on file     Forced sexual activity: Not on file   Other Topics Concern     Not on file   Social History Narrative     Not on file   :    Patient says he was [place] with a handicap accessible apartment.  He lives alone.  Staff was a .  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  More recently he has worked in [employer].  He says he has a wheel in his bathroom.    Current Outpatient Medications on File Prior to Visit   Medication Sig Dispense Refill     acetaminophen (TYLENOL) 500 MG tablet Take 1,000 mg by mouth every 6 (six) hours as needed for pain.       baclofen (LIORESAL) 10 MG tablet Take 10 mg by mouth 3 (three) times a day.       divalproex (DEPAKOTE DR) 500 MG 12 hour tablet TAKE 2 TABLETS BY MOUTH TWICE A  tablet 2     HYDROmorphone (DILAUDID) 2 MG tablet Take 2 mg by mouth every 4 (four) hours as needed for pain.       hydrOXYzine pamoate (VISTARIL) 25 MG capsule Take 25 mg by mouth every 6 (six) hours as needed for itching.       ibuprofen (ADVIL,MOTRIN) 600 MG tablet Take 600 mg by mouth 3 (three) times a day.       methylphenidate HCl (RITALIN) 10 MG tablet 1 po tid 90 tablet 0     oxycodone HCl (OXYCODONE ORAL) Take 5 mg by mouth every 4 (four) hours as needed.        senna-docusate (SENNOSIDES-DOCUSATE SODIUM) 8.6-50 mg tablet Take 1 tablet by mouth 2 (two) times a day.       venlafaxine (EFFEXOR-XR) 75 MG 24 hr capsule TAKE 3 CAPSULES (225 MG TOTAL) BY MOUTH DAILY. 270 capsule 0     zolpidem (AMBIEN) 5 MG tablet Take 1 tablet (5 mg total) by mouth at bedtime as needed for sleep. (Patient taking differently: Take 5 mg by mouth at bedtime. ) 30 tablet 0     No current facility-administered medications on file prior to visit.    :      ALLERGIES:  Patient has no known allergies.    Vitals:  There were no vitals taken for this visit. except as noted below    Vital signs: Blood pressure 137/80 temperature 98.7 heart rate 92 respirations 18 O2 sats 94% on room air  Physical exam:    Patient is alert oriented x3 pleasant and conversant.  He appears to be breathing comfortably without accessory muscle use or tachypnea.  He demonstrates for me the weakness in his right hand/arm which is very significant.  He has some muscle atrophy there.  His left wrist/hand is in a cast.  He is able to manipulate his fork as his lunch he just arrived.  He has orthotic on his right leg/ankle/foot.  Purposeful movement of the left leg noted.  Skin is clear and visualized portions although quite limited visualization.      Due to the 2020 Covid 19 pandemic, except as noted above, the patient was visually observed at a 6 foot plus distance.  An observational exam was performed in an effort to keep patient safe from Covid 19 and other communicable diseases.   Labs:  Lab Results   Component Value Date    WBC 7.9 10/22/2020    HGB 14.9 10/22/2020    HCT 45.6 10/22/2020    MCV 94 10/22/2020     10/22/2020     Results for orders placed or performed in visit on 01/30/17   Basic Metabolic Panel   Result Value Ref Range    Sodium 139 136 - 145 mmol/L    Potassium 4.5 3.5 - 5.0 mmol/L    Chloride 104 98 - 107 mmol/L    CO2 25 22 - 31 mmol/L    Anion Gap, Calculation 10 5 - 18 mmol/L    Glucose 93 70 - 125  mg/dL    Calcium 9.4 8.5 - 10.5 mg/dL    BUN 21 8 - 22 mg/dL    Creatinine 0.83 0.70 - 1.30 mg/dL    GFR MDRD Af Amer >60 >60 mL/min/1.73m2    GFR MDRD Non Af Amer >60 >60 mL/min/1.73m2         No results found for: TSH  No results found for: HGBA1C  [unfilled]  No results found for: TRNSYAGQ82  No results found for: BNP  [unfilled]  Most Recent EKG     Units 10/22/20  1357   VENTRATE BPM 77   ATRIALRATE BPM 77   QRSDURATION ms 82   QTINTERVAL ms 356   QTCCALC ms 402   P [place] degrees 60   RAXIS degrees 46   TAXIS degrees 54   MUSEDX  Normal sinus rhythm  Cannot rule out Anterior infarct , age undetermined  Abnormal ECG  No previous ECGs available  Confirmed by KAYLAH SHELTON MD LOC:[facility] ([id number]) on 10/22/2020 2:06:17 PM           Assessment/Plan:      ICD-10-CM    1. Hemiplegia affecting right dominant side, unspecified etiology, unspecified hemiplegia type (H)  G81.91    2. Nicotine Dependence  F17.200    3. Aphasia due to late effects of cerebrovascular disease  I69.920    4. Hemiplegia of dominant side as late effect following cerebrovascular disease (H)  I69.959    5. Subarachnoid hemorrhage (H)  I60.9    6. Arthritis of left wrist  M19.032        Left wrist arthritis  Status post 4 corner fusion with scaphoidectomy October 20, 2020 with Dr. Rhodes   Patient's postoperative course appears uncomplicated to this point.  He has ibuprofen Dilaudid Tylenol and hydroxyzine for pain.  -Surgical follow-up  Surgical restrictions to be followed, has a 2 pound weight restriction  PT, OT,  all involved  -Wean opiates as able  -Has bowel program in place if need be    History of subarachnoid hemorrhage with aneurysm rupture/coiling  Right hemiplegia  Aphasia   As required patient to seek TCU care following his left wrist surgery and tell he recovers adequate use of his left hand which he is dependent.    Depression   Venlafaxine, bupropion continued without change    Nicotine dependence   Nicotine patch in  place    Insomnia   Zolpidem    Ritalin use   Longstanding per neurology following his CVA.    Seizure disorder   No seizure in many many years but remains on Keppra per neurology.        Case discussed with:    Facility staff         Shine Matt MD

## 2021-06-12 NOTE — TELEPHONE ENCOUNTER
----- Message from Janki Ivey sent at 10/7/2020  8:35 AM CDT -----  Regarding: PMR  ,  I have Nino scheduled [facility] B[facility].I did call him and also spoke with Ex-wife Imnai to inform of this appt. I will also send a note off to Cristian Shearer

## 2021-06-13 NOTE — TELEPHONE ENCOUNTER
[facility] Nurse Triage Telephone Note      Provider: ALYSHA Boswell  Facility: [facility]    Facility Type: TCU    Caller: Thao  Call Back Number:  [phone]    Allergies: Patient has no known allergies.    Reason for call: Refill of Oxycodone sent in for 5mg q 4 hr PRN. Pt previously was on 1-2 tabs q 4 hr PRN & pt upset.     Verbal Order/Direction given by Provider: We need to start tapering down, change to 7.5mg (1.5 tabs of 5mg) po q hr prn.    Provider giving order: ALYSHA Boswell    Verbal order given to: Thao Muro RN

## 2021-06-13 NOTE — PROGRESS NOTES
Code Status:  FULL CODE  Visit Type: Follow-up (pain)     Facility:  [facility] [[id number]]        Facility Type: SNF (Skilled Nursing Facility, TCU)    History of Present Illness: Nino Stephen is a 60 y.o. male with a past medical history for OA, cerebral aneurysm, right hemiparesis, depression, HLD, tobacco dependence. He had same day surgery yesterday for left wrist OA.  He underwent a left wrist four corner fusion with E-Z clip staples and left scaphoid excision. He required TCU admission as he is unable to use his left hand due to surgery and he has a contracted right hand 2/2 stroke.      He did complete therapy last week however, due to needing help at home secondary to right-sided hemiparesis and unable to weight-bear on the left arm he will be remaining in the facility for nursing assistance only.  Once his weightbearing status has lifted then he will resume physical therapy.   Good CMS to left arm, good radial pulse.    Review of Systems   Patient denies fever, chills, headache, lightheadedness, dizziness, rhinorrhea, cough, congestion, shortness of breath, chest pain, palpitations, abdominal pain, n/v, diarrhea, constipation, change in appetite, dysuria, frequency, burning or pain with urination.  Other than stated in HPI all other review of systems is negative.         Physical Exam  In order to maintain social distancing during the COVID pandemic, a visual exam was completed.     Vitals:    11/16/20 0940   BP: 121/61   Pulse: 75   Resp: 17   Temp: 98.4  F (36.9  C)   SpO2: 94%      Patient is well nourished and no acute distress.  Head is AT/NC, EOM intact. Respiratory effort normal. No visible LE edema. Left hand with good CMS, minimal edema. Alert and oriented, right sided hemiparesis, aphasia, face symmetric. No visible skin issues or rashes. Mood euthymic      Labs:  No results found for this or any previous visit (from the past 240 hour(s)).    Assessment:  1. Pain     2.  Pseudarthrosis after fusion or arthrodesis     3. Ankylosis of left wrist     4. Hemiplegia of dominant side as late effect following cerebrovascular disease (H)         Plan:   Pain; counseled patient on efforts to wean oxycodone use.  Will add ibuprofen as needed along with scheduled APAP.  Counseled patient to use those medications before using oxycodone.    Continue with nursing as all other clinical conditions are stable.  We will plan to only see for acute issues and discharge.        Electronically signed by: Edna Hua CNP

## 2021-06-13 NOTE — PROGRESS NOTES
Code Status:  FULL CODE  Visit Type: Discharge Summary     Facility:  [facility] [[id number]]          PCP:  Marlo Valdez MD  [phone]       Admission Date to our Facility: 10/28/2020  Discharge Date from our Facility: 11/14/2020    Discharge Diagnosis:    1. 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 Ankylosis of left wrist     3. Hemiplegia of dominant side as late effect following cerebrovascular disease (H)     4. Weakness     5. Primary osteoarthritis of left wrist     6. History of CVA (cerebrovascular accident)          History of Present Illness: Nino Stephen is a 60 y.o. male with a past medical history for OA, cerebral aneurysm, right hemiparesis, depression, HLD, tobacco dependence. He had same day surgery yesterday for left wrist OA.  He underwent a left wrist four corner fusion with E-Z clip staples and left scaphoid excision. He required TCU admission as he is unable to use his left hand due to surgery and he has a contracted right hand 2/2 stroke.      Skilled Nursing Facility Course:  While at the TCU, he was able to work with therapy to use a platform walker for ambulation due to NWB status on left arm and right sided hemiparesis.      Last week he felt that the ritalin three times a day was too stimulating and causing him issues at bedtime and so I adjusted this to two times a day.  He reports that this is better.      Tobacco dependence: he has abstained from smoking during his stay.  He continues with nicoderm.     He was seen by Orthopedics earlier this week and hard cast was removed and he was put in a splint. Orthopedics is recommending he continue with TCU therapy due to living alone.  SW is attempting to get further authorization.  He is allowed to due therapy but remains NWB. Pain is stable with APAP and prn oxycodone.  He is not wanting to take APAP scheduled however I did  him on using the APAP consistently to maintain a better pain threshold in order to  wean down on the oxycodone.          Discharge Medications:    Current Outpatient Medications   Medication Sig Dispense Refill     acetaminophen (TYLENOL) 500 MG tablet Take 1,000 mg by mouth 3 (three) times a day.        baclofen (LIORESAL) 10 MG tablet Take 10 mg by mouth 3 (three) times a day.       divalproex (DEPAKOTE DR) 500 MG 12 hour tablet TAKE 2 TABLETS BY MOUTH TWICE A  tablet 2     ibuprofen (ADVIL,MOTRIN) 800 MG tablet Take 800 mg by mouth daily.       methylphenidate HCl (RITALIN) 10 MG tablet 1 po tid (Patient taking differently: 10 mg 2 (two) times a day. ) 90 tablet 0     nicotine (NICODERM CQ) 14 mg/24 hr Place 1 patch on the skin daily.       oxyCODONE (ROXICODONE) 5 MG immediate release tablet Take 1-1.5 tablets (5-7.5 mg total) by mouth every 4 (four) hours as needed for pain. Take 5mg for pain 3-6/10 and 7.5mg for pain 7-10/10 45 tablet 0     senna-docusate (SENNOSIDES-DOCUSATE SODIUM) 8.6-50 mg tablet Take 1 tablet by mouth 2 (two) times a day.       venlafaxine (EFFEXOR-XR) 75 MG 24 hr capsule TAKE 3 CAPSULES (225 MG TOTAL) BY MOUTH DAILY. 270 capsule 0     zolpidem (AMBIEN) 5 MG tablet Take 1 tablet (5 mg total) by mouth at bedtime as needed for sleep. (Patient taking differently: Take 5 mg by mouth at bedtime. ) 30 tablet 0     No current facility-administered medications for this visit.        For most current and accurate medication list, please contact the skilled nursing facility that this patient visit took place at.      Discharge Plan:  Patient is stable to discharge home but will need assistance in his home for ADLs.  He will need to follow up with PCP and orthopedics.     Review of Systems   Patient denies fever, chills, headache, lightheadedness, dizziness, rhinorrhea, cough, congestion, shortness of breath, chest pain, palpitations, abdominal pain, n/v, diarrhea, constipation, change in appetite, dysuria, frequency, burning or pain with urination.  Other than stated in HPI  all other review of systems is negative.         Physical Exam   In order to maintain social distancing during the COVID pandemic, a visual exam was completed.     Vitals:    11/12/20 0929   BP: 110/62   Pulse: 78   Resp: 16   Temp: 98.4  F (36.9  C)   SpO2: 94%        Patient is well nourished and no acute distress.  Head is AT/NC, EOM intact. Respiratory effort normal. No visible LE edema. Left hand with good CMS, minimal edema. Alert and oriented, right sided hemiparesis, aphasia, face symmetric. No visible skin issues or rashes. Mood euthymic      Labs:  No results found for this or any previous visit (from the past 240 hour(s)).      Assessment:  1. 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 Ankylosis of left wrist     3. Hemiplegia of dominant side as late effect following cerebrovascular disease (H)     4. Weakness     5. Primary osteoarthritis of left wrist     6. History of CVA (cerebrovascular accident)         MEDICAL EQUIPMENT NEEDS:  NA      DISCHARGE PLAN/FACE TO FACE:  I certify that services are/were furnished while this patient was under the care of a physician and that a physician or an allowed non-physician practitioner (NPP), had a face-to-face encounter that meets the physician face-to-face encounter requirements. The encounter was in whole, or in part, related to the primary reason for home health. The patient is confined to his/her home and needs intermittent skilled nursing, physical therapy, speech-language pathology, or the continued need for occupational therapy. A plan of care has been established by a physician and is periodically reviewed by a physician.    I certify that this patient is under my care and that I, or a nurse practitioner or physician's assistant working with me, had a face-to-face encounter that meets the physician face-to-face encounter requirements with this patient.   Date of Face-to-Face Encounter: 11/12/2020    I certify that, based on my findings, the  following services are medically necessary home health services: RN, PT/OT    My clinical findings support the need for the above skilled services because: RN for medication management, PT/OT for strength and endurance training.     This patient is homebound because: Patient requires maximum effort in order to get out into the community on a regular basis.    The patient is, or has been, under my care and I have initiated the establishment of the plan of care. This patient will be followed by a physician who will periodically review the plan of care.      Electronically signed by: Edna Hua CNP

## 2021-06-13 NOTE — PROGRESS NOTES
Code Status:  FULL CODE  Visit Type: Follow-up (Pain)     Facility:  [facility] [[id number]]        Facility Type: SNF (Skilled Nursing Facility, TCU)    History of Present Illness: Nino Stephen is a 60 y.o. male with a past medical history for OA, cerebral aneurysm, right hemiparesis, depression, HLD, tobacco dependence. He had same day surgery yesterday for left wrist OA.  He underwent a left wrist four corner fusion with E-Z clip staples and left scaphoid excision. He required TCU admission as he is unable to use his left hand due to surgery and he has a contracted right hand 2/2 stroke.      He has not been having any therapy in the past month due to plateaued as he is nonweightbearing of his left wrist.  He does require to weight-bear in order to use a walker due to right-sided hemiparesis.  PT was restarted over the weekend due to increased weakness and fatigue secondary to isolation in the facility.  His clinical status has been stable without any issues.  I did discuss pain with him and he is using oxycodone approximately 1 time every couple days.    Review of Systems   Patient denies fever, chills, headache, lightheadedness, dizziness, rhinorrhea, cough, congestion, shortness of breath, chest pain, palpitations, abdominal pain, n/v, diarrhea, constipation, change in appetite, dysuria, frequency, burning or pain with urination.  Other than stated in HPI all other review of systems is negative.         Physical Exam  In order to maintain social distancing during the COVID pandemic, a visual exam was completed.     Vitals:    12/14/20 1247   BP: 122/86   Pulse: 90   Resp: 16   Temp: 98.4  F (36.9  C)   SpO2: 92%      Patient is well nourished and no acute distress.  Head is AT/NC, EOM intact. Respiratory effort normal. No visible LE edema. Left hand with good CMS, and hard splint. Alert and oriented, right sided hemiparesis, aphasia, face symmetric. No visible skin issues or rashes. Mood  euthymic      Labs:  No results found for this or any previous visit (from the past 240 hour(s)).    Assessment:  1. 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 Pain     3. Primary osteoarthritis of left wrist     4. Hemiplegia affecting right dominant side, unspecified etiology, unspecified hemiplegia type (H)         Plan:     Pseudoarthrosis after fusion: We will follow up with orthopedics on 12/22 in hopes of increasing weightbearing status and able to resume therapy with the goal to discharge to home independent.    Pain; discussed plan to wean oxycodone.  Patient states he does not like to use ibuprofen and so this was discontinued.  I have also changed his APAP to as needed and will change his oxycodone to twice daily as needed.  Urged patient to use APAP over oxycodone for pain management.  We will keep oxycodone at this time as he may restart physical therapy next week.        Electronically signed by: Edna Hua CNP

## 2021-06-13 NOTE — PROGRESS NOTES
Medical Care for Seniors/ Geriatrics    Facility:  [facility] [[id number]]    Code Status:  FULL CODE    Chief Complaint   Patient presents with     Review Of Multiple Medical Conditions   :                    Patient Active Problem List   Diagnosis     Aneurysm Of The Basilar Tip     Major Depression, Single Episode     Chronic Cutaneous Ulcer Venous Stasis     Contusion Of The Right Chest Wall With Intact Skin Surface     Hyperlipidemia     Hemiparesis     Nicotine Dependence     Inguinal Hernia     Aphasia due to late effects of cerebrovascular disease     Cognitive deficits as late effect of cerebrovascular disease     Abnormality of gait     Fall     Hemiplegia of dominant side as late effect following cerebrovascular disease (H)     Hemothorax, right     Multiple rib fractures     Muscle spasm     Pneumothorax on right     Subarachnoid hemorrhage (H)     Right pulmonary contusion     Arthritis of left wrist       History:  Nino Stephen  is a 60 year old male with history of 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    Hospital Course: Patient was admitted to same-day surgery and discharged on the same day, October 28.  He has severe left wrist arthritis and underwent 4 corner fusion with scaphoidectomy with Dr. Pate.  He required planned TCU admission postoperatively due to his right hemiparesis, left wrist surgery.  He lives alone.  Postoperative pain managed by ibuprofen, Dilaudid, Tylenol, Vistaril    Subjective/ROS:    -augmented by discussion with facility staff involved in direct care    -Patient reports things are going relatively well.  He is working on the strength of his hand.  He had his follow-up with Dr. Pate and says that his pinch  was at 50 pounds with an ultimate goal of 100  pounds.  He is satisfied with his progress.  -Patient continues to be on oxycodone for pain control.  Ms. Hua has talked to him and that they are beginning to wean.  She had ibuprofen in hopes of completing that wean.  I support that effort, its been over a month since surgery now.  -Patient requests increase in activity.  The Covid quarantine is very difficult considering the duration has been here.  He has not been able to use the gym.  He is not getting formal physical therapy right now.  He is doing some of his own exercises in the room.  He does not have any Therabands or other equipment.  -Patient otherwise feels well no fever sweats or chills falls injuries.  No difficulty with bowel movements no bladder complaints.  He is sleeping adequately.  Remainder negative    Past Medical History:   Diagnosis Date     Depression      Hemiparesis as late effect of cerebral aneurysm (H)     right side     Hyperlipidemia      Inguinal hernia     bilateral     Seizures (H)      Stroke (H)     brain aneurysm     Past Surgical History:   Procedure Laterality Date     BRAIN SURGERY      ruptured aneurysm with coil          Family History   Problem Relation Age of Onset     No Medical Problems Mother      No Medical Problems Father    :   Family history: As opposed to the above there is a family history of leukemia and neurofibromatosis    Social History     Socioeconomic History     Marital status: Patient Declined     Spouse name: Not on file     Number of children: Not on file     Years of education: Not on file     Highest education level: Not on file   Occupational History     Not on file   Social Needs     Financial resource strain: Not on file     Food insecurity     Worry: Not on file     Inability: Not on file     Transportation needs     Medical: Not on file     Non-medical: Not on file   Tobacco Use     Smoking status: Former Smoker     Types: Cigarettes     Smokeless tobacco: Never Used   Substance and Sexual  Activity     Alcohol use: No     Drug use: No     Sexual activity: Not on file   Lifestyle     Physical activity     Days per week: Not on file     Minutes per session: Not on file     Stress: Not on file   Relationships     Social connections     Talks on phone: Not on file     Gets together: Not on file     Attends Episcopal service: Not on file     Active member of club or organization: Not on file     Attends meetings of clubs or organizations: Not on file     Relationship status: Not on file     Intimate partner violence     Fear of current or ex partner: Not on file     Emotionally abused: Not on file     Physically abused: Not on file     Forced sexual activity: Not on file   Other Topics Concern     Not on file   Social History Narrative     Not on file   :    Patient says he lives in [place] in a handicapped accessible apartment.  He lives alone.  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  More recently he has worked in [employer].  He says he has a wheel in his bathroom.    Current Outpatient Medications on File Prior to Visit   Medication Sig Dispense Refill     acetaminophen (TYLENOL) 500 MG tablet Take 1,000 mg by mouth 3 (three) times a day.        baclofen (LIORESAL) 10 MG tablet Take 10 mg by mouth 3 (three) times a day.       buPROPion (WELLBUTRIN XL) 150 MG 24 hr tablet Take 150 mg by mouth daily.       divalproex (DEPAKOTE DR) 500 MG 12 hour tablet TAKE 2 TABLETS BY MOUTH TWICE A  tablet 2     ibuprofen (ADVIL,MOTRIN) 800 MG tablet Take 800 mg by mouth daily.       methylphenidate HCl (RITALIN) 10 MG tablet Take 1 tablet (10 mg total) by mouth 2 (two) times a day. 60 tablet 0     nicotine (NICODERM CQ) 14 mg/24 hr Place 1 patch on the skin daily.       oxyCODONE (ROXICODONE) 5 MG immediate release tablet Take 1-1.5 tablets (5-7.5 mg total) by mouth every 4 (four) hours as needed for pain. Take 5mg for pain 3-6/10  and 7.5mg for pain 7-10/10 45 tablet 0     senna-docusate (SENNOSIDES-DOCUSATE SODIUM) 8.6-50 mg tablet Take 1 tablet by mouth 2 (two) times a day.       venlafaxine (EFFEXOR-XR) 75 MG 24 hr capsule TAKE 3 CAPSULES (225 MG TOTAL) BY MOUTH DAILY. 270 capsule 0     zolpidem (AMBIEN) 5 MG tablet Take 1 tablet (5 mg total) by mouth at bedtime as needed for sleep. (Patient taking differently: Take 5 mg by mouth at bedtime. ) 30 tablet 0     No current facility-administered medications on file prior to visit.    :      ALLERGIES:  Patient has no known allergies.    Vitals: Blood pressure 131/90 respirations 18 temperature 98.2 heart rate 75 O2 sats 95% weight is 185 pounds      Physical exam:    Patient is alert he is oriented x3 he is pleasant conversant.  He is in good spirits with joking.  He appears comfortable without accessory muscle use or tachypnea.  He is mobile about his room in his wheelchair.  He points out he has adapted walker.  He also is able to walk with a cane in his AFO.  He says that is what he uses to get around his room when he is doing his workouts.  His left wrist remains in a splint, the cast is gone.  He demonstrates his pinch strength.  Skin is good visualized portions.  Appears nonedematous.        Due to the 2020 Covid 19 pandemic, except as noted above, the patient was visually observed at a 6 foot plus distance.  An observational exam was performed in an effort to keep patient safe from Covid 19 and other communicable diseases.   Labs:  Lab Results   Component Value Date    WBC 6.6 11/02/2020    HGB 12.8 (L) 11/02/2020    HCT 39.3 (L) 11/02/2020    MCV 94 11/02/2020     11/02/2020     Results for orders placed or performed in visit on 01/30/17   Basic Metabolic Panel   Result Value Ref Range    Sodium 139 136 - 145 mmol/L    Potassium 4.5 3.5 - 5.0 mmol/L    Chloride 104 98 - 107 mmol/L    CO2 25 22 - 31 mmol/L    Anion Gap, Calculation 10 5 - 18 mmol/L    Glucose 93 70 - 125 mg/dL     Calcium 9.4 8.5 - 10.5 mg/dL    BUN 21 8 - 22 mg/dL    Creatinine 0.83 0.70 - 1.30 mg/dL    GFR MDRD Af Amer >60 >60 mL/min/1.73m2    GFR MDRD Non Af Amer >60 >60 mL/min/1.73m2         No results found for: TSH  No results found for: HGBA1C  [unfilled]  No results found for: FHGKIYNZ86  No results found for: BNP  [unfilled]  Most Recent EKG     Units 10/22/20  1357   VENTRATE BPM 77   ATRIALRATE BPM 77   QRSDURATION ms 82   QTINTERVAL ms 356   QTCCALC ms 402   P [place] degrees 60   RAXIS degrees 46   TAXIS degrees 54   MUSEDX  Normal sinus rhythm  Cannot rule out Anterior infarct , age undetermined  Abnormal ECG  No previous ECGs available  Confirmed by KAYLAH SHELTON MD LOC:[facility] ([id number]) on 10/22/2020 2:06:17 PM           Assessment/Plan:      ICD-10-CM    1. Hemiplegia affecting right dominant side, unspecified etiology, unspecified hemiplegia type (H)  G81.91    2. Nicotine Dependence  F17.200    3. Arthritis of left wrist  M19.032        Left wrist arthritis  Status post 4 corner fusion with scaphoidectomy October 20, 2020 with Dr. Rhodes   Patient has continued to do well.  -Surgical follow-up has occurred,.  He has been changed to a splint.  Some lessening of his restrictions.  PT, OT, on hold  -Wean opiates as able  -Has bowel program in place if need be    History of subarachnoid hemorrhage with aneurysm rupture/coiling  Right hemiplegia  Aphasia   Patient unable to live independently until he has fewer restrictions and greater function in his left upper extremity.    Depression   Venlafaxine, bupropion continued without change.  He is on bupropion despite his history of the arachnoid hemorrhage and remote seizure.  Neurologist is aware of his bupropion use.  No changes have been made.    Nicotine dependence   Nicotine patch in place    Insomnia   Zolpidem    Ritalin use   Longstanding per neurology following his CVA.    Seizure disorder   No seizure in many many years but remains on Keppra per neurology.   Bupropion continues per his outpatient medical team.      Case discussed with:    Facility staff         Shine Matt MD

## 2021-06-13 NOTE — PROGRESS NOTES
Clinic Care Coordination Contact    Situation: Patient chart reviewed by care coordinator.    Background: Patient was moved to Maintenance by the  on 11/3/20    Assessment: CHW will reach out in two months per standard outreach for Maintenance    Plan/Recommendations: Follow up in two weeks    Next Outreach: 1/4/21

## 2021-06-13 NOTE — PROGRESS NOTES
Code Status:  FULL CODE  Visit Type: Follow Up (pain, rehab)     Facility:  [facility] [[id number]]        Facility Type: SNF (Skilled Nursing Facility, TCU)    History of Present Illness: Nino Stephen is a 60 y.o. male with a past medical history for OA, cerebral aneurysm, right hemiparesis, depression, HLD, tobacco dependence. He had same day surgery yesterday for left wrist OA.  He underwent a left wrist four corner fusion with E-Z clip staples and left scaphoid excision. He required TCU admission as he is unable to use his left hand due to surgery and he has a contracted right hand 2/2 stroke.      He is now able to bear light weight through wrist and so he was picked back up by OT/PT last week.  He reports his pain is minimal and he has adjusted to oxycodone changes last week.  He reports a discomfort in his left back shoulder and states its like a tingling discomfort down his arm.  Shoulder muscles are soft without spasm.  No increased pain with ROM.     He will be seen by Orthopedics on 12/22 and plan to discharge to home on 12/29.    Review of Systems   Patient denies fever, chills, headache, lightheadedness, dizziness, rhinorrhea, cough, congestion, shortness of breath, chest pain, palpitations, abdominal pain, n/v, diarrhea, constipation, change in appetite, dysuria, frequency, burning or pain with urination.  Other than stated in HPI all other review of systems is negative.         Physical Exam  In order to maintain social distancing during the COVID pandemic, a visual exam was completed.     Vitals:    12/21/20 0857   BP: 121/77   Pulse: 81   Resp: 16   Temp: 98.5  F (36.9  C)   SpO2: 93%      Patient is well nourished and no acute distress.  Head is AT/NC, EOM intact. Respiratory effort normal. No visible LE edema. Left hand with good CMS, and hard splint. Alert and oriented, right sided hemiparesis, aphasia, face symmetric. No visible skin issues or rashes. Mood euthymic      Labs:  No  results found for this or any previous visit (from the past 240 hour(s)).    Assessment:  1. 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 Pain     3. Primary osteoarthritis of left wrist     4. Pinched nerve in shoulder, left         Plan:     Pseudoarthrosis after fusion: We will follow up with orthopedics on 12/22 in hopes of increasing weightbearing status and able to resume therapy with the goal to discharge to home independent.    Pain; again discussed weaning his oxycodone.  He states he does not want to decrease it more yet.  I counseled him on longterm use of oxycodone.  I stated that I would leave it for this week and plan to wean back to 2.5mg next week and get completely off in 2 weeks.  He is agreeable.  Urged patient to use APAP for pain.      Pinched nerve: counseled him on options.  Updated OT and they will consider TENS and stretches.       Electronically signed by: Edna Hua, ELIZABETH

## 2021-06-14 ENCOUNTER — RECORDS - HEALTHEAST (OUTPATIENT)
Dept: ADMINISTRATIVE | Facility: OTHER | Age: 61
End: 2021-06-14

## 2021-06-14 NOTE — TELEPHONE ENCOUNTER
Pt called back and per pt confirmed that he uses [facility] and [facility] and not [facility]. Pt's pharmacies has been updated in the chart.

## 2021-06-14 NOTE — TELEPHONE ENCOUNTER
New Appointment Needed  What is the reason for the visit:    Inpatient/ED Follow Up Appt Request  At what hospital or facility were you seen?: [facility]   What is the reason you were seen?: left wrist fusion   What date were you admitted?: date: 10.28.20  What date were you discharged?: date: 01.01.2021  What was the recommended timeframe for your follow up appointment?: 7 days  Provider Preference: PCP or anybody available  How soon do you need to be seen?: within the time frame  Waitlist offered?: No  Okay to leave a detailed message:  Yes     If calling back call Graham. If call is returned after today call the patient

## 2021-06-14 NOTE — PROGRESS NOTES
Medical Care for Seniors Patient Outreach:     Discharge Date::  1/1/21      Reason for TCU stay (discharge diagnosis)::  Pseudoarthrosis, ankylosis of the left wrist      Are you feeling better, the same or worse since your discharge?:  Patient is feeling better          As part of your discharge plan, did they discuss home care with you?: Yes        Have your seen them yet, or are they scheduled to visit?: Yes                Do you have any follow up visits scheduled with your PCP or Specialist?:  No          I'm glad to hear you're doing well and we want you to continue to do well. Your PCP would like to see you for a follow-up visit. Can we help set that up for your today?: No        (RN) Provided patient the PCP's phone number to call if they have any questions or concerns?: No (Patient and his ex-wife will set up an appt with PCP.  )

## 2021-06-14 NOTE — TELEPHONE ENCOUNTER
Reason for Call:  Other prescription     Detailed comments: transferring medications to [place]  [phone] [address] [facility] [address]    Not using [employer]     Phone Number Patient can be reached at: Other phone number:  [phone]    Best Time: as soon as possible     Can we leave a detailed message on this number?: No call back needed    Call taken on 1/19/2021 at 2:06 PM by Janki Ivey

## 2021-06-14 NOTE — PROGRESS NOTES
Nino Stephen is a 60 y.o. male who is being evaluated via a billable video visit.      How would you like to obtain your AVS? Mail a copy.  If dropped from the video visit, the video invitation should be resent by: Text to cell phone: [phone]  Will anyone else be joining your video visit? No      Video Start Time:   Assessment & Plan     Nino was seen today for hospital visit follow up.    Diagnoses and all orders for this visit:    Wrist pain, chronic, left    Aneurysm Of The Basilar Tip    Major depressive disorder with single episode, remission status unspecified    Hyperlipidemia, unspecified hyperlipidemia type    Arthritis of left wrist    Hemiplegia affecting right dominant side, unspecified etiology, unspecified hemiplegia type (H)      Patient had a TCU discharge follow-up.  I reviewed all his medications.    His wrist has improved and pain has lessened greatly.  He can use some Tylenol 500 mg 2 3 times daily as needed.    He has had previous right hemiplegia from a ruptured aneurysm of the basilar tip.  Unchanged    He sees neurology and does take methylphenidate presently on 10 mg twice a day.    Insomnia we will continue on the zolpidem 5 mg a day.    Spasticity from the previous aneurysm burst continue on the baclofen.  Also on Depakote for seizure prevention    Depression meds were reviewed at this time he seems to be doing well will stay on the venlafaxine and bupropion.    Encouraged him to quit vaping.    We will follow-up for a face-to-face visit in April and check Depakote level     Review of external notes as documented above , As well as in the subjective below.  Reviewed the nurse practitioner at the TCU discharge summary    Reviewed the hand surgeons surgery notes.        27 minutes spent on the date of the encounter doing chart review, history and exam, documentation and further activities as noted above         BMI:   Estimated body mass index is 25.25 kg/m  as calculated from  the following:    Height as of 12/28/20: 6' (1.829 m).    Weight as of 12/28/20: 186 lb 3.2 oz (84.5 kg).         No follow-ups on file.    Marlo Valdez MD  [facility]    Subjective     Nino Stephen is 60 y.o. and presents to clinic today for the following health issues   HPI   This patient had a virtual visit, video, due to the coronavirus pandemic.    This patient had surgery for left wrist, pseudoarthrosis after joint fusion.  This was at the end of October.    He was in a TCU for 2 months because he has had previous right hemiplegia from an aneurysm burst.    He had a surgery on his left wrist.    He made slow but steady gains he continues to follow-up with the orthopedist Dr. Pate.    In the TCU I did review his evaluation and discharge summary from nurse practitioner, Edna Hua.  Patient had weaned way down on the oxycodone and was given instructions how to wean off he is now done with that.    He continues on baclofen 3 times a day methylphenidate twice a day Depakote 500 mg 2 twice a day venlafaxine 75 mg 3 a day bupropion 150 mg once a day and zolpidem 5 mg at bedtime.    Patient is now at home.  He will follow up again with the hand surgeon.    Patient had PT at the TCU and now a nurse will be coming in for additional PT as well as skilled nursing med set up etc.    He still vaping I encouraged him to quit that altogether.    Patient will schedule for a follow-up face-to-face visit with me in April.    No additional concerns or issues at this time    His right hemiplegia is unchanged    Incision is healed well regarding his wrist  Review of Systems  10 point review of systems positive as outlined above otherwise negative, he denies any COVID-19 symptoms or exposure      Objective       Vitals:  No vitals were obtained today due to virtual visit.    Physical Exam  General appearance no acute distress    HEENT no nasal drainage no congestion    No sore throat    Lungs:  Nonlabored breathing no wheezing.    Heart: No palpitations or rapid heart rate    Neurologically has ongoing weakness with some contracture on the right arm and rest, weakness on the right leg as well unchanged.    Abdomen nontender.  No bloating    Skin is normal no rashes    Extremities he does have healing of the incision from the left wrist.  No significant swelling or redness.    No peripheral edema.            Video-Visit Details    Type of service:  Video Visit    Video End Time (time video stopped): 2:50 PM  Originating Location (pt. Location): Home    Distant Location (provider location):  [facility]     Platform used for Video Visit: DanielleParsely

## 2021-06-14 NOTE — TELEPHONE ENCOUNTER
Nurse Sofia from [facility]   Verbal orders requested: Start of care and 1, 2x a week  for 1 week, 1x a week for 7 weeks. PT eval and treat, HA service 1x a week.     For: Medication set and patient education.  Call back number  [phone]  Okay to leave detail message.

## 2021-06-14 NOTE — TELEPHONE ENCOUNTER
Reason for Call:  Medication or medication refill: refill meds and changed pharmacy to [place] they will call to transfer    Do you use a [facility]?  Name of the pharmacy and phone number for the current request: no    Name of the medication requested: divaltroex, bupropion and ambien    Other request: yes dc'd from home care per janique    Can we leave a detailed message on this number? No call back needed    Phone number patient can be reached at: Other phone number:  [phone]*    Best Time: anytime    Call taken on 1/13/2021 at 2:11 PM by Rosa Torres

## 2021-06-14 NOTE — PROGRESS NOTES
Clinic Care Coordination Contact    Situation: Patient chart reviewed by care coordinator.    Background: SW completed chart review    Assessment: Patient is still in maintenance, CHW did outreach, patient not yet comfortable to graduate from [facility] had just returned home from [facility]     Plan/Recommendations: CHW will outreach again and discuss graduation if no new needs

## 2021-06-14 NOTE — PROGRESS NOTES
Code Status:  FULL CODE  Visit Type: Discharge Summary     Facility:  [facility] [[id number]]          PCP:  Marlo Valdez MD  [phone]       Admission Date to our Facility: 10/28/2020 Discharge Date from our Facility: 1/1/2021    Discharge Diagnosis:    1. 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 Ankylosis of left wrist     3. Primary osteoarthritis of left wrist     4. Aneurysm Of The Basilar Tip  methylphenidate HCl (RITALIN) 10 MG tablet   5. Hemiplegia affecting right dominant side, unspecified etiology, unspecified hemiplegia type (H)  methylphenidate HCl (RITALIN) 10 MG tablet   6. Primary insomnia  zolpidem (AMBIEN) 5 MG tablet        History of Present Illness: Nino Stephen is a 60 y.o. male with a past medical history for OA, cerebral aneurysm, right hemiparesis, depression, HLD, tobacco dependence. He had same day surgery yesterday for left wrist OA.  He underwent a left wrist four corner fusion with E-Z clip staples and left scaphoid excision. He required TCU admission as he is unable to use his left hand due to surgery and he has a contracted right hand 2/2 stroke.    Skilled Nursing Facility Course:  His TCU progress was delayed due to nonweightbearing status of left wrist and right sided weakness from previous stroke.  This made it difficult to transfer or even propel wheelchair.  Last week he was seen by orthopedics and his weight bearing restrictions were lifted and he was worked with therapy to get to ambulating with a walker.     Patient continues intermittently using oxycodone.  I have counseled on how to wean off of the oxycodone in the next week.  He reports he doesn't plan on taking it much at home.     While at the TCU, he had complaints of the Ritalin keeping him awake at night and so this was changed to two times a day without issue.         Discharge Medications:    Current Outpatient Medications   Medication Sig  Dispense Refill     acetaminophen (TYLENOL) 500 MG tablet Take 1,000 mg by mouth 3 (three) times a day.        baclofen (LIORESAL) 10 MG tablet Take 10 mg by mouth 3 (three) times a day.       buPROPion (WELLBUTRIN XL) 150 MG 24 hr tablet Take 150 mg by mouth daily.       divalproex (DEPAKOTE DR) 500 MG 12 hour tablet TAKE 2 TABLETS BY MOUTH TWICE A  tablet 2     [START ON 1/1/2021] methylphenidate HCl (RITALIN) 10 MG tablet Take 1 tablet (10 mg total) by mouth 2 (two) times a day. 25 tablet 0     nicotine (NICODERM CQ) 14 mg/24 hr Place 1 patch on the skin daily.       [START ON 1/1/2021] oxyCODONE (ROXICODONE) 5 MG immediate release tablet Take 1 tablet (5 mg total) by mouth 2 (two) times a day as needed for pain. Take 5mg for pain 3-6/10 and 7.5mg for pain 7-10/10 8 tablet 0     senna-docusate (SENNOSIDES-DOCUSATE SODIUM) 8.6-50 mg tablet Take 1 tablet by mouth 2 (two) times a day.       venlafaxine (EFFEXOR-XR) 75 MG 24 hr capsule TAKE 3 CAPSULES (225 MG TOTAL) BY MOUTH DAILY. 270 capsule 0     [START ON 1/1/2021] zolpidem (AMBIEN) 5 MG tablet Take 1 tablet (5 mg total) by mouth at bedtime. 10 tablet 0     No current facility-administered medications for this visit.        For most current and accurate medication list, please contact the skilled nursing facility that this patient visit took place at.      Discharge Plan:  Stable to discharge to home with home care services.  Follow up with orthopedics.  Follow up with PCP in the next 2 weeks.     Review of Systems   Patient denies fever, chills, headache, lightheadedness, dizziness, rhinorrhea, cough, congestion, shortness of breath, chest pain, palpitations, abdominal pain, n/v, diarrhea, constipation, change in appetite, dysuria, frequency, burning or pain with urination.  Other than stated in HPI all other review of systems is negative.         Physical Exam   In order to maintain social distancing during the COVID pandemic, a visual exam was  completed.     Vitals:    12/28/20 0925   BP: 119/75   Pulse: 97   Resp: 18   Temp: 98  F (36.7  C)   SpO2: 96%        Patient is well nourished and no acute distress.  Head is AT/NC, EOM intact. Respiratory effort normal. No visible LE edema. Left hand with good CMS, and hard splint. Alert and oriented, right sided hemiparesis, aphasia, face symmetric. No visible skin issues or rashes. Mood euthymic      Labs: No results found for this or any previous visit (from the past 240 hour(s)).      Assessment:  1. 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 Ankylosis of left wrist     3. Primary osteoarthritis of left wrist     4. Aneurysm Of The Basilar Tip  methylphenidate HCl (RITALIN) 10 MG tablet   5. Hemiplegia affecting right dominant side, unspecified etiology, unspecified hemiplegia type (H)  methylphenidate HCl (RITALIN) 10 MG tablet   6. Primary insomnia  zolpidem (AMBIEN) 5 MG tablet       MEDICAL EQUIPMENT NEEDS:  NA      DISCHARGE PLAN/FACE TO FACE:  I certify that services are/were furnished while this patient was under the care of a physician and that a physician or an allowed non-physician practitioner (NPP), had a face-to-face encounter that meets the physician face-to-face encounter requirements. The encounter was in whole, or in part, related to the primary reason for home health. The patient is confined to his/her home and needs intermittent skilled nursing, physical therapy, speech-language pathology, or the continued need for occupational therapy. A plan of care has been established by a physician and is periodically reviewed by a physician.    I certify that this patient is under my care and that I, or a nurse practitioner or physician's assistant working with me, had a face-to-face encounter that meets the physician face-to-face encounter requirements with this patient.   Date of Face-to-Face Encounter: 12/28/2020    I certify that, based on my  findings, the following services are medically necessary home health services: RN, PT/OT    My clinical findings support the need for the above skilled services because: RN for medication management, PT/OT for ongoing strengthening and endurance.     This patient is homebound because: he requires maximum effort in order to get out into the community on a regular basis.     The patient is, or has been, under my care and I have initiated the establishment of the plan of care. This patient will be followed by a physician who will periodically review the plan of care.        Electronically signed by: Edna Hua CNP

## 2021-06-14 NOTE — TELEPHONE ENCOUNTER
Reason for Call: Request for an order or referral:    Order or referral being requested: Verbal Order    Date needed: as soon as possible    Has the patient been seen by the PCP for this problem? YES    Additional comments: [facility] called and would like verbal orders.  Patient needs to be discharged from home care agency so that he may be picked up by another home care agency as they do not provider OT services.    Phone number Patient can be reached at:  Other phone number:  [phone]    Best Time:  any    Can we leave a detailed message on this number?  Yes    Call taken on 1/28/2021 at 11:59 AM by Yue Truong

## 2021-06-14 NOTE — TELEPHONE ENCOUNTER
Reason for Call: Request for an order or referral:    Order or referral being requested: Verbal Orders    Date needed: as soon as possible    Has the patient been seen by the PCP for this problem? YES    Additional comments: Nyasia from [facility] called needing verbal orders for, Skilled Nursing, 1x /4 weeks, 3 times as needed, PT/OT/Speech to Re-eval and treat. Please call with verbal orders.    Phone number Patient can be reached at:  Other phone number:  [phone]    Best Time:  any    Can we leave a detailed message on this number?  Yes    Call taken on 2/3/2021 at 12:31 PM by Yue Truong

## 2021-06-14 NOTE — TELEPHONE ENCOUNTER
Called and informed of okay for orders. Called ex wife and scheduled for virtual visit this Thursday

## 2021-06-14 NOTE — TELEPHONE ENCOUNTER
Reason for Call: Request for an order or referral:    Order or referral being requested: Verbal Orders    Date needed: as soon as possible    Has the patient been seen by the PCP for this problem? YES    Additional comments: Cee from [facility] called, would like Verbal orders for:  Updated start of care order. Please call her with the verbal orders.    Phone number Patient can be reached at:  Other phone number:  [phone]    Best Time:  any    Can we leave a detailed message on this number?  Yes    Call taken on 2/1/2021 at 10:20 AM by Yue Truong

## 2021-06-14 NOTE — TELEPHONE ENCOUNTER
Reason for Call:  Medication or medication refill:  Barbara calling from [facility]   change of pharmacy.. all rx needs to be transferred to [facility]    Do you use a [facility]?  Name of the pharmacy and phone number for the current request: no [facility]    Name of the medication requested: all 3  Didn't give me rx    Other request: asap     Can we leave a detailed message on this number? Yes    Phone number patient can be reached at: Other phone number:  [phone]    Best Time:     Call taken on 1/14/2021 at 1:29 PM by Rosa Torres

## 2021-06-14 NOTE — TELEPHONE ENCOUNTER
We received a fax from [facility] saying that they don not have a Nino Stephen active in their system at [facility] and the medication listed on the fax was for Zolpidem. I called the patient and was going to have him confirm his pharmacy but per pt said he is in the middle of 3 things and that he will call us back.    Ok to take message upon returned call:    We need to know if he uses [facility] and if not, where would he like us to resend his medication for Zolpidem.

## 2021-06-14 NOTE — PROGRESS NOTES
"Clinic Care Coordination Contact:    Community Health Worker Follow Up    Goals:   Goals Addressed    None       Discussion: The CHW was able to connect with the pt today. Introduced self. Explain reason of called. CHW phone number is given to the patient. Verified new goal, most and urgent needs. Pt confirmed no new goal at this time. CHW explain [facility] graduation. CHW will connect with the pt in one month per patient request and plan to verify new goal, assist follow up appt scheduling with pcp for pt (if not yet schedule or complete by then), and discuss CCC graduation (if no new goal).     Patient stated;   -Have hand surgery and back home about 6 weeks now. Needs f/u appt with Dr. Valdez. My \"ex-wife assist my appointment for me. Going to see my ex-wife and talk to her about her availability time before schedule the follow up appt with Dr. Valdez.\" Don't feel comfortable to graduate from [facility] at this time.     Patient current enrollment with [facility]: maintenance per pt chart reviewed.    CHW Next Follow-up: one month per pt request following up discussion above.     CHW Plan: 2-.      "

## 2021-06-14 NOTE — TELEPHONE ENCOUNTER
Barbara from [facility] [phone] pt is currently out of his medications these are new to this pharmacy,they called [facility] to transfer to them.  Divalproex delayed release 500 mg 2 in the am 2 at bedtime  Bupropion xl 150 mg one in the morning  Zolpidem 5 mg at bedtime as needed  Please send to [facility]

## 2021-06-14 NOTE — TELEPHONE ENCOUNTER
Left message #1 at [phone]. Postponing task out to a week and will try again.     Not able to call Graham since their is no C2C with him.

## 2021-06-14 NOTE — TELEPHONE ENCOUNTER
Okay for orders, but I need to have this patient schedule a follow-up virtual visit.    There is another message on this patient regarding his discharge meds from the TCU.    I need to have the patient follow-up with me so I know exactly what medications he is taking.    Please have his ex-wife help him and have a video visit, like we did before his surgery

## 2021-06-14 NOTE — TELEPHONE ENCOUNTER
Spoke to Sofia from home care and she is asking for a verbal order to discontinue home health aide, per her, pt decided not to have one.     The pt needs a refill on some meds and those were set up, please authorize if ok.     Ok to leave a detail message when we call back to Sofia with the verbal order.

## 2021-06-15 NOTE — TELEPHONE ENCOUNTER
Nyasia calling back from [facility]  looking for orders   They will be going out to visit tomorrow  02/06 please OK orders today  Thank you

## 2021-06-15 NOTE — TELEPHONE ENCOUNTER
Rusty called, this medication is to be sent to [facility]. Please cancel out the RX that was sent to [facility] and resend this medication to [facility] in [place]. Please also update patient's primary pharmacy.

## 2021-06-15 NOTE — TELEPHONE ENCOUNTER
Reason for Call:  Home Health Care    joselito with [facility] called regarding (reason for call): ot verbal orders    Orders are needed for this patient. n/a    PT: n/a    OT: 2 times a week x 3    Skilled Nursing: n/a    Pt Provider: dr srivastava    Phone Number Homecare Nurse can be reached at: [phone]    Can we leave a detailed message on this number? Yes     Phone number patient can be reached at: Home number on file [phone] (home)    Best Time: any    Call taken on 3/11/2021 at 9:43 AM by Rosa Torres

## 2021-06-15 NOTE — TELEPHONE ENCOUNTER
An from [facility] home care calling  Needs verbal orders for ot 1 time a week for 1 week and 2 times a week for 2 weeks also ASL for cognative assesment and fall prevention

## 2021-06-15 NOTE — TELEPHONE ENCOUNTER
Cee supervisor of [facility] calling to please get orders for this patient as they are hoping to see him tomorrow.

## 2021-06-15 NOTE — PROGRESS NOTES
Clinic Care Coordination Contact:    Community Health Worker Follow Up    Goals:   Goals Addressed    None       Discussion: [facility] CHW was able to connect with the patient today. Verified new goal, need/urgent needs, follow up appt with pcp, and concerns at this time. Following up what hand surgery per pt reported at last CHW outreach (encounter 1/04/2021). Patient completed hospital discharge follow up visit with PCP on 1/12/2021 chart reviewed. Pt confirmed currently working with [facility] for skilled nursing and PTOT and no new goal, need, or urgent needs at this time.     Discussed and explain moving pt toward [facility] graduation. Pt is aware no further outreach by [facility] team if agreed. Pt agreed. Pt is aware message send to [facility] Care Lead Care Coordinator requesting chart review and if don't hear from [facility] team in the month of March 2021 it mean that he is graduated from [facility] program. Pt agreed. Refer pt to connect with PCP office for any questions and to re-refer to [facility] service. Pt confirmed understand.     Patient reported:   -Hand surgery went pretty well. Already follow up with Dr. Valdez. Med prescribed treating pain. No concerns. Thank you for calling.    CHW Next Follow-up: message is route to [facility] Lead Care Coordinator requesting chart review regarding to patient agreed moving toward [facility] graduation.     Patient enrollment status: ccc maintenance.    Outreach frequency: 2 months.     CHW Plan: Route message to [facility] SW requesting chart reviewed for further advised. No CHW outreach plan at this time.

## 2021-06-15 NOTE — TELEPHONE ENCOUNTER
This medication comes from his neurologist.  We do not prescribe it so any confirmation on this needs to come from the neurologist.

## 2021-06-15 NOTE — TELEPHONE ENCOUNTER
Reason for Call:  Home Health Care    kb with [facility] called regarding (reason for call): pt   Orders are needed for this patient. pt    PT: 1 x week x 3 weeks  OT: no    Skilled Nursing: no    Pt Provider: den srivastava md    Phone Number Homecare Nurse can be reached at: [phone]    Can we leave a detailed message on this number? Yes     Phone number patient can be reached at: Other phone number:  [phone]    Best Time: anytime  Call taken on 3/1/2021 at 8:23 AM by Janki Ivey

## 2021-06-15 NOTE — TELEPHONE ENCOUNTER
"Reason for Call: Request for an order or referral:    Order or referral being requested: [facility] [employer] \"half leather sole\" order to be faxed [phone]    Date needed: as soon as possible    Has the patient been seen by the PCP for this problem? YES    Additional comments: right foot is catching as he walks is the reason its needed    Phone number Patient can be reached at: [phone] Other phone number:      Best Time:  As soon as possible    Can we leave a detailed message on this number?  Yes    Call taken on 2/24/2021 at 11:23 AM by Janki Ivey  "

## 2021-06-15 NOTE — TELEPHONE ENCOUNTER
Reason for Call:  Medication or medication refill:    Do you use a [facility]?  Name of the pharmacy and phone number for the current request: [facility]    Name of the medication requested: ambien    Other request: according to gopal at [facility] pt is completely out and alejandrina said they have contacted us twice for a refill. Can we do the refill asa please?    Can we leave a detailed message on this number? Yes    Phone number patient can be reached at: Other phone number:  Gopal at [phone]*    Best Time: asap     Call taken on 3/15/2021 at 10:00 AM by Rosa Torres

## 2021-06-15 NOTE — TELEPHONE ENCOUNTER
Reason for Call: Request for an order or referral:    Order or referral being requested: Verbal Orders    Date needed: as soon as possible    Has the patient been seen by the PCP for this problem? YES    Additional comments: Mary from [facility] called requesting for verbal orders.   Verbal orders needed:  Skilled Nursing 1 x/ week for 1 week, 2 x/ week for 5 weeks, and 3 visits as needed for med set up and assessment.     Phone number Patient can be reached at:  Other phone number:  [phone]    Best Time:  any    Can we leave a detailed message on this number?  Yes    Call taken on 2/22/2021 at 12:54 PM by Yue Truong

## 2021-06-15 NOTE — TELEPHONE ENCOUNTER
"I think this can/should wait for Dr. Valdez.    His current prescription appears to be Methylphenidate 10 mg twice a day.  So, I'm not sure why it's an issue that his med box can \"only\" do a three times a day schedule (obviously not all his meds are three times a day), AND, it appears he's not looking for 3 times a day, but is looking to increase his morning dose.      An increase in dose, especially on this medication, should be handled by someone who knows the patient.    "

## 2021-06-15 NOTE — TELEPHONE ENCOUNTER
Reason for Call:  Home Health Care    kb with [facility] called regarding (reason for call):orders    Orders are needed for this patient. Pt needed ,   are there any precautions needed for left hand?    PT: once a week for 1 week, twice a week for 2 weeks     OT: no    Skilled Nursing: no    Pt Provider: Dr Marlo Valdez    Phone Number Homecare Nurse can be reached at:[phone]    Can we leave a detailed message on this number? Yes     Phone number patient can be reached at: Other phone number:  [phone]    Best Time: as soon as possible    Call taken on 2/9/2021 at 8:35 AM by Janki Ivey

## 2021-06-15 NOTE — TELEPHONE ENCOUNTER
Reason for Call:  Other call back      Detailed comments: needs verbal ok regarding his methylphenidate his machine only allows  3 times aday,he is currently taking 2 in the morning and 1 at noon please confirm if this dosage is ok pt. Doesn't want to take evening one that's why he is taking 2 in the morning     Phone Number Patient can be reached at: Other phone number:  [phone]    Best Time: as soon as possible       Can we leave a detailed message on this number?: Yes    Call taken on 3/5/2021 at 10:06 AM by Janki Ivey

## 2021-06-15 NOTE — TELEPHONE ENCOUNTER
Reason for Call:  Other call back      Detailed comments: pt med machine only fits  3 times aday schedule   He would like to change his methylphenidate 10mg to 2 in the morning and 1 at noon     javier Number Patient can be reached at: Other phone number:  [phone]    Best Time: as soon as possible   Can we leave a detailed message on this number?: Yes    Call taken on 2/26/2021 at 10:06 AM by Janki Ivey

## 2021-06-15 NOTE — TELEPHONE ENCOUNTER
DOD: Nurse Eli from [facility] called for verbal order for:    Additional HomeCare OT for 2 times a day for 2 weeks for meds management and safety recommendations.    Eli's call back # [phone]. Ok to leave detailed messge.    Thank you.

## 2021-06-15 NOTE — TELEPHONE ENCOUNTER
Jimy from [facility] [phone] needing new orders for Speech 2 x week for 1 week ,external memory strategies,organization strategies,word finding

## 2021-06-15 NOTE — TELEPHONE ENCOUNTER
The methylphenidate is prescribed by his neurologist.    If there is a change in this medicine and needs to come from his neurologist

## 2021-06-16 PROBLEM — M19.032 ARTHRITIS OF LEFT WRIST: Status: ACTIVE | Noted: 2020-11-01

## 2021-06-16 PROBLEM — S27.321A RIGHT PULMONARY CONTUSION: Status: ACTIVE | Noted: 2019-10-29

## 2021-06-16 PROBLEM — J93.9 PNEUMOTHORAX ON RIGHT: Status: ACTIVE | Noted: 2019-10-27

## 2021-06-16 PROBLEM — S22.49XA MULTIPLE RIB FRACTURES: Status: ACTIVE | Noted: 2019-10-27
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
## 2021-06-16 PROBLEM — J94.2 HEMOTHORAX, RIGHT: Status: ACTIVE | Noted: 2019-10-27

## 2021-06-16 NOTE — TELEPHONE ENCOUNTER
Called and spoke to Wendy. I was going to relay Dr. Acuna's message but per her, pt went to [facility] and then after that pt had change his agency so they will no longer go to the pt's home.

## 2021-06-16 NOTE — TELEPHONE ENCOUNTER
It sounds like there is home care involved and vulnerable adult reporting already.  If he is unsafe, he'll need to be brought to ER where placement could happen.

## 2021-06-16 NOTE — TELEPHONE ENCOUNTER
I would advise he go to the ER and explain that he is not safe at home, and then they would admit him or get him where he needs to go.

## 2021-06-16 NOTE — TELEPHONE ENCOUNTER
Reason for Call: Request for an order or referral:    Order or referral being requested: referral for in stay physical therapy some type of TCU needed  Date needed: as soon as possible ex-wife is very concerned pt needs rehab  Has the patient been seen by the PCP for this problem? YES    Additional comments: was in [facility] for Oct November after his surgery pt is losing physical strength ,he has had 6 falls in the last week, was in [facility] for 2 days last week   Phone number Patient can be reached at:  Other phone number:  [phone]    Best Time:  As soon as possible   Can we leave a detailed message on this number?  Yes    Call taken on 3/30/2021 at 10:45 AM by Janki Ivey

## 2021-06-16 NOTE — TELEPHONE ENCOUNTER
Telephone Encounter by Arnulfo Chavez CMA at 8/2/2019  9:34 AM     Author: Arnulfo Chavez CMA Service: -- Author Type: Medical Assistant    Filed: 8/2/2019  9:37 AM Encounter Date: 8/2/2019 Status: Signed    : Arnulfo Chavez CMA (Medical Assistant)       Marlo Valdez MD P Olson, Peter Care Team Pool             Please contact this patient, let him know that the Depakote level was therapeutic.  Stay on the same dose.     I should be seeing him back for recheck in 4 months

## 2021-06-16 NOTE — TELEPHONE ENCOUNTER
ANDRES: Called and spoke to Wendy at [facility]. I was going to relay Dr. Acuna's message but per her, pt went to [facility] for his fall and then after that pt had change his agency so they will no longer go to the pt's home.

## 2021-06-16 NOTE — TELEPHONE ENCOUNTER
RN cannot approve Refill Request    RN can NOT refill this medication PCP messaged that patient is overdue for Labs. Last office visit: 7/31/2019 Marlo Valdez MD Last Physical: 11/18/2019 Last MTM visit: Visit date not found Last visit same specialty: 7/31/2019 Marlo Valdez MD.  Next visit within 3 mo: Visit date not found  Next physical within 3 mo: Visit date not found      Charo EMY Cuevas, [facility] Triage/Med Refill 4/6/2021    Requested Prescriptions   Pending Prescriptions Disp Refills     venlafaxine (EFFEXOR-XR) 75 MG 24 hr capsule [Pharmacy Med Name: Venlafaxine HCl ER 75MG CP24] 90 capsule 0     Sig: TAKE 3 CAPSULES BY MOUTH DAYS       Venlafaxine/Desvenlafaxine Refill Protocol Failed - 4/5/2021  2:37 PM        Failed - Fasting lipid cascade in last year     Cholesterol   Date Value Ref Range Status   11/18/2019 125 <=199 mg/dL Final     Triglycerides   Date Value Ref Range Status   11/18/2019 163 (H) <=149 mg/dL Final     HDL Cholesterol   Date Value Ref Range Status   11/18/2019 34 (L) >=40 mg/dL Final     LDL Calculated   Date Value Ref Range Status   11/18/2019 58 <=129 mg/dL Final     Patient Fasting > 8hrs?   Date Value Ref Range Status   11/18/2019 Yes  Final             Passed - LFT or AST or ALT in last year     Albumin   Date Value Ref Range Status   10/22/2020 3.7 3.5 - 5.0 g/dL Final     Bilirubin, Total   Date Value Ref Range Status   10/22/2020 0.3 0.0 - 1.0 mg/dL Final     Bilirubin, Direct   Date Value Ref Range Status   01/30/2017 0.1 <=0.5 mg/dL Final     Alkaline Phosphatase   Date Value Ref Range Status   10/22/2020 76 45 - 120 U/L Final     AST   Date Value Ref Range Status   10/22/2020 15 0 - 40 U/L Final     ALT   Date Value Ref Range Status   10/22/2020 10 0 - 45 U/L Final     Protein, Total   Date Value Ref Range Status   10/22/2020 7.0 6.0 - 8.0 g/dL Final                Passed - PCP or prescribing provider visit in last year     Last office visit with  prescriber/PCP: 7/31/2019 Marlo Valdez MD OR same dept: Visit date not found OR same specialty: 7/31/2019 Marlo Valdez MD  Last physical: 11/18/2019 Last MTM visit: Visit date not found   Next visit within 3 mo: Visit date not found  Next physical within 3 mo: Visit date not found  Prescriber OR PCP: Marlo Valdez MD  Last diagnosis associated with med order: 1. Major depressive disorder, single episode  - venlafaxine (EFFEXOR-XR) 75 MG 24 hr capsule [Pharmacy Med Name: Venlafaxine HCl ER 75MG CP24]; TAKE 3 CAPSULES BY MOUTH DAYS  Dispense: 90 capsule; Refill: 0    If protocol passes may refill for 12 months if within 3 months of last provider visit (or a total of 15 months).             Passed - Blood Pressure in last year     BP Readings from Last 1 Encounters:   12/28/20 119/75

## 2021-06-16 NOTE — TELEPHONE ENCOUNTER
Reason for Call: Request for an order or referral:    Order or referral being requested: Verbal Order      Date needed: as soon as possible    Has the patient been seen by the PCP for this problem? YES and Not Applicable    Additional comments: Wendy from [facility] called requesting verbal order for the following: Home care PT 1 x for next week.  She also wanted to let provider know that patient had a fall on Monday and is pretty sore from that fall. He is unable to walk as usual and he did not get his orthotics adjusted due to this incident. He is home alone, and his home care team is concerned about this. They have a family meeting next week to come up with a plan.    Phone number Patient can be reached at:  Other phone number:  [phone]    Best Time:  any    Can we leave a detailed message on this number?  Yes    Call taken on 3/25/2021 at 8:45 AM by Yue Truong

## 2021-06-16 NOTE — PROGRESS NOTES
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  Years ago.    He continues to see neurology, Dr. Edgar Butts over [place].  He does take Ritalin    I did refill his Depakote recheck labs today including Depakote lipid and CMP.    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  I filled out the form for him there he has no communicable diseases.    If he needs a mental he will need to come back during the early part of the week so he can read it in a couple days.    He needed a shower chair with removable handles for safety I fax this to his medical care coordinator, Young CAMARILLO  At fax number [phone]    Patient's feels like he has done about the same he does have some ongoing pain through the wrist please see previous evaluations from Dr. Brandt from [place] [facility].  Patient does take occasional oxycodone and I did refill that.    Patient continues on his venlafaxine and Wellbutrin for his depression that seems to be stable.  Takes zolpidem at night.    We will follow-up with neurology for the Ritalin I did refill the Depakote.  Her graph he takes naproxen also for the wrist    He has had Botox in the past I think he may benefit again from this.    Tobacco status: He  reports that he has quit smoking. His smoking use included Cigarettes. He has never used smokeless tobacco.    Patient Active Problem List    Diagnosis Date Noted     Inguinal Hernia      Aneurysm Of The Basilar Tip      Major Depression, Single Episode      Chronic Cutaneous Ulcer Venous Stasis      Contusion Of The Right Chest Wall With Intact Skin Surface      Hyperlipidemia      Hemiparesis      Nicotine Dependence        Current Outpatient Prescriptions   Medication Sig Dispense Refill     buPROPion (WELLBUTRIN XL) 150 MG 24 hr tablet TAKE 3 TABLETS (450 MG TOTAL) BY MOUTH EVERY  "MORNING. 270 tablet 3     divalproex (DEPAKOTE) 500 MG EC tablet Take 2 tablets (1,000 mg total) by mouth 2 (two) times a day. 180 tablet 0     methylphenidate (RITALIN) 10 MG tablet Take 5-10 mg by mouth 2 (two) times a day.        naproxen (NAPROSYN) 500 MG tablet TAKE 1 TABLET BY MOUTH TWICE A  tablet 0     oxyCODONE-acetaminophen (PERCOCET) 5-325 mg per tablet Take 1 tablet by mouth every 6 (six) hours as needed for pain. Uses for wrist pain as needed 40 tablet 0     tadalafil (CIALIS) 5 MG tablet TAKE 1 TABLET BY MOUTH ONCE DAILY 30 tablet 1     tamsulosin (FLOMAX) 0.4 mg Cp24 TAKE 1 CAPSULE (0.4 MG TOTAL) BY MOUTH DAILY. 30 capsule 6     venlafaxine (EFFEXOR-XR) 75 MG 24 hr capsule TAKE 3 CAPSULES (225 MG TOTAL) BY MOUTH DAILY. 270 capsule 1     zolpidem (AMBIEN) 5 MG tablet Take 1 tablet (5 mg total) by mouth at bedtime as needed for sleep. 30 tablet 2     No current facility-administered medications for this visit.        ROS:   10 point review of systems positive as outlined above    Objective:    /80  Pulse (!) 120  Temp 98.6  F (37  C) (Oral)   Ht 6' 0.5\" (1.842 m)  Wt 203 lb (92.1 kg)  SpO2 93%  BMI 27.15 kg/m2  Body mass index is 27.15 kg/(m^2).      General appearance no acute distress at rest    Vital signs are stable weight is stable    Right hemiparesis as before    Lungs are clear no rales or rhonchi heart regular S1-S2    Patient has the ongoing pain in through the left wrist no significant swelling    HEENT oropharynx clear pupils react normally extraocular movements full no scleral icterus    Skin was normal no rashes    He has trace peripheral edema he does use support hose I did give him a prescription for some compression stockings.    Lab work pending includes Depakote level, CMP, lipid.        Assessment:  1. Hemiparesis  divalproex (DEPAKOTE) 500 MG EC tablet    Valproic Acid (Depakene )    Comprehensive Metabolic Panel    Lipid Cascade   2. Wrist pain, chronic, left  " naproxen (NAPROSYN) 500 MG tablet    oxyCODONE-acetaminophen (PERCOCET) 5-325 mg per tablet    Ambulatory referral to Orthopedic Surgery   3. Major depressive disorder, single episode  zolpidem (AMBIEN) 5 MG tablet    venlafaxine (EFFEXOR-XR) 75 MG 24 hr capsule   4. Aneurysm Of The Basilar Tip  divalproex (DEPAKOTE) 500 MG EC tablet     5.  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 Butts from neurology    Referral to Dr. Brandt regarding the left wrist.    Refill on the Percocet    Plan: As outlined above    This transcription uses voice recognition software, which may contain typographical errors.

## 2021-06-16 NOTE — TELEPHONE ENCOUNTER
Reason for Call:  Other reporting a fall     Detailed comments: pt was found yesterday laying on the floor with no clothes on this also occurred Monday morning he was found by  ILS worker laying on the bathroom floor with no clothes on  Pt. Stated he slipped off his bed /2nd fall in 3 days/ a vulnerable adult report was submitted last night  A care conference will be set up 03/30@330 with his ex-wife ,in the past accent  [facility]  has requested more services which pt has refused   one Number Patient can be reached at: Other phone number:  [phone]    Best Time: anytime mon-thur 8-5    Can we leave a detailed message on this number?: Yes    Call taken on 3/25/2021 at 10:22 AM by Janki Ivey

## 2021-06-16 NOTE — TELEPHONE ENCOUNTER
Reason for Call:  Home Health Care    kb with [facility] called regarding (reason for call):     Orders are needed for this patient.     PT:     OT: 1 time next week to help hi adjust to orthopedics    Skilled Nursing: n/a    Pt Provider: dr srivastava    Phone Number Homecare Nurse can be reached at: [phone]    Can we leave a detailed message on this number? Yes     Phone number patient can be reached at: Other phone number:  [phone]    Best Time: any  verbasl only    Call taken on 3/16/2021 at 11:23 AM by Rosa Torres

## 2021-06-17 NOTE — TELEPHONE ENCOUNTER
divalproex (DEPAKOTE DR) 500 MG 12 hour tablet [[id number]]    Electronically signed by: Arlene Casas RN on 03/12/20 2245 Status: Discontinued   Ordering user: Arlene Casas RN 03/12/20 2245 Ordering provider: Marlo Valdez MD   Authorized by: Marlo Valdez MD   Frequency:  03/12/20 - 01/13/21  Released by: Arlene Casas RN 03/12/20 2245   Discontinued by: Marlo Valdez MD 01/15/21 1055 [Reorder]   Diagnoses   Hemiplegia (H) [G81.90]   Cerebral aneurysm, nonruptured [I67.1]

## 2021-06-17 NOTE — TELEPHONE ENCOUNTER
RN Triage:    Caller is KAMRAN Westfall from [facility].  Malou is setting up Nino's medications at home and is calling for clarification on his current Depakote dose.  Depakote dosing was shared with Malou per current med list on EHR.    Linsey Boucher RN  [facility] Nurse Advisor

## 2021-06-17 NOTE — TELEPHONE ENCOUNTER
Reason for Call:  Home Health Care    gillian with [facility] called regarding (reason for call): verbal orders    Orders are needed for this patient.     PT: no    OT: 2 times a week x 2  1 time a week x 2 4 adl's        Skilled Nursing:no    Pt Provider: dr srivastava    Phone Number Homecare Nurse can be reached at: [phone] verbal orders    Can we leave a detailed message on this number? Yes     Phone number patient can be reached at: Other phone number:  [phone]    Best Time: any    Call taken on 5/4/2021 at 2:45 PM by Rosa Torres

## 2021-06-17 NOTE — TELEPHONE ENCOUNTER
Patient called back, message was relayed. He expressed that he is doing much better and stronger. He feels that the Ambien really helps, as he wakes up feeling refreshed. Patient stated that the melatonin is not working for him at all. He would like to try the hydroxyzine. Please send RX for Hydroxyzine to pharmacy.

## 2021-06-17 NOTE — TELEPHONE ENCOUNTER
Reason for Call:  Medication or medication refill:    Do you use a [facility]?  Name of the pharmacy and phone number for the current request: [facility], [facility] [place]    Name of the medication requested: zolpidem (AMBIEN) 5 MG tablet    Other request: Patient called to request a refill on this medication. He was prescribed melatonin and it does work for him. He would like a refill on the Ambien to be sent to his pharmacy on file.    Can we leave a detailed message on this number? Yes    Phone number patient can be reached at: Home number on file [phone] (home)    Best Time: any    Call taken on 5/18/2021 at 1:26 PM by Yue Truong

## 2021-06-17 NOTE — PROGRESS NOTES
Assessment & Plan     Nino was seen today for inf follow up.    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    Aneurysm Of The Basilar Tip  -     Valproic Acid (Depakene )  -     divalproex (DEPAKOTE) 250 MG 12 hour tablet; 3 po two times a day    Major depressive disorder with single episode, remission status unspecified    Benign prostatic hyperplasia with lower urinary tract symptoms, symptom details unspecified  -     PSA (Prostatic-Specific Antigen), Diagnostic  -     tamsulosin (FLOMAX) 0.4 mg cap; 1 po at bedtime    Compression fracture of L1 vertebra, sequela          Hospital and TCU discharge follow-up regarding patient's closed rib fractures on the right as well as compression fracture of L1. Healing.    I am going right hemiparesis secondary to his previous aneurysm bleed years ago. He does have some mild aphasia with word finding. Cognitive deficits secondary to this as well.    At risk for ongoing falls and his present living situation.    I do feel that he needs to be in a more assisted living facility and he is presently working to get help with ADLs including meds set up cooking cleaning.    This was discussed with the patient and his ILS worker, Liset. Patient was in agreement. They will pursue this, there are some places with  Has shown interest in visiting.    Continue PT OT, consider scooter although at this time would like to see how he does progressing with PT and OT and living situation change. I do want him to continue to be active but in a safe situation.    We will check Depakote level and PSA level. Stay on the same present dose of Depakote as discussed below and continue tamsulosin at bedtime.    Follow-up in 4 weeks with me for recheck.        I did review his images regarding his rib fractures and thoracic  and lumbar fractures as well as his lab results while in the hospital at [facility] from April 2 through April 5. Also reviewed admission history and physical and discharge summaries from the physicians.    Also reviewed notes from the skilled nursing regarding questions surrounding his tamsulosin from the end of April. He was in the TCU from April 5 to approximately April 29.  .       Return in about 1 month (around 6/10/2021) for Recheck.    Marlo Valdez MD  [facility]    Subjective   Nino Stephen is 60 y.o. and presents today for the following health issues   HPI   This patient has an office visit follow-up. He was hospitalized at [facility] from April 2 through April 5 following a fall at his home he lives in an apartment independently. He had right lateral and posterior rib fractures (seven through 10 rib fracture posteriorly, and seven through nine lateral rib fracture).    Patient had a chronic appearing T12 compression fracture in more recent L1 compression noted on imaging as well    The humerus was normal    His head scan was unchanged.    He has the previous aneurysm rupture with resultant aphasia and right hemiparesis. That seems to be unchanged    It is unclear exactly why he has fallen he has had some more recent falls this past winter.    I met today with the patient along with Liset, from [facility] (independent living services). She is an advocate for the patient works about 5 hours/week with him.    The patient is presently on skilled home nursing for med management also PT and OT.    He is living independently, we had a long discussion with the patient along with Liset regarding moving to a more assisted living situation where he has increased support with help with meals cooking cleaning this was if he needs that. While allowing him to still be fairly independent.    Presently his home is quite cluttered and he is at risk for falling again.    I reviewed  the discharge summary I did not have the TCU documentation.    At discharge from the hospital he continued on the venlafaxine, bupropion, baclofen, Depakote (at a lower dose now 250 mg tablet taking three twice daily), down from 500 mg 2 tablets twice a day because the Depakote level was elevated at hospital. He also continues on his methylphenidate and tamsulosin at bedtime. He does feel that that helps with his urination to some degree    He is no longer on zolpidem no longer on oxycodone.    His rib symptoms seem to be improving he is breathing easier but still has some discomfort when he coughs or takes a deep breath      Review of Systems  10 point review of systems positive as outlined above otherwise negative      Objective    /81 (Patient Site: Left Arm, Patient Position: Sitting, Cuff Size: Adult Regular)   Pulse 90   Temp 98.1  F (36.7  C) (Oral)   Wt 183 lb 4 oz (83.1 kg)   SpO2 99%   BMI 24.85 kg/m    Body mass index is 24.85 kg/m .  Physical Exam  General appearance no acute distress    HEENT no head trauma.    Pupils react normally extract movements full    Lungs: Nonlabored breathing no wheezing or rales good air exchange    Heart: Heart regular, S1-S2    Abdomen soft nontender.    He does have some slight chest wall tenderness on the right    Extremities without edema    Right-sided hemiparesis. As before.    Left wrist status post surgery has minimal pain there now better strength.    Labs today will include Depakote level and PSA

## 2021-06-17 NOTE — TELEPHONE ENCOUNTER
Called and left message#1 for patient to return call to clinic for message. Okay to relay message.

## 2021-06-17 NOTE — TELEPHONE ENCOUNTER
Reason for Call: Request for an order or referral:    Order or referral being requested: Verbal Orders    Date needed: as soon as possible    Has the patient been seen by the PCP for this problem? Not Applicable    Additional comments: Yahaira from [facility] called to request verbal orders for the following:  Skilled Nursing- 1 x /week for 5 weeks for med / pain management  SW- Re-Eval for long term plan    Phone number Patient can be reached at:  Other phone number:  [phone]    Best Time:  any    Can we leave a detailed message on this number?  Yes    Call taken on 5/3/2021 at 9:03 AM by Yue Truong

## 2021-06-17 NOTE — TELEPHONE ENCOUNTER
Reason for Call:  Other      Detailed comments: mitra from [facility] called. Pt was just transferred over. The hospital put him on flomax and he wants to know if dr srivastava wants him to remain on that rx. Also does dr srivastava need him to see urologist? Pt hs an appt 5/10 and hes hoping dr srivastava can discuss these issues at that time.    Phone Number Patient can be reached at: Other phone number:  [phone]*    Best Time: any    Can we leave a detailed message on this number?: Yes    Call taken on 4/29/2021 at 1:14 PM by Rosa Torres

## 2021-06-17 NOTE — TELEPHONE ENCOUNTER
Reason for Call:  Other verbal ordersd     Detailed comments: [facility] needs verbal orders for pt 1 time a week x 1 and 2 times a week x 4    Phone Number Patient can be reached at: Other phone number:  [phone]*Best Time: any    Can we leave a detailed message on this number?: Yes    Call taken on 5/7/2021 at 2:33 PM by Rosa Torres

## 2021-06-17 NOTE — TELEPHONE ENCOUNTER
I think he needs to stay on the medication until he follows with Dr. Valdez unless is experiencing side effect, he will probably going need to follow with a urologist also.

## 2021-06-17 NOTE — TELEPHONE ENCOUNTER
RN cannot approve Refill Request    RN can NOT refill this medication Refill Rx dose different than dose on current med list. Last office visit: 5/10/2021 Marlo Valdez MD Last Physical: 11/18/2019 Last MTM visit: Visit date not found Last visit same specialty: 5/10/2021 Marlo Valdez MD.  Next visit within 3 mo: Visit date not found  Next physical within 3 mo: Visit date not found      Laisha Cosby, [facility] Triage/Med Refill 5/19/2021    Requested Prescriptions   Pending Prescriptions Disp Refills     divalproex (DEPAKOTE DR) 500 MG 12 hour tablet [Pharmacy Med Name: DIVALPROEX SOD  MG TAB] 360 tablet 0     Sig: TAKE 2 TABLETS BY MOUTH TWICE A DAY       Divalproex/Valproic Acid Refill Protocol Passed - 5/18/2021  3:16 PM        Passed - LFT or AST or ALT in last 12 months     Albumin   Date Value Ref Range Status   10/22/2020 3.7 3.5 - 5.0 g/dL Final     Bilirubin, Total   Date Value Ref Range Status   10/22/2020 0.3 0.0 - 1.0 mg/dL Final     Bilirubin, Direct   Date Value Ref Range Status   01/30/2017 0.1 <=0.5 mg/dL Final     Alkaline Phosphatase   Date Value Ref Range Status   10/22/2020 76 45 - 120 U/L Final     AST   Date Value Ref Range Status   10/22/2020 15 0 - 40 U/L Final     ALT   Date Value Ref Range Status   10/22/2020 10 0 - 45 U/L Final     Protein, Total   Date Value Ref Range Status   10/22/2020 7.0 6.0 - 8.0 g/dL Final                Passed - CBC w/ plts (Hm2) in last 12 months      WBC   Date Value Ref Range Status   11/02/2020 6.6 4.0 - 11.0 thou/uL Final     Hemoglobin   Date Value Ref Range Status   11/02/2020 12.8 (L) 14.0 - 18.0 g/dL Final     Hematocrit   Date Value Ref Range Status   11/02/2020 39.3 (L) 40.0 - 54.0 % Final     Platelets   Date Value Ref Range Status   11/02/2020 263 140 - 440 thou/uL Final             Passed - PCP or prescribing provider visit in last 12 months       Last office visit with prescriber/PCP: 5/10/2021 Marlo Valdez MD OR same  dept: 5/10/2021 Marlo Valdez MD OR same specialty: 5/10/2021 Marlo Valdez MD  Last physical: 11/18/2019 Last MTM visit: Visit date not found   Next visit within 3 mo: Visit date not found  Next physical within 3 mo: Visit date not found  Prescriber OR PCP: Marlo Valdez MD  Last diagnosis associated with med order: 1. Hemiparesis  - divalproex (DEPAKOTE DR) 500 MG 12 hour tablet [Pharmacy Med Name: DIVALPROEX SOD  MG TAB]; TAKE 2 TABLETS BY MOUTH TWICE A DAY  Dispense: 360 tablet; Refill: 0    2. Aneurysm Of The Basilar Tip  - divalproex (DEPAKOTE DR) 500 MG 12 hour tablet [Pharmacy Med Name: DIVALPROEX SOD  MG TAB]; TAKE 2 TABLETS BY MOUTH TWICE A DAY  Dispense: 360 tablet; Refill: 0    If protocol passes may refill for 12 months if within 3 months of last provider visit (or a total of 15 months).

## 2021-06-17 NOTE — TELEPHONE ENCOUNTER
rAeli from [facility] called back to check on the status of these verbal orders. Please call Areli with verbal orders.

## 2021-06-17 NOTE — TELEPHONE ENCOUNTER
Reason for Call:  Other call back      Detailed comments: jennifer from [facility] calling to ask for sone clarification on the patients depako rx    Phone Number Patient can be reached at: Other phone number:  [phone]    Best Time: any    Can we leave a detailed message on this number?: Yes    Call taken on 5/12/2021 at 1:40 PM by Rosa Torres

## 2021-06-17 NOTE — TELEPHONE ENCOUNTER
As we talked at the last visit, Nino has been having increased falls.    It is not advisable to go back on the Ambien.  He needs to stick with the melatonin, or we could try something like hydroxyzine.  But not the Ambien

## 2021-06-17 NOTE — TELEPHONE ENCOUNTER
The Depakote level looked good, so he should stay on the same dose of Depakote that he has been taking.    I believe it is Depakote 250 mg, 3 tablets twice a day.  If that is not the dose that he has been taking then I need to know.    Also please contact the patient and let him know that the PSA level was elevated at 4.8.  This is above the normal range and brings up the possibility of prostate cancer.    I would like him to see a urologist regarding this and I put in a referral to see the urologist.    I would like to see the patient back for recheck with me in a couple months

## 2021-06-19 NOTE — PROGRESS NOTES
"HPI - 56 yo male with h/o stroke/hemiparesis here with abdo pain.     Pain in mid upper left abdomen started last week and was associated with  with \"head spinning\" and off balance.   The \"head spinning\"  then resolved 2-3 minutes.   The abdo pain has been dull and constant for the week.   Never had this kind of pain before  No constipation - BM daily and regular  No fever  No sick contacts  No new foods, no travel or tick exposure    REVIEW OF SYSTEMS  General: no weight changes, fatigue  HEENT:  no HA,  no vision changes, URI sx  Respiratory:  no cough, dyspnea  Cardiovascular: no chest pain, palpitations  Gastrointestinal: no nausea/vomiting, diarrhea/constipation  : no dysuria  Neurologic: no seizures, focal weakness, tremors  Skin: no rashes    Reviewed meds/PMH      Current Outpatient Prescriptions   Medication Sig Note     buPROPion (WELLBUTRIN XL) 150 MG 24 hr tablet Take 1 tablet (150 mg total) by mouth every morning.      divalproex (DEPAKOTE) 500 MG EC tablet TAKE 2 TABLETS BY MOUTH TWICE A DAY 8/9/2018: Need refills     methylphenidate (RITALIN) 10 MG tablet Take 5-10 mg by mouth 2 (two) times a day.  6/12/2014: Original Rx was for 10 mg tid - pt states he now takes 1/2-1 tab up to twice a day.     naproxen (NAPROSYN) 500 MG tablet TAKE 1 TABLET BY MOUTH TWICE A DAY      oxyCODONE-acetaminophen (PERCOCET) 5-325 mg per tablet Take 1 tablet by mouth every 6 (six) hours as needed for pain. Uses for wrist pain as needed      sildenafil, antihypertensive, (REVATIO) 20 mg tablet 2-3 po 1 hour prior to sex as needed      tadalafil (CIALIS) 5 MG tablet TAKE 1 TABLET BY MOUTH ONCE DAILY      tamsulosin (FLOMAX) 0.4 mg Cp24 TAKE 1 CAPSULE (0.4 MG TOTAL) BY MOUTH DAILY.      venlafaxine (EFFEXOR-XR) 75 MG 24 hr capsule TAKE 3 CAPSULES (225 MG TOTAL) BY MOUTH DAILY.      zolpidem (AMBIEN) 5 MG tablet Take 1 tablet (5 mg total) by mouth at bedtime as needed for sleep.      Vitals:    08/09/18 1613   BP: 108/78 " "  Patient Site: Left Arm   Patient Position: Sitting   Cuff Size: Adult Regular   Pulse: 69   Resp: 16   Temp: 98  F (36.7  C)   TempSrc: Oral   SpO2: 95%   Weight: 186 lb 11.2 oz (84.7 kg)   Height: 6' 0.5\" (1.842 m)     OBJECTIVE:  Vitals listed above within normal limits  General appearance: well groomed, pleasant, well hydrated, nontoxic appearing  ENT: PERRL, throat clear  Neck: neck supple, no lymphadenopathy, no thyromegaly  Lungs: lungs clear to auscultation bilaterally, no wheezes or rhonchi  Heart: regular rate and rhythm, no murmurs, rubs or gallops  Abdomen: soft, tender in LUQ   Neuro: no focal deficits, CN II-XII grossly intact, alert and oriented  Psych:  mood stable, appears to have good insight and judgment        A/P  LUQ pain - unclear etiology  Check labs: CBC, CMP, lipase -pending    AXR= I personally called the radiologist to discuss the xray.   Not obstructed, unable to completely determine that there is no free air and recommended an abdo CT    abdo CT ordered --pt will schedule and set up with [facility]    Spent 25 min face to face with patient with more the 50% spent in counseling, reviewing chart, and coordination of care and discussing problems listed above.     "

## 2021-06-20 NOTE — LETTER
Letter by Hardik Reno LGSW at      Author: Hardik Reno LGSW Service: -- Author Type: --    Filed:  Encounter Date: 10/1/2020 Status: (Other)       CARE COORDINATION    October 2, 2020    Nino Stephen  [address]  [place] [address]      Dear Nino,    I am a clinic care coordinator who works with Marlo Valdez MD at [facility]. I wanted to thank you for spending the time to talk with me.  Below is a description of clinic care coordination and how I can further assist you.      The clinic care coordination team is made up of a registered nurse,  and community health worker who understand the health care system. The goal of clinic care coordination is to help you manage your health and improve access to the health care system in the most efficient manner. The team can assist you in meeting your health care goals by providing education, coordinating services, strengthening the communication among your providers and supporting you with any resource needs.    Please feel free to contact the Community Health Worker at [phone] with any questions or concerns. We are focused on providing you with the highest-quality healthcare experience possible and that all starts with you.     Sincerely,     Hardik Reno    Enclosed: I have enclosed a copy of the Care Plan. This has helpful information and goals that we have talked about. Please keep this in an easy to access place to use as needed.

## 2021-06-20 NOTE — LETTER
Letter by Hardik Reno LGSW at      Author: Hardik Reno LGSW Service: -- Author Type: --    Filed:  Encounter Date: 10/1/2020 Status: (Other)       Care Plan  About Me:    Patient Name:  Nino Stephen    YOB: 1960  Age:         60 y.o.   [facility] MRN:    [id number] Telephone Information:  Home Phone [phone]   Mobile [phone]       Address:  [address] [place] [address] [address]  [facility] [address] Email address:  [email or web address]      Emergency Contact(s)  Extended Emergency Contact Information      Name: NONE,PER PT    Relation: Declined      Name: NONE,PER PT    Relation: Declined          Primary language:  English     needed? No   [employer]:  [phone] op. 1  Other communication barriers:    Preferred Method of Communication:     Current living arrangement: I live alone  Mobility Status/ Medical Equipment: Independent w/Device    Health Maintenance  Health Maintenance Reviewed: Not assessed    My Access Plan  Medical Emergency 911   Primary Clinic Line Marlo Valdez MD - [phone]   24 Hour Appointment Line [phone] or  [phone]) (toll-free)   24 Hour Nurse Line [phone] (toll-free)   Preferred Urgent Care [facility], [phone]   [facility] [place]  [phone]   Preferred Pharmacy [id number] [place] [facility] [address] [facility]     Behavioral Health Crisis Line The National Suicide Prevention Lifeline at 1-191.108.2627 or 911             My Care Team Members  Patient Care Team       Relationship Specialty Notifications Start End    Marlo Valdez MD PCP - General   3/22/07     Phone: [phone] Fax: [phone]         [address] [place] [address]    Marlo Valdez MD Assigned PCP   7/28/19     Phone: [phone] Fax: [phone]         [address] [place] [address]    Hardik Reno LGSW Lead Care  Coordinator Primary Care - CC Admissions 10/2/20     Fax: [phone]         Tabitha Jason [facility] Community Health Worker Primary Care - CC Admissions 10/2/20     Phone: [phone] Fax: [phone]        Sophia Noel, RN Clinic Care Coordinator Primary Care - [facility] Admissions 10/2/20     [place]    10/2/20     [facility] Care Coordinator    Phone: [phone]         Aida Oliveraon    10/2/20     [facility]     Phone: [phone]         [place]    10/2/20     Dr. Pate (Completing Hand Surgery) Care Coordinator    Phone: [phone]                 My Care Plans  Self Management and Treatment Plan  Goals and (Comments)  Goals        General    Functional (pt-stated)     Notes - Note created  10/2/2020  8:47 AM by Hardik Reno LGSW    Goal Statement: I want to be aware of resources available to me post op for home care and/or TCU as soon as possible  Date Goal set: 10/02/20  Barriers: Insurance Coverage, Medical concerns  Strengths: Suppport  Date to Achieve By: ASAP  Patient expressed understanding of goal: Yes  Action steps to achieve this goal:  1. CCC team will work to assist with the process of getting home care and/or TCU services post op on 10/28/2020          Medical (pt-stated)     Notes - Note created  10/2/2020  8:48 AM by Hardik Reno LGSW    Goal Statement: I want to pursue botox for my right hand prior to 10/28//2020  Date Goal set: 10/02/20  Barriers: Unsure of process  Strengths: Support  Date to Achieve By: Prior to 10/28/2020  Patient expressed understanding of goal: Yes  Action steps to achieve this goal:  1. I will work with my PCP team and specialist to get botox in my right hand  2. I will update CCC team                   Advance Care Plans/Directives Type:        My Medical and Care Information  Problem List   Patient Active Problem List   Diagnosis   ? Aneurysm Of The Basilar Tip   ? Major Depression, Single Episode   ? Chronic Cutaneous Ulcer Venous Stasis    ? Contusion Of The Right Chest Wall With Intact Skin Surface   ? Hyperlipidemia   ? Hemiparesis   ? Nicotine Dependence   ? Inguinal Hernia      Current Medications and Allergies:  See printed Medication Report.    Care Coordination Start Date: 10/1/2020   Frequency of Care Coordination:     Form Last Updated: 10/02/2020

## 2021-06-21 NOTE — LETTER
Letter by Shine Matt MD at      Author: Shine Matt MD Service: -- Author Type: --    Filed:  Encounter Date: 12/11/2020 Status: (Other)         Patient: Nino Stephen   MR Number: [id number]   YOB: 1960   Date of Visit: 12/11/2020      [facility]    Facility:  [facility] [[id number]]    Code Status:  FULL CODE    Chief Complaint   Patient presents with   ? Review Of Multiple Medical Conditions   :                    Patient Active Problem List   Diagnosis   ? Aneurysm Of The Basilar Tip   ? Major Depression, Single Episode   ? Chronic Cutaneous Ulcer Venous Stasis   ? Contusion Of The Right Chest Wall With Intact Skin Surface   ? Hyperlipidemia   ? Hemiparesis   ? Nicotine Dependence   ? Inguinal Hernia   ? Aphasia due to late effects of cerebrovascular disease   ? Cognitive deficits as late effect of cerebrovascular disease   ? Abnormality of gait   ? Fall   ? Hemiplegia of dominant side as late effect following cerebrovascular disease (H)   ? Hemothorax, right   ? Multiple rib fractures   ? Muscle spasm   ? Pneumothorax on right   ? Subarachnoid hemorrhage (H)   ? Right pulmonary contusion   ? Arthritis of left wrist       History:  Nino Stephen  is a 60 year old male with history of 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    Hospital Course: Patient was admitted to same-day surgery and discharged on the same day, October 28.  He has severe left wrist arthritis and underwent 4 corner fusion with scaphoidectomy with Dr. Pate.  He required planned TCU admission postoperatively due to his right hemiparesis, left wrist surgery.  He lives alone.  Postoperative pain managed by ibuprofen, Dilaudid, Tylenol, Vistaril    Subjective/ROS:     -augmented by discussion with facility staff involved in direct care    -Patient reports things are going relatively well.  He is working on the strength of his hand.  He had his follow-up with Dr. Pate and says that his pinch  was at 50 pounds with an ultimate goal of 100 pounds.  He is satisfied with his progress.  -Patient continues to be on oxycodone for pain control.  Ms. Hua has talked to him and that they are beginning to wean.  She had ibuprofen in hopes of completing that wean.  I support that effort, its been over a month since surgery now.  -Patient requests increase in activity.  The Covid quarantine is very difficult considering the duration has been here.  He has not been able to use the gym.  He is not getting formal physical therapy right now.  He is doing some of his own exercises in the room.  He does not have any Therabands or other equipment.  -Patient otherwise feels well no fever sweats or chills falls injuries.  No difficulty with bowel movements no bladder complaints.  He is sleeping adequately.  Remainder negative    Past Medical History:   Diagnosis Date   ? Depression    ? Hemiparesis as late effect of cerebral aneurysm (H)     right side   ? Hyperlipidemia    ? Inguinal hernia     bilateral   ? Seizures (H)    ? Stroke (H)     brain aneurysm     Past Surgical History:   Procedure Laterality Date   ? BRAIN SURGERY      ruptured aneurysm with coil          Family History   Problem Relation Age of Onset   ? No Medical Problems Mother    ? No Medical Problems Father    :   Family history: As opposed to the above there is a family history of leukemia and neurofibromatosis    Social History     Socioeconomic History   ? Marital status: Patient Declined     Spouse name: Not on file   ? Number of children: Not on file   ? Years of education: Not on file   ? Highest education level: Not on file   Occupational History   ? Not on file   Social Needs   ? Financial resource strain: Not on  file   ? Food insecurity     Worry: Not on file     Inability: Not on file   ? Transportation needs     Medical: Not on file     Non-medical: Not on file   Tobacco Use   ? Smoking status: Former Smoker     Types: Cigarettes   ? Smokeless tobacco: Never Used   Substance and Sexual Activity   ? Alcohol use: No   ? Drug use: No   ? Sexual activity: Not on file   Lifestyle   ? Physical activity     Days per week: Not on file     Minutes per session: Not on file   ? Stress: Not on file   Relationships   ? Social connections     Talks on phone: Not on file     Gets together: Not on file     Attends Caodaism service: Not on file     Active member of club or organization: Not on file     Attends meetings of clubs or organizations: Not on file     Relationship status: Not on file   ? Intimate partner violence     Fear of current or ex partner: Not on file     Emotionally abused: Not on file     Physically abused: Not on file     Forced sexual activity: Not on file   Other Topics Concern   ? Not on file   Social History Narrative   ? Not on file   :    Patient says he lives in [place] in a handicapped accessible apartment.  He lives alone.  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  More recently he has worked in [employer].  He says he has a wheel in his bathroom.    Current Outpatient Medications on File Prior to Visit   Medication Sig Dispense Refill   ? acetaminophen (TYLENOL) 500 MG tablet Take 1,000 mg by mouth 3 (three) times a day.      ? baclofen (LIORESAL) 10 MG tablet Take 10 mg by mouth 3 (three) times a day.     ? buPROPion (WELLBUTRIN XL) 150 MG 24 hr tablet Take 150 mg by mouth daily.     ? divalproex (DEPAKOTE DR) 500 MG 12 hour tablet TAKE 2 TABLETS BY MOUTH TWICE A  tablet 2   ? ibuprofen (ADVIL,MOTRIN) 800 MG tablet Take 800 mg by mouth daily.     ? methylphenidate HCl (RITALIN) 10 MG tablet Take 1 tablet (10 mg total) by  mouth 2 (two) times a day. 60 tablet 0   ? nicotine (NICODERM CQ) 14 mg/24 hr Place 1 patch on the skin daily.     ? oxyCODONE (ROXICODONE) 5 MG immediate release tablet Take 1-1.5 tablets (5-7.5 mg total) by mouth every 4 (four) hours as needed for pain. Take 5mg for pain 3-6/10 and 7.5mg for pain 7-10/10 45 tablet 0   ? senna-docusate (SENNOSIDES-DOCUSATE SODIUM) 8.6-50 mg tablet Take 1 tablet by mouth 2 (two) times a day.     ? venlafaxine (EFFEXOR-XR) 75 MG 24 hr capsule TAKE 3 CAPSULES (225 MG TOTAL) BY MOUTH DAILY. 270 capsule 0   ? zolpidem (AMBIEN) 5 MG tablet Take 1 tablet (5 mg total) by mouth at bedtime as needed for sleep. (Patient taking differently: Take 5 mg by mouth at bedtime. ) 30 tablet 0     No current facility-administered medications on file prior to visit.    :      ALLERGIES:  Patient has no known allergies.    Vitals: Blood pressure 131/90 respirations 18 temperature 98.2 heart rate 75 O2 sats 95% weight is 185 pounds      Physical exam:    Patient is alert he is oriented x3 he is pleasant conversant.  He is in good spirits with joking.  He appears comfortable without accessory muscle use or tachypnea.  He is mobile about his room in his wheelchair.  He points out he has adapted walker.  He also is able to walk with a cane in his AFO.  He says that is what he uses to get around his room when he is doing his workouts.  His left wrist remains in a splint, the cast is gone.  He demonstrates his pinch strength.  Skin is good visualized portions.  Appears nonedematous.        Due to the 2020 Covid 19 pandemic, except as noted above, the patient was visually observed at a 6 foot plus distance.  An observational exam was performed in an effort to keep patient safe from Covid 19 and other communicable diseases.   Labs:  Lab Results   Component Value Date    WBC 6.6 11/02/2020    HGB 12.8 (L) 11/02/2020    HCT 39.3 (L) 11/02/2020    MCV 94 11/02/2020     11/02/2020     Results for orders  placed or performed in visit on 01/30/17   Basic Metabolic Panel   Result Value Ref Range    Sodium 139 136 - 145 mmol/L    Potassium 4.5 3.5 - 5.0 mmol/L    Chloride 104 98 - 107 mmol/L    CO2 25 22 - 31 mmol/L    Anion Gap, Calculation 10 5 - 18 mmol/L    Glucose 93 70 - 125 mg/dL    Calcium 9.4 8.5 - 10.5 mg/dL    BUN 21 8 - 22 mg/dL    Creatinine 0.83 0.70 - 1.30 mg/dL    GFR MDRD Af Amer >60 >60 mL/min/1.73m2    GFR MDRD Non Af Amer >60 >60 mL/min/1.73m2         No results found for: TSH  No results found for: HGBA1C  [unfilled]  No results found for: [id number]  No results found for: BNP  [unfilled]  Most Recent EKG     Units 10/22/20  1357   VENTRATE BPM 77   ATRIALRATE BPM 77   QRSDURATION ms 82   QTINTERVAL ms 356   QTCCALC ms 402   P Imogene degrees 60   RAXIS degrees 46   TAXIS degrees 54   MUSEDX  Normal sinus rhythm  Cannot rule out Anterior infarct , age undetermined  Abnormal ECG  No previous ECGs available  Confirmed by KAYLAH SHELTON MD LOC:[facility] ([id number]) on 10/22/2020 2:06:17 PM           Assessment/Plan:      ICD-10-CM    1. Hemiplegia affecting right dominant side, unspecified etiology, unspecified hemiplegia type (H)  G81.91    2. Nicotine Dependence  F17.200    3. Arthritis of left wrist  M19.032        Left wrist arthritis  Status post 4 corner fusion with scaphoidectomy October 20, 2020 with Dr. Rhodes   Patient has continued to do well.  -Surgical follow-up has occurred,.  He has been changed to a splint.  Some lessening of his restrictions.  PT, OT, on hold  -Wean opiates as able  -Has bowel program in place if need be    History of subarachnoid hemorrhage with aneurysm rupture/coiling  Right hemiplegia  Aphasia   Patient unable to live independently until he has fewer restrictions and greater function in his left upper extremity.    Depression   Venlafaxine, bupropion continued without change.  He is on bupropion despite his history of the arachnoid hemorrhage and remote seizure.  Neurologist  is aware of his bupropion use.  No changes have been made.    Nicotine dependence   Nicotine patch in place    Insomnia   Zolpidem    Ritalin use   Longstanding per neurology following his CVA.    Seizure disorder   No seizure in many many years but remains on Keppra per neurology.  Bupropion continues per his outpatient medical team.      Case discussed with:    Facility staff         Shine Matt MD

## 2021-06-21 NOTE — LETTER
Letter by Marlo Valdez MD at      Author: Marlo Valdez MD Service: -- Author Type: --    Filed:  Encounter Date: 3/3/2021 Status: (Other)         Mr. Nino Stephen,    The number to call for information and scheduling for Covid immunizations through [facility] is:    [phone].    If you have further questions let us know            Sincerely,    Marlo Valdez MD    Electronically signed on 3/3/2021

## 2021-06-21 NOTE — LETTER
Letter by Marlo Valdez MD at      Author: Marlo Valdez MD Service: -- Author Type: --    Filed:  Encounter Date: 2/24/2021 Status: (Other)           Re: Nino Stephen    YOB: 1960          Rx: Half leather sole    Diagnosis: Hemiparesis secondary to ruptured aneurysm            Marlo Valdez MD    Electronically signed on 2/24/2021

## 2021-06-21 NOTE — LETTER
Letter by Edna Hua CNP at      Author: Edna Hua CNP Service: -- Author Type: --    Filed:  Encounter Date: 11/5/2020 Status: (Other)         [address] [address]                                  November 5, 2020    Patient: Nino Stephen   MR Number: [id number]   YOB: 1960   Date of Visit: 11/5/2020     Dear Dr. Cha Boyd [facility]:    Thank you for referring Nino Stephen to me for evaluation. Below are the relevant portions of my assessment and plan of care.    If you have questions, please do not hesitate to call me. I look forward to following Nino along with you.    Sincerely,        Edna Hua CNP          CC  No Recipients  Edna Hua CNP  11/5/2020  3:16 PM  Signed  Code Status:  FULL CODE  Visit Type: Follow-up (pain, rehab)     Facility:  [facility] [[id number]]        Facility Type: SNF (Skilled Nursing Facility, TCU)    History of Present Illness: Nino Stephen is a 60 y.o. male with a past medical history for OA, cerebral aneurysm, right hemiparesis, depression, HLD, tobacco dependence. He had same day surgery yesterday for left wrist OA.  He underwent a left wrist four corner fusion with E-Z clip staples and left scaphoid excision. He required TCU admission as he is unable to use his left hand due to surgery and he has a contracted right hand 2/2 stroke.      Today, he reports his pain is about the same. Edema of fingers are less and have good cms.  Therapy states he continues to have difficult with ambulation due to hemiparesis and left arm NWB status.  He has good strength so they are confident he will be independent in some way.     Review of Systems   Patient denies fever, chills, headache, lightheadedness, dizziness, rhinorrhea, cough, congestion, shortness of breath, chest pain, palpitations, abdominal pain, n/v, diarrhea, constipation, change in appetite, dysuria, frequency,  burning or pain with urination.  Other than stated in HPI all other review of systems is negative.         Physical Exam   In order to maintain social distancing during the COVID pandemic, a visual exam was completed.     Vitals:    11/05/20 0853   BP: 134/89   Pulse: 87   Resp: 18   Temp: 97.5  F (36.4  C)   SpO2: 96%        Patient is well nourished and no acute distress.  Head is AT/NC, EOM intact. Respiratory effort normal. No visible LE edema. Alert and oriented, face symmetric.  Good CMS to left hand with mobility and brachial pulse.  No visible skin issues or rashes. Mood euthymic      Labs:   Recent Results (from the past 240 hour(s))   HM2(CBC w/o Differential)   Result Value Ref Range    WBC 6.6 4.0 - 11.0 thou/uL    RBC 4.20 (L) 4.40 - 6.20 mill/uL    Hemoglobin 12.8 (L) 14.0 - 18.0 g/dL    Hematocrit 39.3 (L) 40.0 - 54.0 %    MCV 94 80 - 100 fL    MCH 30.5 27.0 - 34.0 pg    MCHC 32.6 32.0 - 36.0 g/dL    RDW 14.4 11.0 - 14.5 %    Platelets 263 140 - 440 thou/uL    MPV 11.0 8.5 - 12.5 fL         Assessment:  1. 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 Ankylosis of left wrist     3. Hemiplegia of dominant side as late effect following cerebrovascular disease (H)     4. Weakness         Plan:   Pain is controlled with APAP and oxycodone.  Continue therapies with the plan to discharge to home with a walker of some type.           Electronically signed by: Edna Hua CNP

## 2021-06-21 NOTE — LETTER
Letter by Hardik Reno LGSW at      Author: Hardik Reno LGSW Service: -- Author Type: --    Filed:  Encounter Date: 2/5/2021 Status: (Other)       CARE COORDINATION    February 5, 2021    Nino Stephen  [address] [place] [address] [address]  [facility] [address]      Dear Nino,  Your Care Team congratulates you on your journey to maintain wellness. This document will help guide you on your journey to maintain a healthy lifestyle.  You can use this to help you overcome any barriers you may encounter.  If you should have any questions or concerns, you can contact the members of your Care Team or contact your Primary Care Clinic for assistance.    Health Maintenance  Health Maintenance Reviewed:      My Access Plan  Medical Emergency 911   Primary Clinic Line Marlo Valdez MD - [phone]   24 Hour Appointment Line [phone] or  [phone]) (toll-free)   24 Hour Nurse Line [phone]   Preferred Urgent Care     Preferred Hospital     Preferred [place] [id number] [place] [address] [facility] [address] [facility]     Behavioral Health Crisis Line The National Suicide Prevention Lifeline at 1-452.459.2086 or 900     My Care Team Members  Patient Care Team       Relationship Specialty Notifications Start End    Marlo Valdez MD PCP - General   3/22/07     Phone: [phone] Fax: [phone] [address] [facility] [address]    Marlo Valdez MD Assigned PCP   7/28/19     Phone: [phone] Fax: [phone]         [address] [place] [id number]    Андрей    10/2/20     [facility] Care Coordinator    Phone: [phone]         Aida Vora    10/2/20     [facility]     Phone: [phone]         Arlene    10/2/20     Dr. Pate (Completing Hand Surgery) Care Coordinator    Phone: [phone]                   Goals     ? COMPLETED: Functional (pt-stated)      As of 11/3/2020, I am in TCU at [facility]          ? COMPLETED: Medical (pt-stated)      I have  completed the botox injection                 It has been your Clinic Care Team's pleasure to work with you on your goals.    Regards,  Your Clinic Care Team

## 2021-06-23 NOTE — TELEPHONE ENCOUNTER
Controlled Substance Refill Request  Medication Name:   Requested Prescriptions     Pending Prescriptions Disp Refills     zolpidem (AMBIEN) 5 MG tablet [Pharmacy Med Name: ZOLPIDEM TARTRATE 5 MG TABLET] 30 tablet 0     Sig: TAKE 1 TABLET BY MOUTH EVERY DAY AT BEDTIME AS NEEDED FOR SLEEP     Date Last Fill:   12/31/2018  Pharmacy:  [id number] - [place], [place] - [address]     Submit electronically to pharmacy  Controlled Substance Agreement Date Scanned:   Encounter-Level CSA Scan Date:    There are no encounter-level csa scan date.       Last office visit with prescriber/PCP: 2/15/2018 Marlo Valdez MD OR same dept: 2/15/2018 Marlo Valdez MD OR same specialty: 8/9/2018 Lisa Parsons MD  Last physical: Visit date not found Last MTM visit: Visit date not found

## 2021-06-24 NOTE — TELEPHONE ENCOUNTER
----- Message from Marlo Valdez MD sent at 2/19/2019  5:11 PM CST -----  Please contact this patient, let him know that the labs look good his Depakote level was good his hemoglobin and white count and platelets were normal    Also the liver tests and blood sugar and electrolytes were normal    The PSA level is now down into the normal range which is good.  He does not need to see the urologist.    In addition the cholesterol level also look good    No change in medications recheck in 6 months

## 2021-06-24 NOTE — TELEPHONE ENCOUNTER
Patient Returning Call  Reason for call:  Results  Information relayed to patient:    ----- Message from Marlo Valdez MD sent at 2/19/2019  5:11 PM CST -----  Please contact this patient, let him know that the labs look good his Depakote level was good his hemoglobin and white count and platelets were normal     Also the liver tests and blood sugar and electrolytes were normal     The PSA level is now down into the normal range which is good.  He does not need to see the urologist.     In addition the cholesterol level also look good     No change in medications recheck in 6 months  Patient has additional questions:  No. Patient verbalized understanding and had no additional questions at this time.  If YES, what are your questions/concerns:  N/A  Okay to leave a detailed message?: No call back needed

## 2021-06-24 NOTE — TELEPHONE ENCOUNTER
Called pt to inform him that medication was approved. Left message to call back with further questions.

## 2021-06-24 NOTE — TELEPHONE ENCOUNTER
Controlled Substance Refill Request  Medication Name:   Requested Prescriptions     Pending Prescriptions Disp Refills     zolpidem (AMBIEN) 5 MG tablet 30 tablet 0     Sig: Take 1 tablet (5 mg total) by mouth at bedtime as needed for sleep.     Date Last Fill: 2/6/2019  [facility]      Submit electronically to pharmacy  Controlled Substance Agreement Date Scanned:   Encounter-Level CSA Scan Date:    There are no encounter-level csa scan date.       Last office visit with prescriber/PCP: 2/18/2019 Marlo Valdez MD OR same dept: 2/18/2019 Marlo Valdez MD OR same specialty: 2/18/2019 Marlo Valdez MD  Last physical: Visit date not found Last MTM visit: Visit date not found

## 2021-06-24 NOTE — TELEPHONE ENCOUNTER
"Called and left message for pt to return call.# 3  \" Okay to relay message\"    Attempted to call 3 times okay to send letter?    "

## 2021-06-24 NOTE — PROGRESS NOTES
Subjective: Patient comes in for evaluation this 58-year-old male has not been in for about a year    His last physical was year and a half ago    He did have an elevated PSA in May 2017 and I referred him to [facility] urology.  In reviewing the findings he was a no-show.    Patient did have a CT scan this past summer in August 2018 at [facility] which showed some BPH on the CT he does take tamsulosin.  Also T12 compression fracture noted    He is not having any significant pain regarding that.    Patient needed labs today with Depakote CMP lipid PSA and CBC I did do a rectal exam he does have 1+ BPH.  We will call him back regarding results of the PSA and may need to go to urology as discussed.    Patient had a hyperplastic polyp in August 2012 is good for 10 years on colonoscopy (next is due August 2022 (.    He sees Dr. Butts from neurology.  He does have methylphenidate takes the Depakote he is at the aneurysm burst with right hemiparesis.  This is unchanged    He does have a lot of pain through his left wrist he said evaluations from [facility] orthopedist Dr. Brandt.  I did refer him back patient states he needs a brace but also wants to discuss further treatment he does take occasional oxycodone for this and I gave him a prescription for refill today.    Regarding depression PHQ 9 score was 1 he seems to be stable    Tobacco status: He  reports that he has quit smoking. His smoking use included cigarettes. he has never used smokeless tobacco.    Patient Active Problem List    Diagnosis Date Noted     Inguinal Hernia      Aneurysm Of The Basilar Tip      Major Depression, Single Episode      Chronic Cutaneous Ulcer Venous Stasis      Contusion Of The Right Chest Wall With Intact Skin Surface      Hyperlipidemia      Hemiparesis      Nicotine Dependence        Current Outpatient Medications   Medication Sig Dispense Refill     buPROPion (WELLBUTRIN XL) 150 MG 24 hr tablet Take 1 tablet (150 mg total) by  mouth every morning. 270 tablet 1     divalproex (DEPAKOTE) 500 MG EC tablet TAKE 2 TABLETS BY MOUTH TWICE A  tablet 3     methylphenidate (RITALIN) 10 MG tablet Take 5-10 mg by mouth 2 (two) times a day.        naproxen (NAPROSYN) 500 MG tablet TAKE 1 TABLET BY MOUTH TWICE A  tablet 0     oxyCODONE-acetaminophen (PERCOCET/ENDOCET) 5-325 mg per tablet Take 1 tablet by mouth every 6 (six) hours as needed for pain. Uses for wrist pain as needed 40 tablet 0     sildenafil, antihypertensive, (REVATIO) 20 mg tablet 2-3 po 1 hour prior to sex as needed 30 tablet 3     tadalafil (CIALIS) 5 MG tablet TAKE 1 TABLET BY MOUTH ONCE DAILY 30 tablet 2     tamsulosin (FLOMAX) 0.4 mg cap Take 1 capsule (0.4 mg total) by mouth daily. 30 capsule 5     venlafaxine (EFFEXOR-XR) 75 MG 24 hr capsule TAKE 3 CAPSULES BY MOUTH DAILY 270 capsule 3     zolpidem (AMBIEN) 5 MG tablet TAKE 1 TABLET BY MOUTH EVERY DAY AT BEDTIME AS NEEDED FOR SLEEP 30 tablet 0     No current facility-administered medications for this visit.        ROS:   10 point review of systems positive as outlined above otherwise negative    He did need a flu shot and TDA P and these were both given    Objective:    /70 (Patient Site: Left Arm, Patient Position: Sitting, Cuff Size: Adult Regular)   Pulse 88   Resp 12   Wt 188 lb (85.3 kg)   BMI 25.15 kg/m    Body mass index is 25.15 kg/m .  The following are part of a depression follow up plan for the patient:  under care of mental health team    General appearance no acute distress    Ongoing right hemiparesis as before unchanged    Neck negative, oropharynx is clear    Pupils react normally    Lungs were clear no rales or rhonchi heart was regular rate in the 80s    He has no significant pain through the lower thoracic upper lumbar area.  Please see above discussion regarding the T12 compression fracture which is old.  This was noted on a CT scan in August 2018.    Rectal exam 1+ BPH no  irregularities no nodules.  No peripheral edema    Left wrist with pain diffusely no effusion more pain along the radial aspect.    He has had no previous nonunion scaphoid fracture with collapse as well as radioscaphoid osteoarthritis      PSA level pending in addition Depakote CMP lipid PSA and CBC pending        Assessment:  1. Wrist pain, chronic, left  Ambulatory referral to Orthopedic Surgery    oxyCODONE-acetaminophen (PERCOCET/ENDOCET) 5-325 mg per tablet   2. Major depressive disorder with single episode, in full remission (H)  Comprehensive Metabolic Panel   3. Hemiplegia, unspecified etiology, unspecified hemiplegia type, unspecified laterality (H)  Valproic Acid (Depakene )    HM2(CBC w/o Differential)    Lipid Cascade FASTING   4. Aneurysm Of The Basilar Tip  Comprehensive Metabolic Panel   5. Benign prostatic hyperplasia with nocturia  PSA (Prostatic-Specific Antigen), Diagnostic   6. BPH (benign prostatic hyperplasia)  tamsulosin (FLOMAX) 0.4 mg cap   7. H/O compression fracture of spine      t12       Previous aneurysm burst with residual right hemiparesis, unchanged continues to see neurology    Depression controlled, continue venlafaxine    BPH on tamsulosin previous elevated PSA recheck may need to see urology    Compression fracture old at T12    Left wrist pain chronic with degenerative changes and a nonunited scaphoid fracture see orthopedist, refill on the Percocet        Plan: As outlined above he will be contacted regarding results  This transcription uses voice recognition software, which may contain typographical errors.

## 2021-06-25 NOTE — TELEPHONE ENCOUNTER
Reason for Call:  Other      Detailed comments: jarod calling from home care to let us know patient is discharged from nursing today and will be discharged from therapy tomorrow, he is handling setting up his own meds also    Phone Number Patient can be reached at: Other phone number:  [phone]    Best Time: any    Can we leave a detailed message on this number?: Yes    Call taken on 6/2/2021 at 3:54 PM by Rosa Torres

## 2021-06-29 NOTE — PROGRESS NOTES
"Progress Notes by Hardik Reno LGSW at 10/1/2020 11:00 AM     Author: Hardik Reno LGSW Service: -- Author Type:     Filed: 10/2/2020  9:09 AM Encounter Date: 10/1/2020 Status: Signed    : Hardik Reno LGSW ()       Clinic Care Coordination Contact  [facility] LING completed an assessment for enrollment into [facility] with Nino and his ex-wife, Imani.    Main concern is that Nino is having surgery on his left hand on 10/28/2020. Nino had a stroke about 15 years ago and has lost ability to use his right hand. Concerned about him caring for himself after the surgery.    They are interested in [facility]. They are wondering how to get him into TCU after surgery.    [facility] SW spoke with Dr. Rio Jacobs's care coordinator. She reported that Nino will not be able to bear weight on his hand/arm for at least 6 weeks. Can't get his hand wet for 10-14 days and will be in a splint/cast for about 6 weeks. She was not sure about arranging for aftercare, she reported this would typically go through PCP because this is an outpatient surgery.    [facility] SW spoke with patient's [facility] Care Coordinator, Андрей ([phone]). She reported that typically TCU would be initiated by a hospital . She could initiate Home Care, but they will not do an assessment until after the procedure.     [facility] SW left a voicemail for Aida Vora ([phone]), last noted [facility]  to inquire about increasing services during this time.     Nino and Imani are also wondering about a referral to get Botox in his right hand, reported they spoke about this at a recent visit with PCP.     SW will route concerns to PCP and [facility] RN for assistance.     Clinic Care Coordination Contact  OUTREACH    Referral Information:  Referral Source: PCP    Primary Diagnosis: Psychosocial    Chief Complaint   Patient presents with   ? Clinic Care Coordination - Initial        Universal Utilization: "   Clinic Utilization  Difficulty keeping appointments:: No  Compliance Concerns: No  No-Show Concerns: No  No PCP office visit in Past Year: No  Utilization    Last refreshed: 10/1/2020 11:47 AM: Hospital Admissions 0           Last refreshed: 10/1/2020 11:47 AM: ED Visits 0           Last refreshed: 10/1/2020 11:47 AM: No Show Count (past year) 0              Current as of: 10/1/2020 11:47 AM              Clinical Concerns:  Current Medical Concerns:  Has upcoming surgery on 10/28/2020, needing assistance lined up for after surgery.    Current Behavioral Concerns: None reported    Education Provided to patient: Role of CCC   Pain  Pain (GOAL):: No  Health Maintenance Reviewed: Not assessed  Clinical Pathway: None     Medication Management:  No concerns      Functional Status:  Bed or wheelchair confined:: No  Mobility Status: Independent w/Device  Fallen 2 or more times in the past year?: No  Any fall with injury in the past year?: No    Living Situation:  Current living arrangement:: I live alone  Type of residence:: Apartment - handicap accessible    Lifestyle & Psychosocial Needs:        Diet:: Regular  Inadequate nutrition (GOAL):: No  Tube Feeding: No  Inadequate activity/exercise (GOAL):: No  Significant changes in sleep pattern (GOAL): No  Transportation means:: [employer] mobility, Other, Friend([employer])     Jehovah's witness or spiritual beliefs that impact treatment:: No  Mental health DX:: No  Mental health management concern (GOAL):: No  Informal Support system:: Friends, Other(Ex-wife)   Socioeconomic History   ? Marital status: Patient Declined     Spouse name: Not on file   ? Number of children: Not on file   ? Years of education: Not on file   ? Highest education level: Not on file     Tobacco Use   ? Smoking status: Former Smoker     Types: Cigarettes   ? Smokeless tobacco: Never Used   Substance and Sexual Activity   ? Alcohol use: No   ? 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)  Supplies used at home:: None  Equipment Currently Used at Home: orthotic device, cane, straight, grab bar, toilet, grab bar, tub/shower    Advance Care Plan/Directive  Advanced Care Plans/Directives on file:: No  Advanced Care Plan/Directive Status: Not Applicable          Goals:   Goals        General    Functional (pt-stated)     Notes - Note created  10/2/2020  8:47 AM by Hardik Reno LGSW    Goal Statement: I want to be aware of resources available to me post op for home care and/or TCU as soon as possible  Date Goal set: 10/02/20  Barriers: Insurance Coverage, Medical concerns  Strengths: Suppport  Date to Achieve By: ASAP  Patient expressed understanding of goal: Yes  Action steps to achieve this goal:  1. CCC team will work to assist with the process of getting home care and/or TCU services post op on 10/28/2020          Medical (pt-stated)     Notes - Note created  10/2/2020  8:48 AM by Hardik Reno LGSW    Goal Statement: I want to pursue botox for my right hand prior to 10/28//2020  Date Goal set: 10/02/20  Barriers: Unsure of process  Strengths: Support  Date to Achieve By: Prior to 10/28/2020  Patient expressed understanding of goal: Yes  Action steps to achieve this goal:  1. I will work with my PCP team and specialist to get botox in my right hand  2. I will update CCC team                Patient/Caregiver understanding: Reported understanding        Future Appointments              In 2 weeks Marlo Valdez MD [facility]          Plan: SW will message PCP and CCC RN to assist with above concerns

## 2021-07-03 NOTE — ADDENDUM NOTE
Addendum Note by Marlo Soto MD at 7/11/2018  3:32 PM     Author: Marlo Soto MD Service: -- Author Type: Physician    Filed: 7/11/2018  3:32 PM Encounter Date: 7/11/2018 Status: Signed    : Marlo Soto MD (Physician)    Addended by: MARLO SOTO on: 7/11/2018 03:32 PM        Modules accepted: Orders

## 2021-07-03 NOTE — ADDENDUM NOTE
Addendum Note by Marina Sifuentes CMA at 1/19/2021  4:10 PM     Author: Marina Sifuentes CMA Service: -- Author Type: Certified Medical Assistant    Filed: 1/19/2021  4:10 PM Encounter Date: 1/19/2021 Status: Signed    : Marina Sifuentes CMA (Certified Medical Assistant)    Addended by: MARINA SIFUENTES on: 1/19/2021 04:10 PM        Modules accepted: Orders

## 2021-08-03 ENCOUNTER — TELEPHONE (OUTPATIENT)
Dept: FAMILY MEDICINE | Facility: CLINIC | Age: 61
End: 2021-08-03

## 2021-08-03 DIAGNOSIS — F32.4 MAJOR DEPRESSIVE DISORDER WITH SINGLE EPISODE, IN PARTIAL REMISSION (H): Primary | ICD-10-CM

## 2021-08-03 DIAGNOSIS — G47.00 INSOMNIA, UNSPECIFIED TYPE: ICD-10-CM

## 2021-08-03 RX ORDER — HYDROXYZINE HYDROCHLORIDE 25 MG/1
1-2 TABLET, FILM COATED ORAL
COMMUNITY
Start: 2021-06-13 | End: 2021-08-03

## 2021-08-03 NOTE — TELEPHONE ENCOUNTER
Called and left message#1 for patient to return call to clinic for message from Dr. Denny Espinosa to relay message

## 2021-08-03 NOTE — TELEPHONE ENCOUNTER
Please let Huma know that I am aware of this issue.    The patient came in with his ILS worker, Liset, in May.  We discussed the need for a different living situation where he would have some assistance (assisted care living situation).    They were pursuing different places at that time.    I agree he would benefit from this.    I would be happy to see him back with any of his caseworkers.  If they feel that would be helpful please schedule a follow-up visit with me

## 2021-08-03 NOTE — TELEPHONE ENCOUNTER
Called and informed Aida of this. She is saying that Nino is his own decision maker at this point he is refusing all services- home making, therapy and assisted living.  He is not taking his medications as prescribed either. She is unsure of what to do at this point- Patient has a scheduled appointment for 9/20/21 but is also refusing to go to and schedule appointments.

## 2021-08-03 NOTE — TELEPHONE ENCOUNTER
Please contact this patient and see if he will come in earlier for an office visit to discuss this.    Then let Huma know the outcome of that conversation.

## 2021-08-03 NOTE — TELEPHONE ENCOUNTER
Reason for Call:  Other   Detailed comments: Aida the tete anglin  has many concerns with Nino..She is asking if the PCP is aware of his living situation . The home is very cluttered .  He has declined home making service he isnt picking up his medications  as he should. She would like to discuss her concerns with .  Phone Number Patient can be reached at: Home number on file [phone] (home)    Best Time: anytime    Can we leave a detailed message on this number? YES    Call taken on 8/3/2021 at 9:18 AM by Janki Ivey

## 2021-08-04 NOTE — TELEPHONE ENCOUNTER
Patient returned call and was relayed message. Rescheduled appt from Sept to 8/16 to soonest available with PCP. Ok to wait that long for appt? Patient would also like a returned call to discuss more about TE.

## 2021-08-06 NOTE — TELEPHONE ENCOUNTER
"Routing refill request to provider for review/approval because:  Labs out of range:  RBC 4.09,  HGB 12.7 and hematocrit 38.9 on 4/3/21  Valproic Acid was 94.2 micrograms/mL on 5/10/21    Last Written Prescription:     Last office visit provider:  6/21/21    Requested Prescriptions   Pending Prescriptions Disp Refills     divalproex sodium delayed-release (DEPAKOTE) 500 MG DR tablet 360 tablet 1     Sig: Take 2 tablets (1,000 mg) by mouth 2 times daily       Anti-Seizure Meds Protocol  Failed - 8/6/2021 11:58 AM        Failed - Review Authorizing provider's last note.      Refer to last progress notes: confirm request is for original authorizing provider (cannot be through other providers).          Failed - Normal CBC on file in past 26 months     Recent Labs   Lab Test 11/02/20  0739   WBC 6.6   RBC 4.20*   HGB 12.8*   HCT 39.3*                    Failed - Depakote level within therapeutic range in past 26 months     No results found for: VALPROIC, JAI, GICHVAL  Depakote level must be checked 2-4 weeks after dosage change.          Passed - Recent (12 mo) or future (30 days) visit within the authorizing provider's specialty     Patient has had an office visit with the authorizing provider or a provider within the authorizing providers department within the previous 12 mos or has a future within next 30 days. See \"Patient Info\" tab in inbasket, or \"Choose Columns\" in Meds & Orders section of the refill encounter.              Passed - Normal ALT or AST on file in past 26 months     Recent Labs   Lab Test 10/22/20  1256   ALT 10     Recent Labs   Lab Test 10/22/20  1256   AST 15             Passed - Normal platelet count on file in past 26 months     Recent Labs   Lab Test 11/02/20  0739                  Passed - Medication is active on med list           hydrOXYzine (ATARAX) 25 MG tablet 60 tablet 3     Sig: Take 1-2 tablets (25-50 mg) by mouth nightly as needed for other (sleep)       Antihistamines " "Protocol Passed - 8/6/2021 11:58 AM        Passed - Recent (12 mo) or future (30 days) visit within the authorizing provider's specialty     Patient has had an office visit with the authorizing provider or a provider within the authorizing providers department within the previous 12 mos or has a future within next 30 days. See \"Patient Info\" tab in inbasket, or \"Choose Columns\" in Meds & Orders section of the refill encounter.              Passed - Patient is age 3 or older     Apply age and/or weight-based dosing for peds patients age 3 and older.    Forward request to provider for patients under the age of 3.          Passed - Medication is active on med list         Signed Prescriptions Disp Refills    hydrOXYzine (ATARAX) 25 MG tablet       Sig: Take 1-2 tablets by mouth nightly as needed for sleep       There is no refill protocol information for this order          Cherelle Lezama RN 08/06/21 6:32 PM  "

## 2021-08-09 RX ORDER — DIVALPROEX SODIUM 500 MG/1
1000 TABLET, DELAYED RELEASE ORAL 2 TIMES DAILY
Qty: 120 TABLET | Refills: 0 | Status: SHIPPED | OUTPATIENT
Start: 2021-08-09 | End: 2021-09-09

## 2021-08-09 RX ORDER — HYDROXYZINE HYDROCHLORIDE 25 MG/1
25-50 TABLET, FILM COATED ORAL
Qty: 60 TABLET | Refills: 0 | Status: SHIPPED | OUTPATIENT
Start: 2021-08-19 | End: 2021-09-21

## 2021-08-22 ENCOUNTER — TRANSFERRED RECORDS (OUTPATIENT)
Dept: HEALTH INFORMATION MANAGEMENT | Facility: CLINIC | Age: 61
End: 2021-08-22

## 2021-09-09 DIAGNOSIS — F32.4 MAJOR DEPRESSIVE DISORDER WITH SINGLE EPISODE, IN PARTIAL REMISSION (H): ICD-10-CM
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
## 2021-09-09 PROBLEM — R29.6 FREQUENT FALLS: Status: ACTIVE | Noted: 2021-03-24

## 2021-09-09 PROBLEM — S32.010A CLOSED COMPRESSION FRACTURE OF BODY OF L1 VERTEBRA (H): Status: ACTIVE | Noted: 2021-03-24

## 2021-09-09 PROBLEM — S22.41XA MULTIPLE FRACTURES OF RIBS, RIGHT SIDE, INITIAL ENCOUNTER FOR CLOSED FRACTURE: Status: ACTIVE | Noted: 2021-04-02
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
## 2021-09-09 PROBLEM — F17.200 NICOTINE DEPENDENCE: Status: ACTIVE | Noted: 2021-09-09

## 2021-09-09 RX ORDER — AMOXICILLIN 250 MG
1 CAPSULE ORAL
COMMUNITY

## 2021-09-09 RX ORDER — ACETAMINOPHEN 500 MG
1000 TABLET ORAL EVERY 6 HOURS
COMMUNITY
Start: 2021-04-05

## 2021-09-09 RX ORDER — DIVALPROEX SODIUM 500 MG/1
1000 TABLET, DELAYED RELEASE ORAL 2 TIMES DAILY
Qty: 120 TABLET | Refills: 1 | Status: SHIPPED | OUTPATIENT
Start: 2021-09-09

## 2021-09-09 RX ORDER — POLYETHYLENE GLYCOL 3350 17 G/17G
17 POWDER, FOR SOLUTION ORAL
COMMUNITY
Start: 2021-04-05

## 2021-09-09 RX ORDER — IBUPROFEN/PSEUDOEPHEDRINE HCL 200MG-30MG
3 TABLET ORAL
COMMUNITY
Start: 2021-03-26

## 2021-09-09 RX ORDER — METHOCARBAMOL 500 MG/1
500-1000 TABLET, FILM COATED ORAL
COMMUNITY
Start: 2021-04-05

## 2021-09-09 RX ORDER — TAMSULOSIN HYDROCHLORIDE 0.4 MG/1
0.4 CAPSULE ORAL
COMMUNITY
Start: 2021-05-10

## 2021-09-09 RX ORDER — NICOTINE 21 MG/24HR
PATCH, TRANSDERMAL 24 HOURS TRANSDERMAL
COMMUNITY

## 2021-09-09 NOTE — TELEPHONE ENCOUNTER
"Routing refill request to provider for review/approval because:  Labs out of range:  Multiple    Last Written Prescription Date: 8/9/2021  Last Fill Quantity:120,  # refills: 0   Last office visit provider: 6/21/2021    divalproex sodium delayed-release (DEPAKOTE) 500 MG DR tablet 120 tablet 0 8/9/2021  No   Sig - Route: Take 2 tablets (1,000 mg) by mouth 2 times daily - Oral   Sent to pharmacy as: Divalproex Sodium 500 MG Oral Tablet Delayed Release (Depakote)   Class: E-Prescribe   Order: [id number]   E-Prescribing Status: Receipt confirmed by pharmacy (8/9/2021  8:22 AM CDT)       Requested Prescriptions   Pending Prescriptions Disp Refills     divalproex sodium delayed-release (DEPAKOTE) 500 MG DR tablet 120 tablet 0     Sig: Take 2 tablets (1,000 mg) by mouth 2 times daily       Anti-Seizure Meds Protocol  Failed - 9/9/2021  9:30 AM        Failed - Review Authorizing provider's last note.      Refer to last progress notes: confirm request is for original authorizing provider (cannot be through other providers).          Failed - Normal CBC on file in past 26 months     Recent Labs   Lab Test 11/02/20  0739   WBC 6.6   RBC 4.20*   HGB 12.8*   HCT 39.3*                    Failed - Depakote level within therapeutic range in past 26 months     No results found for: VALPROIC, JAI, GICHVAL  Depakote level must be checked 2-4 weeks after dosage change.          Passed - Recent (12 mo) or future (30 days) visit within the authorizing provider's specialty     Patient has had an office visit with the authorizing provider or a provider within the authorizing providers department within the previous 12 mos or has a future within next 30 days. See \"Patient Info\" tab in inbasket, or \"Choose Columns\" in Meds & Orders section of the refill encounter.              Passed - Normal ALT or AST on file in past 26 months     Recent Labs   Lab Test 10/22/20  1256   ALT 10     Recent Labs   Lab Test 10/22/20  1256   AST 15      "        Passed - Normal platelet count on file in past 26 months     Recent Labs   Lab Test 11/02/20  0739                  Passed - Medication is active on med list         Signed Prescriptions Disp Refills    acetaminophen (TYLENOL) 500 MG tablet       Sig: Take 1,000 mg by mouth every 6 hours       There is no refill protocol information for this order       Melatonin 3 MG TBDP       Sig: Take 3 mg by mouth       There is no refill protocol information for this order       methocarbamol (ROBAXIN) 500 MG tablet       Sig: Take 500-1,000 mg by mouth       There is no refill protocol information for this order       nicotine (NICODERM CQ) 14 MG/24HR 24 hr patch       Sig: nicotine 14 mg/24 hr daily transdermal patch       There is no refill protocol information for this order       polyethylene glycol (MIRALAX) 17 GM/Dose powder       Sig: Take 17 g by mouth       There is no refill protocol information for this order       senna-docusate (SENOKOT-S/PERICOLACE) 8.6-50 MG tablet       Sig: Take 1 tablet by mouth       There is no refill protocol information for this order       tamsulosin (FLOMAX) 0.4 MG capsule       Sig: Take 0.4 mg by mouth       There is no refill protocol information for this order          Parker Cardoza MA 09/09/21 10:09 AM

## 2021-09-20 ENCOUNTER — MEDICAL CORRESPONDENCE (OUTPATIENT)
Dept: HEALTH INFORMATION MANAGEMENT | Facility: CLINIC | Age: 61
End: 2021-09-20

## 2021-09-20 DIAGNOSIS — G47.00 INSOMNIA, UNSPECIFIED TYPE: ICD-10-CM

## 2021-09-21 RX ORDER — HYDROXYZINE HYDROCHLORIDE 25 MG/1
25-50 TABLET, FILM COATED ORAL
Qty: 60 TABLET | Refills: 0 | Status: SHIPPED | OUTPATIENT
Start: 2021-09-21 | End: 2022-02-04

## 2021-09-21 NOTE — TELEPHONE ENCOUNTER
"Last Written Prescription Date:  8/19/21  Last Fill Quantity: 60,  # refills: 0   Last office visit provider:  6/21/21     Routing refill request to provider for review/approval because:  Rx was not specifically mentioned in last OV note. RN not able to renew.          Requested Prescriptions   Pending Prescriptions Disp Refills     hydrOXYzine (ATARAX) 25 MG tablet 60 tablet 0     Sig: Take 1-2 tablets (25-50 mg) by mouth nightly as needed for other (sleep)       Antihistamines Protocol Passed - 9/20/2021  1:40 PM        Passed - Recent (12 mo) or future (30 days) visit within the authorizing provider's specialty     Patient has had an office visit with the authorizing provider or a provider within the authorizing providers department within the previous 12 mos or has a future within next 30 days. See \"Patient Info\" tab in inbasket, or \"Choose Columns\" in Meds & Orders section of the refill encounter.              Passed - Patient is age 3 or older     Apply age and/or weight-based dosing for peds patients age 3 and older.    Forward request to provider for patients under the age of 3.          Passed - Medication is active on med list             Randi Bauer RN 09/21/21 6:31 AM  "

## 2022-02-04 DIAGNOSIS — Z76.0 ENCOUNTER FOR MEDICATION REFILL: Primary | ICD-10-CM

## 2022-02-04 DIAGNOSIS — G47.00 INSOMNIA, UNSPECIFIED TYPE: ICD-10-CM

## 2022-02-04 NOTE — TELEPHONE ENCOUNTER
Medication Question or Refill    Who is calling: [facility] in [place]    What medication are you calling about (include dose and sig)?: Hydroxyzine HCL 25 mg tabs take 1-2 tabs at HS to sleep    Who prescribed the medication?: PCP    Do you need a refill? Yes: script     When did you use the medication last? unknown    Patient offered an appointment? No    Do you have any questions or concerns?  No    Okay to leave a detailed message?: No     Call taken on 22 at 140 pm by Charlette Reveles CMA, CMT

## 2022-02-05 RX ORDER — HYDROXYZINE HYDROCHLORIDE 25 MG/1
25-50 TABLET, FILM COATED ORAL
Qty: 60 TABLET | Refills: 5 | Status: SHIPPED | OUTPATIENT
Start: 2022-02-05

## 2022-06-09 ENCOUNTER — TRANSFERRED RECORDS (OUTPATIENT)
Dept: HEALTH INFORMATION MANAGEMENT | Facility: CLINIC | Age: 62
End: 2022-06-09

## 2022-06-23 ENCOUNTER — TRANSFERRED RECORDS (OUTPATIENT)
Dept: HEALTH INFORMATION MANAGEMENT | Facility: CLINIC | Age: 62
End: 2022-06-23

## 2022-07-06 PROBLEM — S42.309A HUMERUS FRACTURE: Status: ACTIVE | Noted: 2022-05-01

## 2023-03-07 ENCOUNTER — TRANSFERRED RECORDS (OUTPATIENT)
Dept: HEALTH INFORMATION MANAGEMENT | Facility: CLINIC | Age: 63
End: 2023-03-07

## 2023-04-04 ENCOUNTER — TRANSFERRED RECORDS (OUTPATIENT)
Dept: HEALTH INFORMATION MANAGEMENT | Facility: CLINIC | Age: 63
End: 2023-04-04
Payer: MEDICARE

## 2024-01-26 ENCOUNTER — TRANSFERRED RECORDS (OUTPATIENT)
Dept: HEALTH INFORMATION MANAGEMENT | Facility: CLINIC | Age: 64
End: 2024-01-26
Payer: MEDICARE

## 2024-03-28 NOTE — PROGRESS NOTES
Qing Schneider was seen and treated in our emergency department on 3/28/2024.    No restrictions            Diagnosis:     Qing  may return to work on return date.    She may return on this date: 03/29/2024         If you have any questions or concerns, please don't hesitate to call.      Debbie Matta, DO    ______________________________           _______________          _______________  Hospital Representative                              Date                                Time Nino Stephen is a 60 y.o. male who is being evaluated via a billable video visit.      How would you like to obtain your AVS? Mail a copy.  If dropped from the video visit, the video invitation should be resent by: Text to cell phone: [phone]  Will anyone else be joining your video visit? No      Video Start Time: 3:12 pm    Assessment & Plan     Nino was seen today for follow-up.    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    Need for follow-up face-to-face April        Review of emergency room physician's notes from 2/16/2021  Review of external notes as documented in note  Review of the result(s) of each unique test - Review of CT and MRI from the emergency room on 2/16/2021, as well as lab tests  Diagnosis or treatment significantly limited by social determinants of health - Low socioeconomic status      26 minutes spent on the date of the encounter doing chart review, history and exam, documentation and further activities as noted above  :[id number]}     BMI:   Estimated body mass index is 25.25 kg/m  as calculated from the following:    Height as of 12/28/20: 6' (1.829 m).    Weight as of 12/28/20: 186 lb 3.2 oz (84.5 kg).       Return in about 6 weeks (around 4/14/2021) for Recheck.    Marlo Valdez MD  [facility]   Nino Stephen is 60 y.o. and presents today for the following health issues   HPI   This patient had a virtual  visit, video, due to the coronavirus pandemic.    Patient had a emergency room visit on 2/16/2021 for some possible left-sided weakness and fever.  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    There was no acute findings on the right side of the brain or anywhere else that would correlate with any left-sided weakness    He states that he feels like he is back to normal.  He does not have a fever    Denies any COVID-19 symptoms or exposure.    Regarding his left wrist he had surgery back in October and had a long recovery but he feels like that is doing well he does not have any pain    Depression has been controlled.  He continues on venlafaxine 75 mg 3 a day and bupropion 150 mg once daily.    He continues on Depakote for seizure prevention 500 mg 2 twice a day also takes methylphenidate from his neurologist, Dr. Butts he is on 10 mg twice a day.    For spasticity is on baclofen 3 times a day.    He is concerned regarding his meds set up using a talk to his home nurse regarding that.  He feels he is can be more independent doing that.    Otherwise he is stable to come in for a face-to-face visit April 20 with me and will do labs at that time including Depakote etc.  Review of Systems  10 point review of systems positive as outlined above otherwise negative      Objective       Vitals:  No vitals were obtained today due to virtual visit.    Physical Exam  General appearance: No acute distress    HEENT: No nasal congestion no sore throat    Denies any neck pain    Left wrist without swelling less pain essentially minimal pain now.    He has a right hemiplegia as before.    No abdominal pain.    Lungs: Nonlabored breathing no wheezing.    Heart: No palpitations or rapid heart rate            Video-Visit Details    Type of service:  Video  Visit    Video End Time (time video stopped): 3:30 PM  Originating Location (pt. Location): Home    Distant Location (provider location):  [facility]     Platform used for Video Visit: Vincent

## (undated) DEVICE — SOL ADH LIQUID BENZOIN SWAB 0.6ML C1544

## (undated) DEVICE — SU MONOCRYL 4-0 P-3 18" UND Y494G

## (undated) DEVICE — DRSG STERI STRIP 1/2X4" R1547

## (undated) DEVICE — SOL NACL 0.9% IRRIG 1000ML BOTTLE 2F7124

## (undated) DEVICE — SPONGE SURGIFOAM 12 1972

## (undated) DEVICE — DRSG KERLIX 4 1/2"X4YDS ROLL 6715

## (undated) DEVICE — SUCTION CANISTER MEDIVAC LINER 3000ML W/LID 65651-530

## (undated) DEVICE — LINEN TOWEL PACK X5 5464

## (undated) DEVICE — BUR ROUND CUT LINVATEC 2X45MM 5091-224

## (undated) DEVICE — BONE WAX 2.5GM W31G

## (undated) DEVICE — SU VICRYL 3-0 RB-1 27" UND J215H

## (undated) DEVICE — DRAPE MINI C-ARM 4003

## (undated) DEVICE — BNDG ELASTIC 4"X5YDS UNSTERILE 6611-40

## (undated) DEVICE — SOL WATER IRRIG 1000ML BOTTLE 2F7114

## (undated) DEVICE — CAST PLASTER SPLINT 4X15" 7394

## (undated) DEVICE — CAST PADDING 4" STERILE 9044S

## (undated) DEVICE — GLOVE PROTEXIS BLUE W/NEU-THERA 6.5  2D73EB65

## (undated) DEVICE — SU ETHILON 4-0 PC-3 18" 1864G

## (undated) DEVICE — PACK HAND WRIST SOP15HWFSP

## (undated) DEVICE — GLOVE PROTEXIS MICRO 6.5  2D73PM65

## (undated) RX ORDER — DEXAMETHASONE SODIUM PHOSPHATE 4 MG/ML
INJECTION, SOLUTION INTRA-ARTICULAR; INTRALESIONAL; INTRAMUSCULAR; INTRAVENOUS; SOFT TISSUE
Status: DISPENSED
Start: 2020-10-28

## (undated) RX ORDER — PROPOFOL 10 MG/ML
INJECTION, EMULSION INTRAVENOUS
Status: DISPENSED
Start: 2020-10-28

## (undated) RX ORDER — CEFAZOLIN SODIUM 2 G/100ML
INJECTION, SOLUTION INTRAVENOUS
Status: DISPENSED
Start: 2020-10-28

## (undated) RX ORDER — CEFAZOLIN SODIUM 1 G/3ML
INJECTION, POWDER, FOR SOLUTION INTRAMUSCULAR; INTRAVENOUS
Status: DISPENSED
Start: 2020-10-28

## (undated) RX ORDER — LIDOCAINE HYDROCHLORIDE 20 MG/ML
INJECTION, SOLUTION EPIDURAL; INFILTRATION; INTRACAUDAL; PERINEURAL
Status: DISPENSED
Start: 2020-10-28

## (undated) RX ORDER — ONDANSETRON 2 MG/ML
INJECTION INTRAMUSCULAR; INTRAVENOUS
Status: DISPENSED
Start: 2020-10-28